# Patient Record
Sex: FEMALE | Race: WHITE | NOT HISPANIC OR LATINO | Employment: FULL TIME | URBAN - METROPOLITAN AREA
[De-identification: names, ages, dates, MRNs, and addresses within clinical notes are randomized per-mention and may not be internally consistent; named-entity substitution may affect disease eponyms.]

---

## 2017-01-30 ENCOUNTER — OFFICE VISIT (OUTPATIENT)
Dept: ENDOCRINOLOGY | Facility: CLINIC | Age: 27
End: 2017-01-30
Payer: COMMERCIAL

## 2017-01-30 VITALS
HEIGHT: 67 IN | DIASTOLIC BLOOD PRESSURE: 70 MMHG | WEIGHT: 134.13 LBS | SYSTOLIC BLOOD PRESSURE: 110 MMHG | BODY MASS INDEX: 21.05 KG/M2

## 2017-01-30 DIAGNOSIS — E55.9 VITAMIN D DEFICIENCY: ICD-10-CM

## 2017-01-30 DIAGNOSIS — L65.9 HAIR LOSS: Primary | ICD-10-CM

## 2017-01-30 PROCEDURE — 1159F MED LIST DOCD IN RCRD: CPT | Mod: S$GLB,,, | Performed by: INTERNAL MEDICINE

## 2017-01-30 PROCEDURE — 99999 PR PBB SHADOW E&M-EST. PATIENT-LVL III: CPT | Mod: PBBFAC,,, | Performed by: INTERNAL MEDICINE

## 2017-01-30 PROCEDURE — 99203 OFFICE O/P NEW LOW 30 MIN: CPT | Mod: S$GLB,,, | Performed by: INTERNAL MEDICINE

## 2017-01-30 RX ORDER — DEXAMETHASONE 1 MG/1
TABLET ORAL
Qty: 1 TABLET | Refills: 0 | Status: SHIPPED | OUTPATIENT
Start: 2017-01-30 | End: 2017-07-06

## 2017-01-30 RX ORDER — ERGOCALCIFEROL 1.25 MG/1
50000 CAPSULE ORAL WEEKLY
Qty: 12 CAPSULE | Refills: 1 | Status: SHIPPED | OUTPATIENT
Start: 2017-01-30 | End: 2017-04-18

## 2017-01-30 RX ORDER — CHOLECALCIFEROL (VITAMIN D3) 25 MCG
185 TABLET ORAL DAILY
COMMUNITY
End: 2017-07-06

## 2017-01-30 NOTE — MR AVS SNAPSHOT
[facility] Endocrinology  [address]  [facility] [address]  Phone: [phone]  Fax: [phone]                  Eve Kuhn   2017 8:30 AM   Office Visit    Description:  Female : 1990   Provider:  Will Anthony MD   Department:  [facility] - Endocrinology           Reason for Visit     Hair Loss           Diagnoses this Visit        Comments    Androgenic alopecia    -  Primary            To Do List           Future Appointments        Provider Department Dept Phone    2017 8:30 AM LAB, [place] [facility] [phone]    2017 3:30 PM Will Anthony MD [facility] [phone]      Goals (5 Years of Data)     None       These Medications        Disp Refills Start End    dexamethasone (DECADRON) 1 MG Tab 1 tablet 0 2017     Take at 11 pm the night before labs are to be drawn    Pharmacy: [facility] [address] [place] [address] [facility] [address] Ph #: [phone]         [facility]     [facility] Care [facility] -  Assistance  Registered nurses in the [facility] provide clinical advisement, health education, appointment booking, and other advisory services.  Call for this free service at [phone].             Medications           Message regarding Medications     Verify the changes and/or additions to your medication regime listed below are the same as discussed with your clinician today.  If any of these changes or additions are incorrect, please notify your healthcare provider.        START taking these NEW medications        Refills    dexamethasone (DECADRON) 1 MG Tab 0    Sig: Take at 11 pm the night before labs are to be drawn    Class: Normal      STOP taking these medications     methylPREDNISolone (MEDROL DOSEPACK) 4 mg tablet Take as directed    fluticasone (FLONASE) 50 mcg/actuation nasal spray 2 sprays by Each Nare route once daily.  "   benzonatate (TESSALON) 100 MG capsule 1 - 2 po every 6 hours prn cough    topiramate (TOPAMAX) 15 MG capsule Take 1 cap q d for 7d, then 2 caps q d for 7d, then switch to 50 mg tab           Verify that the below list of medications is an accurate representation of the medications you are currently taking.  If none reported, the list may be blank. If incorrect, please contact your healthcare provider. Carry this list with you in case of emergency.           Current Medications     ascorbic acid (VITAMIN C) 100 MG tablet Take 100 mg by mouth once daily.    sumatriptan (IMITREX) 100 MG tablet Take 1 at onset of migraine.  May repeat 1 tab in 2 hours.  Limit to 3 tabs/week.    topiramate (TOPAMAX) 50 MG tablet Take 1 tablet (50 mg total) by mouth once daily.    vitamin D 1000 units Tab Take 185 mg by mouth once daily.    dexamethasone (DECADRON) 1 MG Tab Take at 11 pm the night before labs are to be drawn           Clinical Reference Information           Vital Signs - Last Recorded  Most recent update: 1/30/2017  8:36 AM by Denice Bullock LPN    BP Ht Wt LMP BMI    110/70 (BP Method: Manual) 5' 7" (1.702 m) 60.9 kg (134 lb 2.4 oz) 01/09/2017 21.01 kg/m2      Blood Pressure          Most Recent Value    BP  110/70      Allergies as of 1/30/2017     Vancomycin Analogues    Ciprofloxacin    Penicillins      Immunizations Administered on Date of Encounter - 1/30/2017     None      Orders Placed During Today's Visit     Future Labs/Procedures Expected by Expires    ACTH  1/30/2017 3/31/2018    Cortisol, 8AM  1/30/2017 3/31/2018    TSH  1/30/2017 3/31/2018      "

## 2017-01-30 NOTE — PROGRESS NOTES
Subjective:      Patient ID: Eve Kuhn is a 26 y.o. female.    Chief Complaint:  Hair Loss      History of Present Illness    Ms.Nicole Lanny Kuhn is seen as a new patient for hair loss     She was told she needs endocrine eval longtime ago     She had hair loss since middle school   She was told she had nutritional deficiency     Follows with a dermatologist  She had menarche at age  13 or 14   On/off OCPs, she had h./o ovarian cyst rupture     Not on OCPs since 2014   Regular menses     No facial hair growth     + diarrhea but she does have IBS     + some unintentional weight loss  also on topamax  No stretch marks   Ankle fracture and wrist fracture   No long bone fractures       Review of Systems   Constitutional: Positive for unexpected weight change.   Eyes: Negative for visual disturbance.   Respiratory: Negative for cough and shortness of breath.    Cardiovascular: Negative for palpitations.   Gastrointestinal: Positive for diarrhea.   Endocrine: Positive for cold intolerance.   Genitourinary: Negative for menstrual problem.   Neurological: Negative for tremors.   Hematological: Bruises/bleeds easily.   Psychiatric/Behavioral: Positive for sleep disturbance (wakes up a lot ). The patient is not nervous/anxious.        Objective:   Physical Exam   Constitutional: She appears well-developed. No distress.   HENT:   Right Ear: External ear normal.   Left Ear: External ear normal.   Nose: Nose normal.   Neck: No thyromegaly present.   Cardiovascular: Normal rate.    No murmur heard.  Pulmonary/Chest: Effort normal and breath sounds normal. No respiratory distress.   Abdominal: Soft. There is no tenderness. No hernia.   Musculoskeletal: She exhibits no edema.   Neurological: She displays normal reflexes. No cranial nerve deficit.   Skin: No rash noted. She is not diaphoretic.          Psychiatric: She has a normal mood and affect. Judgment normal.   Vitals reviewed.      Lab Review:   Results  for VICTOR MANUEL LOMBARDO (MRN [id number]) as of 1/30/2017 08:55   Ref. Range 6/27/2016 11:51   Vit D, 25-Hydroxy Latest Ref Range: 30 - 96 ng/mL 12 (L)       Assessment:     1. Androgenic alopecia  Cortisol, 8AM    ACTH    TSH   2. Vitamin D deficiency          #Hair loss   - lets rule out hyperthyroidism and cushing's, very low suspicion for cushing's   - TSH today     1-5% topamax can cause alopecia     3 vit D deficiency   - start ergo 50 K weekly for 12 weeks       Plan:     Follow up:  Blood work tomorrow at 8 AM   F/u in 6 months- blood work will decide later

## 2017-01-31 ENCOUNTER — LAB VISIT (OUTPATIENT)
Dept: LAB | Facility: HOSPITAL | Age: 27
End: 2017-01-31
Attending: INTERNAL MEDICINE
Payer: COMMERCIAL

## 2017-01-31 DIAGNOSIS — L65.9 HAIR LOSS: ICD-10-CM

## 2017-01-31 LAB
CORTIS SERPL-MCNC: <1 UG/DL
TSH SERPL DL<=0.005 MIU/L-ACNC: 0.5 UIU/ML

## 2017-01-31 PROCEDURE — 84443 ASSAY THYROID STIM HORMONE: CPT

## 2017-01-31 PROCEDURE — 82024 ASSAY OF ACTH: CPT

## 2017-01-31 PROCEDURE — 82533 TOTAL CORTISOL: CPT

## 2017-01-31 PROCEDURE — 36415 COLL VENOUS BLD VENIPUNCTURE: CPT | Mod: PO

## 2017-02-01 ENCOUNTER — PATIENT MESSAGE (OUTPATIENT)
Dept: ENDOCRINOLOGY | Facility: CLINIC | Age: 27
End: 2017-02-01

## 2017-02-03 LAB — ACTH PLAS-MCNC: 14 PG/ML

## 2017-06-29 ENCOUNTER — PATIENT MESSAGE (OUTPATIENT)
Dept: NEUROLOGY | Facility: CLINIC | Age: 27
End: 2017-06-29

## 2017-06-29 RX ORDER — SUMATRIPTAN SUCCINATE 100 MG/1
TABLET ORAL
Qty: 9 TABLET | Refills: 6 | Status: SHIPPED | OUTPATIENT
Start: 2017-06-29 | End: 2017-11-13

## 2017-07-06 ENCOUNTER — OFFICE VISIT (OUTPATIENT)
Dept: FAMILY MEDICINE | Facility: CLINIC | Age: 27
End: 2017-07-06
Payer: COMMERCIAL

## 2017-07-06 VITALS
OXYGEN SATURATION: 98 % | BODY MASS INDEX: 21.31 KG/M2 | HEIGHT: 67 IN | SYSTOLIC BLOOD PRESSURE: 100 MMHG | HEART RATE: 92 BPM | WEIGHT: 135.81 LBS | TEMPERATURE: 99 F | RESPIRATION RATE: 16 BRPM | DIASTOLIC BLOOD PRESSURE: 80 MMHG

## 2017-07-06 DIAGNOSIS — R05.8 POST-VIRAL COUGH SYNDROME: Primary | ICD-10-CM

## 2017-07-06 DIAGNOSIS — R05.9 COUGH: ICD-10-CM

## 2017-07-06 DIAGNOSIS — R06.2 WHEEZE: ICD-10-CM

## 2017-07-06 DIAGNOSIS — Z91.09 ENVIRONMENTAL ALLERGIES: ICD-10-CM

## 2017-07-06 PROCEDURE — 99999 PR PBB SHADOW E&M-EST. PATIENT-LVL IV: CPT | Mod: PBBFAC,,, | Performed by: NURSE PRACTITIONER

## 2017-07-06 PROCEDURE — 99214 OFFICE O/P EST MOD 30 MIN: CPT | Mod: S$GLB,,, | Performed by: NURSE PRACTITIONER

## 2017-07-06 RX ORDER — MONTELUKAST SODIUM 10 MG/1
TABLET ORAL
Qty: 30 TABLET | Refills: 5 | Status: SHIPPED | OUTPATIENT
Start: 2017-07-06 | End: 2017-11-13

## 2017-07-06 RX ORDER — FLUTICASONE PROPIONATE 220 UG/1
1 AEROSOL, METERED RESPIRATORY (INHALATION) 2 TIMES DAILY
Qty: 12 G | Refills: 0 | Status: SHIPPED | OUTPATIENT
Start: 2017-07-06 | End: 2017-11-13

## 2017-07-06 RX ORDER — MONTELUKAST SODIUM 10 MG/1
TABLET ORAL
Refills: 1 | COMMUNITY
Start: 2017-04-24 | End: 2017-07-06 | Stop reason: SDUPTHER

## 2017-07-06 NOTE — PATIENT INSTRUCTIONS
Inhaler Use  The inhaler that you were prescribed contains a potent medicine. It should only be used as directed. The medicine in your inhaler must be breathed deeply into your lungs for it to work. It will not work at all if it only reaches your mouth and throat. Follow the instructions below for best results. And remember to follow your asthma action plan as given to you by your doctor.  1. Keep your inhaler at room temperature.  2. Hold the inhaler so that the part that goes into your mouth is at the bottom.  3. Shake the inhaler well and remove the cap.  4. Breathe out through your mouth to fully empty your lungs.  5. Place the inhaler in your mouth and close your lips tightly around it. (Or hold the inhaler 1 to 2 inches from your open mouth if told to do so by your healthcare provider.)  6. Squeeze the inhaler as you breathe in slowly through your mouth until your lungs are full of air, drawing the medicine deep into your lungs.  7. Hold your breath for 10 seconds, or as long as you can comfortably hold your breath. Then breathe out slowly.  8. If you have been advised to take 2 puffs, wait 5 minutes, then repeat steps 3-7 above. Waiting 5 minutes between puffs will alow the medicine to open up your lungs so the second puff can get deeper into the lungs. Replace the cap when done.  9. If you were prescribed both a steroid inhaler and a bronchodilator inhaler, use the bronchodilator first to open the air passages. Wait 5 minutes, then use the steroid inhaler.  10. Rinse your mouth with water and spit it out (especially after using a steroid inhaler). This is very important if you are using a steroid inhaler to prevent thrush, a mild yeast infection of the mouth and back of the throat.  11. A special chamber (spacer) may be prescribed that attaches to your inhaler. This increases the amount of medicine that goes to your lungs. It also improves how well each treatment works. Ask your doctor about this if you  did not receive one.    Keep it clean  Remove the metal canister and do not immerse it in water. Then clean the plastic mouthpiece, cap, and spacer if you have one, by rinsing them well in warm running water for 30 to 60 seconds. Shake off excess water and allow the mouthpiece to dry completely (overnight is recommended). This should be done once a week. If you need the inhaler before the mouthpiece is dry, shake off excess water, replace canister, and test spray 2 times (away from the face).  Warning  A steroid inhaler is used to prevent an asthma attack. Do not use this to treat an acute wheezing episode. Use only bronchodilator inhalers (quick relief) to treat an acute asthma attack.  If you find that your medicine is not working and you need to use it more often than prescribed, this could be a sign that your asthma is getting worse. Go to the emergency room or urgent care right away. An asthma attack is easiest to treat in the early stages before it becomes severe.  When to seek medical advice  Get prompt medical attention if any of the following occur:  · Increased wheezing or shortness of breath  · Need to use your inhalers more often than usual without relief  · Fever of 100.4°F (38°C) or higher, or as directed by your healthcare provider  · Coughing up lots of dark-colored or bloody sputum (mucus)  · Chest pain with each breath  · Blue lips or fingernails  · Peak flow reading less than 50% of your normal best  Date Last Reviewed: 12/2/2015  © 5244-5235 [employer]. [address] [place] [address], [place] [address]. All rights reserved. This information is not intended as a substitute for professional medical care. Always follow your healthcare professional's instructions.        Using an Inhaler  Your healthcare provider may prescribe medicine that you breathe in using a metered-dose inhaler (MDI). An inhaler sends a measured amount of medicine in a fine mist.  Step 1:  · Shake the inhaler and remove  "the cap.  · Take a deep breath and let it out.  Step 2:  · Close your lips around the end of the inhaler mouthpiece. Or if you were told to use the "open-mouth" method, hold the inhaler 1 to 2 inches from your mouth.  Step 3:  · Breathe in slowly and deeply as you press down on the inhaler to release the medicine.  · Inhale fully.  Step 4:  · Hold your breath for a count of 10, or as long as you can comfortably.  · Then breathe out slowly through your mouth.  · Repeat these steps for each puff of medicine prescribed.             Important  · If the inhaler is being used for the first time or has not been used for a while, prime it as directed by the product maker. You can find important information about the medicine in the package insert. This is the paper that comes with the medicine.  · If you use more than one inhaler, make sure you know which one to use first.  · Your healthcare provider or pharmacist can show you how to use your inhaler the right way. Even if you think you are using it the right way, it is still a good idea to check.   Date Last Reviewed: 10/1/2016  © 2817-8179 [employer]. [address] [place] [address], [place] [address]. All rights reserved. This information is not intended as a substitute for professional medical care. Always follow your healthcare professional's instructions.        "

## 2017-07-06 NOTE — PROGRESS NOTES
Subjective:       Patient ID: Eve Kuhn is a 27 y.o. female.    Chief Complaint: Bronchitis (bronchitis 2 weeks ago and was treated for it but still has symptoms. Coughing,S.O.B,Low grade fever)    Here today to follow up on bronchitis - treated 3 weeks ago at urgent care  -   Went and told URI - given steroid shot and steroid pack and ABX and inhaler and tessalon    Dry and tight cough now - 99.3 temp - put her on omnicef - completed treatment - completed it 2 days ago   Doing albuterol treatments that she had at home for her son. - doing albuterol as needed - not in few days       Shortness of Breath   This is a new problem. The current episode started 1 to 4 weeks ago. The problem occurs every few minutes. The problem has been unchanged. The average episode lasts 30 seconds. Associated symptoms include chest pain, a fever, PND, sputum production and wheezing. Pertinent negatives include no abdominal pain, claudication, coryza, ear pain, headaches, hemoptysis, leg pain, leg swelling, neck pain, orthopnea, rash, rhinorrhea, sore throat, swollen glands, syncope or vomiting. The symptoms are aggravated by occupational exposure, exercise and any activity. The patient has no known risk factors for DVT/PE. She has tried beta agonist inhalers, leukotriene antagonists and oral steroids for the symptoms. The treatment provided mild relief. Her past medical history is significant for allergies and bronchiolitis. There is no history of aspirin allergies, asthma, CAD, chronic lung disease, COPD, DVT, a heart failure, PE, pneumonia or a recent surgery.     Vitals:    07/06/17 1251   BP: 100/80   Pulse: 92   Resp: 16   Temp: 99.3 °F (37.4 °C)     Review of Systems   Constitutional: Positive for fever. Negative for diaphoresis and fatigue.   HENT: Negative.  Negative for ear pain, rhinorrhea and sore throat.    Eyes: Negative.    Respiratory: Positive for sputum production, shortness of breath and wheezing.  Negative for cough and hemoptysis.    Cardiovascular: Positive for chest pain and PND. Negative for orthopnea, claudication, leg swelling and syncope.   Gastrointestinal: Negative.  Negative for abdominal pain, diarrhea, nausea and vomiting.   Endocrine: Negative.    Genitourinary: Negative.  Negative for dysuria and hematuria.   Musculoskeletal: Negative.  Negative for neck pain.   Skin: Negative.  Negative for color change and rash.   Allergic/Immunologic: Negative.    Neurological: Negative.  Negative for speech difficulty, numbness and headaches.   Hematological: Negative.    Psychiatric/Behavioral: Negative.        Past Medical History:   Diagnosis Date    Abnormal Pap smear     Androgenic alopecia     Chronic rhinitis     Hair loss 1/30/2017    Headache     IBS (irritable bowel syndrome)      Objective:      Physical Exam   Constitutional: She is oriented to person, place, and time. She appears well-developed and well-nourished.   HENT:   Head: Normocephalic and atraumatic.   Right Ear: External ear normal.   Left Ear: External ear normal.   Nose: Nose normal.   Mouth/Throat: Oropharynx is clear and moist. No oropharyngeal exudate.   Eyes: Conjunctivae and EOM are normal. Pupils are equal, round, and reactive to light.   Neck: Normal range of motion. Neck supple.   Cardiovascular: Normal rate, regular rhythm, normal heart sounds and intact distal pulses.  Exam reveals no friction rub.    No murmur heard.  Pulmonary/Chest: Effort normal. No apnea. She has wheezes in the left upper field, the left middle field and the left lower field.   Abdominal: Soft. Bowel sounds are normal.   Musculoskeletal: Normal range of motion.   Neurological: She is alert and oriented to person, place, and time.   Skin: Skin is warm and dry.   Psychiatric: She has a normal mood and affect. Her behavior is normal.   Nursing note and vitals reviewed.      Assessment:       1. Post-viral cough syndrome    2. Cough    3. Wheeze     4. Environmental allergies        Plan:       Post-viral cough syndrome  -     fluticasone (FLOVENT HFA) 220 mcg/actuation inhaler; Inhale 1 puff into the lungs 2 (two) times daily. Controller  Dispense: 12 g; Refill: 0    Cough  -     fluticasone (FLOVENT HFA) 220 mcg/actuation inhaler; Inhale 1 puff into the lungs 2 (two) times daily. Controller  Dispense: 12 g; Refill: 0    Wheeze  -     fluticasone (FLOVENT HFA) 220 mcg/actuation inhaler; Inhale 1 puff into the lungs 2 (two) times daily. Controller  Dispense: 12 g; Refill: 0    Environmental allergies  -     montelukast (SINGULAIR) 10 mg tablet; TK 1 T PO QD  Dispense: 30 tablet; Refill: 5        instructions on inhalers  Albuterol nebs 3 x day at home   Use flovent for 2 weeks  Cough will gradually decrease  Call if not better

## 2017-09-07 ENCOUNTER — OFFICE VISIT (OUTPATIENT)
Dept: FAMILY MEDICINE | Facility: CLINIC | Age: 27
End: 2017-09-07
Payer: COMMERCIAL

## 2017-09-07 VITALS
RESPIRATION RATE: 16 BRPM | SYSTOLIC BLOOD PRESSURE: 90 MMHG | DIASTOLIC BLOOD PRESSURE: 60 MMHG | WEIGHT: 135.13 LBS | HEART RATE: 99 BPM | BODY MASS INDEX: 21.21 KG/M2 | TEMPERATURE: 99 F | HEIGHT: 67 IN

## 2017-09-07 DIAGNOSIS — M94.0 COSTOCHONDRITIS: ICD-10-CM

## 2017-09-07 DIAGNOSIS — Z86.19 FREQUENT INFECTIONS: ICD-10-CM

## 2017-09-07 DIAGNOSIS — B08.5 HERPANGINA: Primary | ICD-10-CM

## 2017-09-07 PROCEDURE — 99999 PR PBB SHADOW E&M-EST. PATIENT-LVL III: CPT | Mod: PBBFAC,,, | Performed by: NURSE PRACTITIONER

## 2017-09-07 PROCEDURE — 99214 OFFICE O/P EST MOD 30 MIN: CPT | Mod: S$GLB,,, | Performed by: NURSE PRACTITIONER

## 2017-09-07 PROCEDURE — 3008F BODY MASS INDEX DOCD: CPT | Mod: S$GLB,,, | Performed by: NURSE PRACTITIONER

## 2017-09-07 NOTE — PATIENT INSTRUCTIONS
Hand, Foot & Mouth Disease (Child)    Hand, foot, and mouth disease (HFMD) is an illness caused by a virus. It is usually seen in infant and children younger than 10 years of age, but can occur in adults. This virus causes small ulcers in the mouth (throat, lips, cheeks, gums, and tongue) and small blisters or red spots may appear on the palms (hands), diaper area, and soles of the feet. There is usually a low-grade fever and poor appetite. HFMD is not a serious illness and usually go away in 1 to 2 weeks. The painful sores in the mouth may prevent your child from taking oral fluids well and result in dehydration.  It takes 3 to 5 days for the illness to appear in an exposed child. Generally, the HFMD is the most contagious during the first week of the illness. Sometimes, people can be contagious for days or weeks after the symptoms have disappeared. Adults who get infected with the HFMD may not have symptoms and may still be contagious.  HFMD can be transmitted from person to person by:  · Touching your nose, mouth, eye after touching the stool of an infected person (has the virus)  · Touching your nose, mouth, eye after touching fluid from the blisters/sores of an infected person  · Respiratory secretions (sneezing, coughing, blowing your nose)  · Touching contaminated objects (toys, doorknobs)  · Oral secretions (kissing)  Home care  Mouth pain  Unless your doctor has prescribed another medicine for mouth pain:  · Acetaminophen or ibuprofen may be used for pain or discomfort. Please consult your child's doctor before giving your child acetaminophen or ibuprofen for dosing instructions and when to give the medicine (schedule).  Do not give ibuprofen to an infant 6 months of age or younger. Talk to your child's doctor before giving him or her over-the counter medicines.  · Liquid antacid can be used 4 times per day to coat the mouth sores for pain relief.  Follow these instructions or do as directed by your  child's doctor.  ¨ Children over age 4 can use 1 teaspoon (5 ml)  as a mouth rinse after meals.  ¨ For children under age 4, a parent can place 1/2 teaspoon (2.5 ml)  in the front of the mouth after meals.  Avoid regular mouth rinses because they may sting.  Feeding  Follow a soft diet with plenty of fluids to prevent dehydration. If your child doesn't want to eat solid foods, it's OK for a few days, as long as he or she drinks lots of fluid. Cool drinks and frozen treats (sherbet) are soothing and easier to take. Avoid citrus juices (orange juice, lemonade, etc.) and salty or spicy foods. These may cause more pain in the mouth sores.  Fever  You may use acetaminophen or ibuprofen for fever, as directed by your child's doctor. Talk to your child's doctor for dosing instructions and schedule. Do not give ibuprofen to an infant 6 months of age or younger. If your child has chronic liver or kidney disease or ever had a stomach ulcer or GI bleeding, talk with your doctor before using these medicines.  Aspirin should never be used in anyone under 18 years of age who is ill with a fever. It may cause severe disease (Reye Syndrome) or death.  Isolation  Children may return to day care or school once the fever is gone and they are eating and drinking well. Contact your healthcare provider and ask when your child (or you) is able to return to school (or work).  Follow up  Follow up with your doctor as directed by our staff.  When to seek medical care  Call your child's healthcare provider right away if any of these occur:  · Your child complains of neck or chest pain  · Your child is having trouble breathing and lethargic  · Your child is having trouble swallowing  · Mouth ulcers are present after 2 weeks  · Your child's condition is worse  · Your child appear to be dehydrated (dry mouth, no tears, haven' t urinated is 8 or more hours)  · Fever of 100.4°F (38°C) or higher, not better with fever medicine  · Your child has  repeated fevers above 104°F (40°C)  · Your child is younger than 2 years old and their fever continues for more than 24 hours  · Your child is 2 years old and older and their fever continues for more than 3 days  When to call 911  When to call 911 or seek medical care immediately :  · Unusual fussiness, drowsiness or confusion  · Dark purple rash  · Trouble breathing  · Seizure  Date Last Reviewed: 8/13/2015  © 7942-7063 [employer]. [address] [place] [address]. All rights reserved. This information is not intended as a substitute for professional medical care. Always follow your healthcare professional's instructions.

## 2017-09-07 NOTE — PROGRESS NOTES
Subjective:       Patient ID: Eve Khun is a 27 y.o. female.    Chief Complaint: Mouth Lesions (Sore in mouth noticed it this AM and had fever on Monday night.Son has them too and was Dx with herpangina)    Noticed sore in mouth this AM  Has been home with sick son for 3 weeks  No chills, body aches  Temp max 102  4 days ago , Went to  - neck glands swollen, red throat, 102 - gave ibuprofen & did strep (-)     Prescribed amox, started on tues, temp 99 during day, spikes to 101 at night    Son has Hand foot and mouth   Had bronchitis several months ago  Feels worse when lying down, chest tightness    Dx with costochondritis - states SOB & chest tightness is most likely r/t this  Had full cardiac workup - states everything came back fine    Would like to have labs done once better for immune function      Sore Throat    This is a new problem. The current episode started in the past 7 days. The problem has been gradually worsening. Neither side of throat is experiencing more pain than the other. The maximum temperature recorded prior to her arrival was 102 - 102.9 F. The fever has been present for 3 to 4 days. The pain is at a severity of 7/10. The pain is moderate. Associated symptoms include abdominal pain, congestion, headaches, a hoarse voice, shortness of breath and swollen glands. Pertinent negatives include no coughing, diarrhea, drooling, ear discharge, ear pain, plugged ear sensation, neck pain, stridor, trouble swallowing or vomiting. She has had no exposure to strep or mono. She has tried NSAIDs, acetaminophen and cool liquids for the symptoms. The treatment provided no relief.     Vitals:    09/07/17 1113   BP: 90/60   Pulse: 99   Resp: 16   Temp: 98.9 °F (37.2 °C)     Review of Systems   Constitutional: Positive for chills, fatigue and fever. Negative for diaphoresis.   HENT: Positive for congestion, hoarse voice, postnasal drip and sore throat. Negative for drooling, ear discharge, ear  pain and trouble swallowing.    Eyes: Negative.    Respiratory: Positive for chest tightness, shortness of breath and wheezing. Negative for cough and stridor.    Cardiovascular: Negative for chest pain and palpitations.        Was tachycardic on Tues - 128 HR   Gastrointestinal: Positive for abdominal pain. Negative for diarrhea, nausea and vomiting.   Endocrine: Negative.    Genitourinary: Negative.  Negative for dysuria and hematuria.   Musculoskeletal: Positive for arthralgias. Negative for neck pain.        C/o shooting pains in rafita hands/fingers   Skin: Negative for color change, pallor and rash.   Allergic/Immunologic: Negative.    Neurological: Positive for dizziness, weakness and headaches. Negative for speech difficulty, light-headedness and numbness.        Dizzy more so when eyes closed   Hematological: Negative.    Psychiatric/Behavioral: Negative.        Past Medical History:   Diagnosis Date    Abnormal Pap smear     Androgenic alopecia     Chronic rhinitis     Hair loss 1/30/2017    Headache(784.0)     IBS (irritable bowel syndrome)      Objective:      Physical Exam   Constitutional: She is oriented to person, place, and time. She appears well-developed and well-nourished.   HENT:   Head: Normocephalic and atraumatic.   Right Ear: External ear normal.   Left Ear: External ear normal.   Nose: Nose normal.   Mouth/Throat: Uvula is midline. Posterior oropharyngeal erythema present.   1 small ulcerated lesion noted to L inner lip mucosa, 1 small ulcerated lesion noted to L buccal mucosa   Eyes: Conjunctivae and EOM are normal. Pupils are equal, round, and reactive to light. Left eye exhibits no discharge.   Wearing glasses   Neck: Neck supple. No JVD present. No tracheal deviation present.   Cardiovascular: Normal rate, regular rhythm, normal heart sounds and intact distal pulses.  Exam reveals no gallop and no friction rub.    No murmur heard.  Pulses:       Radial pulses are 2+ on the right  side, and 2+ on the left side.        Dorsalis pedis pulses are 2+ on the right side, and 2+ on the left side.   Pulmonary/Chest: Effort normal and breath sounds normal. No stridor. No respiratory distress. She has no wheezes. She has no rhonchi. She has no rales. She exhibits no tenderness.   Abdominal: Soft. Bowel sounds are normal. She exhibits no distension. There is no tenderness. There is no rebound and no guarding.   Musculoskeletal: Normal range of motion. She exhibits no edema.   Neurological: She is alert and oriented to person, place, and time.   Skin: Skin is warm and dry. Capillary refill takes less than 2 seconds. Rash noted. No erythema. No pallor.   Psychiatric: She has a normal mood and affect. Her behavior is normal. Judgment and thought content normal.   Nursing note and vitals reviewed.      Assessment:       1. Herpangina    2. Frequent infections    3. Costochondritis        Plan:       Herpangina  -     diphenhydrAMINE-aluminum-magnesium hydroxide-simethicone-lidocaine HCl 2%; Swish and spit 15 mLs every 4 (four) hours as needed.  Dispense: 180 mL; Refill: 0   Printed instructions given on viral course, hygiene, cleaning measures, hydration    Frequent infections  -     Ambulatory referral to Immunology    Costochondritis   Had full cardiac workup upon diagnosis   Stable, continue to monitor, instructed on worrisome cardiac symptoms   FU for change in symptoms        FU if symptoms worsen

## 2017-09-20 ENCOUNTER — OFFICE VISIT (OUTPATIENT)
Dept: ALLERGY | Facility: CLINIC | Age: 27
End: 2017-09-20
Payer: COMMERCIAL

## 2017-09-20 ENCOUNTER — LAB VISIT (OUTPATIENT)
Dept: LAB | Facility: HOSPITAL | Age: 27
End: 2017-09-20
Attending: ALLERGY & IMMUNOLOGY
Payer: COMMERCIAL

## 2017-09-20 VITALS — BODY MASS INDEX: 21.14 KG/M2 | HEART RATE: 94 BPM | WEIGHT: 134.69 LBS | HEIGHT: 67 IN | OXYGEN SATURATION: 95 %

## 2017-09-20 DIAGNOSIS — J31.0 OTHER CHRONIC RHINITIS: ICD-10-CM

## 2017-09-20 DIAGNOSIS — J32.9 RECURRENT SINUS INFECTIONS: ICD-10-CM

## 2017-09-20 DIAGNOSIS — H10.423 SIMPLE CHRONIC CONJUNCTIVITIS OF BOTH EYES: ICD-10-CM

## 2017-09-20 DIAGNOSIS — J32.9 RECURRENT SINUS INFECTIONS: Primary | ICD-10-CM

## 2017-09-20 LAB
BASOPHILS # BLD AUTO: 0.02 K/UL
BASOPHILS NFR BLD: 0.3 %
DIFFERENTIAL METHOD: ABNORMAL
EOSINOPHIL # BLD AUTO: 0.1 K/UL
EOSINOPHIL NFR BLD: 1.1 %
ERYTHROCYTE [DISTWIDTH] IN BLOOD BY AUTOMATED COUNT: 13.2 %
HCT VFR BLD AUTO: 40.8 %
HGB BLD-MCNC: 13.4 G/DL
IGA SERPL-MCNC: 203 MG/DL
IGE SERPL-ACNC: <35 IU/ML
IGG SERPL-MCNC: 1246 MG/DL
IGM SERPL-MCNC: 159 MG/DL
LYMPHOCYTES # BLD AUTO: 2.9 K/UL
LYMPHOCYTES NFR BLD: 39.7 %
MCH RBC QN AUTO: 27.6 PG
MCHC RBC AUTO-ENTMCNC: 32.8 G/DL
MCV RBC AUTO: 84 FL
MONOCYTES # BLD AUTO: 0.5 K/UL
MONOCYTES NFR BLD: 6.6 %
NEUTROPHILS # BLD AUTO: 3.7 K/UL
NEUTROPHILS NFR BLD: 52.2 %
PLATELET # BLD AUTO: 373 K/UL
PMV BLD AUTO: 10.9 FL
RBC # BLD AUTO: 4.86 M/UL
WBC # BLD AUTO: 7.17 K/UL

## 2017-09-20 PROCEDURE — 99999 PR PBB SHADOW E&M-EST. PATIENT-LVL II: CPT | Mod: PBBFAC,,, | Performed by: ALLERGY & IMMUNOLOGY

## 2017-09-20 PROCEDURE — 36415 COLL VENOUS BLD VENIPUNCTURE: CPT | Mod: PO

## 2017-09-20 PROCEDURE — 82784 ASSAY IGA/IGD/IGG/IGM EACH: CPT

## 2017-09-20 PROCEDURE — 86003 ALLG SPEC IGE CRUDE XTRC EA: CPT

## 2017-09-20 PROCEDURE — 82784 ASSAY IGA/IGD/IGG/IGM EACH: CPT | Mod: 59

## 2017-09-20 PROCEDURE — 86355 B CELLS TOTAL COUNT: CPT

## 2017-09-20 PROCEDURE — 86684 HEMOPHILUS INFLUENZA ANTIBDY: CPT

## 2017-09-20 PROCEDURE — 86357 NK CELLS TOTAL COUNT: CPT

## 2017-09-20 PROCEDURE — 85025 COMPLETE CBC W/AUTO DIFF WBC: CPT

## 2017-09-20 PROCEDURE — 82785 ASSAY OF IGE: CPT

## 2017-09-20 PROCEDURE — 82787 IGG 1 2 3 OR 4 EACH: CPT | Mod: 59

## 2017-09-20 PROCEDURE — 86003 ALLG SPEC IGE CRUDE XTRC EA: CPT | Mod: 59

## 2017-09-20 PROCEDURE — 86359 T CELLS TOTAL COUNT: CPT

## 2017-09-20 PROCEDURE — 86360 T CELL ABSOLUTE COUNT/RATIO: CPT

## 2017-09-20 PROCEDURE — 99244 OFF/OP CNSLTJ NEW/EST MOD 40: CPT | Mod: S$GLB,,, | Performed by: ALLERGY & IMMUNOLOGY

## 2017-09-20 NOTE — PROGRESS NOTES
"Subjective:       Patient ID: Eve Kuhn is a 27 y.o. female.    Chief Complaint:  Other (frequent illnesses)      26 yo woman presents for consult from [facility] for recurrent infections. She states she has an almost 1 yo son with Downs syndrome, Timbo. He started  November 2016 and in January 2017 he had RSV and flu and bilat pneumonia and was in hospital. Since then he has recurrent infections and she continues to catch them. She had bad bronchitis, several sinus infections and last herpangina. He has had immune system checked and has little low IgM so she wonders about her immune system. She was not often sick prior to this.  She does also have chronic "allergies". She is always congested. She blows nose every day. No sneeze. When gets bad does have tight chest with it. No season worse. No time of day worse. No triggers. She tied Singulair and no help.   Finally she gets a rash about 2 days prior to menstrual cycle and resolves by end of cycle. Never had it at all in pregnancy. It is small flesh colored bumps on elbow and fingers around joints. Fingers will hurt bur elbows only hurt if bumps them. No itch.   She has no known food, insect or latex allergy. No asthma or eczema. No other medical issues.         Environmental History: see history section for home environment  Review of Systems   Constitutional: Negative for appetite change, chills, fatigue and fever.   HENT: Positive for congestion and rhinorrhea. Negative for ear discharge, ear pain, facial swelling, nosebleeds, postnasal drip, sinus pressure, sneezing, sore throat, trouble swallowing and voice change.    Eyes: Negative for discharge, redness, itching and visual disturbance.   Respiratory: Positive for chest tightness. Negative for cough, choking, shortness of breath and wheezing.    Cardiovascular: Negative for chest pain, palpitations and leg swelling.   Gastrointestinal: Negative for abdominal distention, abdominal " pain, constipation, diarrhea, nausea and vomiting.   Genitourinary: Negative for difficulty urinating.   Musculoskeletal: Negative for arthralgias, gait problem, joint swelling and myalgias.   Skin: Positive for rash. Negative for color change.   Neurological: Negative for dizziness, syncope, weakness, light-headedness and headaches.   Hematological: Negative for adenopathy. Does not bruise/bleed easily.   Psychiatric/Behavioral: Negative for agitation, behavioral problems, confusion and sleep disturbance. The patient is not nervous/anxious.         Objective:    Physical Exam   Constitutional: She is oriented to person, place, and time. She appears well-developed and well-nourished. No distress.   HENT:   Head: Normocephalic and atraumatic.   Right Ear: Hearing, tympanic membrane, external ear and ear canal normal.   Left Ear: Hearing, tympanic membrane, external ear and ear canal normal.   Nose: No mucosal edema, rhinorrhea, sinus tenderness or septal deviation. No epistaxis. Right sinus exhibits no maxillary sinus tenderness and no frontal sinus tenderness. Left sinus exhibits no maxillary sinus tenderness and no frontal sinus tenderness.   Mouth/Throat: Uvula is midline, oropharynx is clear and moist and mucous membranes are normal. No uvula swelling.   Eyes: Conjunctivae are normal. Right eye exhibits no discharge. Left eye exhibits no discharge.   Neck: Normal range of motion. No thyromegaly present.   Cardiovascular: Normal rate, regular rhythm and normal heart sounds.    No murmur heard.  Pulmonary/Chest: Effort normal and breath sounds normal. No respiratory distress. She has no wheezes.   Abdominal: Soft. She exhibits no distension. There is no tenderness.   Musculoskeletal: Normal range of motion. She exhibits no edema or tenderness.   Lymphadenopathy:     She has no cervical adenopathy.   Neurological: She is alert and oriented to person, place, and time.   Skin: Skin is warm and dry. No rash noted. No  erythema.   Psychiatric: She has a normal mood and affect. Her behavior is normal. Judgment and thought content normal.   Nursing note and vitals reviewed.      Laboratory:   none performed   Assessment:       1. Recurrent sinus infections    2. Other chronic rhinitis    3. Simple chronic conjunctivitis of both eyes         Plan:       1. Advised likely recurrent infections are more related to exposure from young child but will send labs to eval immune systema dn immunocaps  2. Discussed rash with pt and advised needs derm eval  3. Phone review  4. NP Sarah notified of completed consult via [employer]

## 2017-09-20 NOTE — LETTER
September 20, 2017      Mague Smith, NP  [address] [facility] [address] [place] [address]           [facility]  [address] [facility] [address] [place] [address]  Phone: [phone]          Patient: Eve Kuhn   MR Number: [id number]   YOB: 1990   Date of Visit: 9/20/2017       Dear Mague Smith:    Thank you for referring Eve Kuhn to me for evaluation. Attached you will find relevant portions of my assessment and plan of care.    If you have questions, please do not hesitate to call me. I look forward to following Eve Kuhn along with you.    Sincerely,    Shazia Rodríguez MD    Enclosure  CC:  No Recipients    If you would like to receive this communication electronically, please contact [email or web address] or [phone] to request more information on [employer] access.    For providers and/or their staff who would like to refer a patient to [facility], please contact us through our one-stop-shop provider referral line, [facility], at [phone].    If you feel you have received this communication in error or would no longer like to receive these types of communications, please e-mail [email or web address]

## 2017-09-21 LAB
CD3+CD4+ CELLS # BLD: 1287 CELLS/UL (ref 300–1400)
CD3+CD4+ CELLS NFR BLD: 41.9 % (ref 28–57)
LYMPHOCYTES NFR CSF MANUAL: 1.11 % (ref 0.9–3.6)
LYMPHOCYTES NFR CSF MANUAL: 11.2 % (ref 7–31)
LYMPHOCYTES NFR CSF MANUAL: 1161 CELLS/UL (ref 200–900)
LYMPHOCYTES NFR CSF MANUAL: 2476 CELLS/UL (ref 700–2100)
LYMPHOCYTES NFR CSF MANUAL: 294 CELLS/UL (ref 100–500)
LYMPHOCYTES NFR CSF MANUAL: 336 CELLS/UL (ref 90–600)
LYMPHOCYTES NFR CSF MANUAL: 37.8 % (ref 10–39)
LYMPHOCYTES NFR CSF MANUAL: 80.7 % (ref 55–83)
LYMPHOCYTES NFR CSF MANUAL: 9.8 % (ref 6–19)

## 2017-09-23 LAB
IGG1 SER-MCNC: 549 MG/DL
IGG2 SER-MCNC: 382 MG/DL
IGG3 SER-MCNC: 164 MG/DL
IGG4 SER-MCNC: 40 MG/DL

## 2017-09-26 LAB
A ALTERNATA IGE QN: <0.35 KU/L
A FUMIGATUS IGE QN: <0.35 KU/L
ALLERGEN MAPLE/SYCAMORE IGE: <0.35 KU/L
ALLERGEN PENICILLIUM IGE: <0.35 KU/L
ALLERGEN WALNUT TREE IGE: <0.35 KU/L
ALLERGEN WHITE PINE TREE IGE: <0.35 KU/L
ALLERGEN WILLOW IGE: <0.35 KU/L
BAHIA GRASS IGE QN: <0.35 KU/L
BALD CYPRESS IGE QN: <0.35 KU/L
BERMUDA GRASS IGE QN: <0.35 KU/L
C GLOBOSUM IGE QN: <0.35 KU/L
C HERBARUM IGE QN: <0.35 KU/L
C LUNATA IGE QN: <0.35 KU/L
CAT DANDER IGE QN: <0.35 KU/L
COMMON RAGWEED IGE QN: <0.35 KU/L
COTTONWOOD IGE QN: <0.35 KU/L
D FARINAE IGE QN: <0.35 KU/L
D PTERONYSS IGE QN: <0.35 KU/L
DEPRECATED A ALTERNATA IGE RAST QL: NORMAL
DEPRECATED A FUMIGATUS IGE RAST QL: NORMAL
DEPRECATED BAHIA GRASS IGE RAST QL: NORMAL
DEPRECATED BALD CYPRESS IGE RAST QL: NORMAL
DEPRECATED BERMUDA GRASS IGE RAST QL: NORMAL
DEPRECATED C GLOBOSUM IGE RAST QL: NORMAL
DEPRECATED C HERBARUM IGE RAST QL: NORMAL
DEPRECATED C LUNATA IGE RAST QL: NORMAL
DEPRECATED CAT DANDER IGE RAST QL: NORMAL
DEPRECATED COMMON RAGWEED IGE RAST QL: NORMAL
DEPRECATED COTTONWOOD IGE RAST QL: NORMAL
DEPRECATED D FARINAE IGE RAST QL: NORMAL
DEPRECATED D PTERONYSS IGE RAST QL: NORMAL
DEPRECATED DOG DANDER IGE RAST QL: NORMAL
DEPRECATED ENGL PLANTAIN IGE RAST QL: NORMAL
DEPRECATED JOHNSON GRASS IGE RAST QL: NORMAL
DEPRECATED MARSH ELDER IGE RAST QL: NORMAL
DEPRECATED MUGWORT IGE RAST QL: NORMAL
DEPRECATED PECAN/HICK TREE IGE RAST QL: NORMAL
DEPRECATED ROACH IGE RAST QL: NORMAL
DEPRECATED S ROSTRATA IGE RAST QL: NORMAL
DEPRECATED SALTWORT IGE RAST QL: NORMAL
DEPRECATED SILVER BIRCH IGE RAST QL: NORMAL
DEPRECATED TIMOTHY IGE RAST QL: NORMAL
DEPRECATED WHITE OAK IGE RAST QL: NORMAL
DOG DANDER IGE QN: <0.35 KU/L
ENGL PLANTAIN IGE QN: <0.35 KU/L
JOHNSON GRASS IGE QN: <0.35 KU/L
MAPLE/SYCAMORE CLASS: NORMAL
MARSH ELDER IGE QN: <0.35 KU/L
MUGWORT IGE QN: <0.35 KU/L
PECAN/HICK TREE IGE QN: <0.35 KU/L
PENICILLIUM CLASS: NORMAL
RAGWEED, WESTERN IGE: <0.35 KU/L
RAGWEED, WESTERN, CLASS: NORMAL
ROACH IGE QN: <0.35 KU/L
S ROSTRATA IGE QN: <0.35 KU/L
SALTWORT IGE QN: <0.35 KU/L
SILVER BIRCH IGE QN: <0.35 KU/L
TIMOTHY IGE QN: <0.35 KU/L
WALNUT TREE CLASS: NORMAL
WHITE OAK IGE QN: <0.35 KU/L
WHITE PINE CLASS: NORMAL
WILLOW CLASS: NORMAL

## 2017-09-27 LAB
C DIPHTHERIAE AB SER IA-ACNC: 0.91 IU/ML
C TETANI AB SER-ACNC: 3.54 IU/ML
DEPRECATED S PNEUM 1 IGG SER-MCNC: 1.1 MCG/ML
DEPRECATED S PNEUM12 IGG SER-MCNC: <0.3 MCG/ML
DEPRECATED S PNEUM14 IGG SER-MCNC: <0.3 MCG/ML
DEPRECATED S PNEUM19 IGG SER-MCNC: 9.2 MCG/ML
DEPRECATED S PNEUM23 IGG SER-MCNC: 0.6 MCG/ML
DEPRECATED S PNEUM3 IGG SER-MCNC: 2.1 MCG/ML
DEPRECATED S PNEUM4 IGG SER-MCNC: 1.2 MCG/ML
DEPRECATED S PNEUM5 IGG SER-MCNC: 0.8 MCG/ML
DEPRECATED S PNEUM8 IGG SER-MCNC: <0.3 MCG/ML
DEPRECATED S PNEUM9 IGG SER-MCNC: <0.3 MCG/ML
HAEM INFLU B IGG SER-MCNC: 0.84 MG/L
S PNEUM DA 18C IGG SER-MCNC: 1.3 MCG/ML
S PNEUM DA 6B IGG SER-MCNC: 0.7 MCG/ML
S PNEUM DA 7F IGG SER-MCNC: 0.8 MCG/ML
S PNEUM DA 9V IGG SER-MCNC: 1 MCG/ML

## 2017-09-28 ENCOUNTER — TELEPHONE (OUTPATIENT)
Dept: ALLERGY | Facility: CLINIC | Age: 27
End: 2017-09-28

## 2017-09-29 ENCOUNTER — PATIENT MESSAGE (OUTPATIENT)
Dept: INTERNAL MEDICINE | Facility: CLINIC | Age: 27
End: 2017-09-29

## 2017-11-13 ENCOUNTER — OFFICE VISIT (OUTPATIENT)
Dept: FAMILY MEDICINE | Facility: CLINIC | Age: 27
End: 2017-11-13
Payer: COMMERCIAL

## 2017-11-13 VITALS
RESPIRATION RATE: 16 BRPM | WEIGHT: 132.06 LBS | HEART RATE: 87 BPM | DIASTOLIC BLOOD PRESSURE: 78 MMHG | BODY MASS INDEX: 20.73 KG/M2 | HEIGHT: 67 IN | SYSTOLIC BLOOD PRESSURE: 110 MMHG | OXYGEN SATURATION: 99 % | TEMPERATURE: 99 F

## 2017-11-13 DIAGNOSIS — G43.909 MIGRAINE WITHOUT STATUS MIGRAINOSUS, NOT INTRACTABLE, UNSPECIFIED MIGRAINE TYPE: ICD-10-CM

## 2017-11-13 DIAGNOSIS — F41.9 ANXIETY: Primary | ICD-10-CM

## 2017-11-13 DIAGNOSIS — R00.0 TACHYCARDIA: ICD-10-CM

## 2017-11-13 DIAGNOSIS — E55.9 VITAMIN D DEFICIENCY: ICD-10-CM

## 2017-11-13 DIAGNOSIS — R42 DIZZINESS: ICD-10-CM

## 2017-11-13 DIAGNOSIS — R07.89 CHEST PRESSURE: ICD-10-CM

## 2017-11-13 PROCEDURE — 99214 OFFICE O/P EST MOD 30 MIN: CPT | Mod: S$GLB,,, | Performed by: NURSE PRACTITIONER

## 2017-11-13 PROCEDURE — 99999 PR PBB SHADOW E&M-EST. PATIENT-LVL III: CPT | Mod: PBBFAC,,, | Performed by: NURSE PRACTITIONER

## 2017-11-13 RX ORDER — ESCITALOPRAM OXALATE 10 MG/1
10 TABLET ORAL DAILY
Qty: 30 TABLET | Refills: 3 | Status: SHIPPED | OUTPATIENT
Start: 2017-11-13 | End: 2017-12-01 | Stop reason: DRUGHIGH

## 2017-11-13 NOTE — PROGRESS NOTES
Subjective:       Patient ID: Eve Kuhn is a 27 y.o. female.    Chief Complaint: Anxiety; Shaking (started Saturday ); Nausea; Tachycardia; and Chest Pain    Experienced anxiety like this about two months ago. Anxiety episodes becoming more frequent. Started Saturday with anxiety/ nervousness and dizziness, chest discomfort began this AM with racing heart. Discomfort is constant and reproducible on palpation. Radiates along collar bone and up left side of neck.  Is having some familial stress the past two year, but no acute changes in her life. Drinks 1 cup of coffee a day. Takes no medications besides shanice prn for her headaches      Anxiety   Presents for initial visit. Onset was in the past 7 days. The problem has been unchanged. Symptoms include chest pain, decreased concentration, dizziness, nervous/anxious behavior, palpitations and restlessness. Patient reports no confusion, depressed mood, excessive worry, hyperventilation, insomnia, nausea, obsessions or shortness of breath. Symptoms occur constantly. The most recent episode lasted 3 days. The severity of symptoms is interfering with daily activities. Nothing aggravates the symptoms. The quality of sleep is good.     There are no known risk factors. Her past medical history is significant for anxiety/panic attacks. Past treatments include nothing.     Vitals:    11/13/17 1044   BP: 110/78   Pulse: 87   Resp: 16   Temp: 98.5 °F (36.9 °C)     Review of Systems   Constitutional: Negative for activity change, diaphoresis, fatigue, fever and unexpected weight change.   HENT: Negative.  Negative for hearing loss, rhinorrhea and trouble swallowing.    Eyes: Negative.  Negative for discharge and visual disturbance.   Respiratory: Positive for chest tightness. Negative for cough, shortness of breath and wheezing.    Cardiovascular: Positive for chest pain and palpitations. Negative for leg swelling.   Gastrointestinal: Negative for abdominal pain,  blood in stool, constipation, diarrhea, nausea and vomiting.   Endocrine: Negative.  Negative for polydipsia and polyuria.   Genitourinary: Negative for difficulty urinating, dysuria, hematuria and menstrual problem.   Musculoskeletal: Positive for neck pain. Negative for arthralgias and joint swelling.   Skin: Negative for color change and rash.   Allergic/Immunologic: Negative.    Neurological: Positive for dizziness and headaches. Negative for speech difficulty, weakness and numbness.   Hematological: Negative.    Psychiatric/Behavioral: Positive for decreased concentration. Negative for confusion, dysphoric mood and sleep disturbance. The patient is nervous/anxious. The patient does not have insomnia.        Past Medical History:   Diagnosis Date    Abnormal Pap smear     Androgenic alopecia     Chronic rhinitis     Hair loss 1/30/2017    Headache(784.0)     IBS (irritable bowel syndrome)      Objective:      Physical Exam   Constitutional: She is oriented to person, place, and time. She appears well-developed and well-nourished.   HENT:   Head: Normocephalic and atraumatic.   Mouth/Throat: Oropharynx is clear and moist.   Eyes: Conjunctivae and EOM are normal. Pupils are equal, round, and reactive to light.   Neck: Neck supple.   Cardiovascular: Normal rate, regular rhythm, normal heart sounds and intact distal pulses.    Pulmonary/Chest: Effort normal and breath sounds normal. She exhibits tenderness.       Tenderness on palpation   Abdominal: Soft. Bowel sounds are normal.   Musculoskeletal: Normal range of motion.   Neurological: She is alert and oriented to person, place, and time.   Skin: Skin is warm and dry.   Psychiatric: Her behavior is normal. Her mood appears anxious.   Nursing note and vitals reviewed.      Assessment:       1. Anxiety    2. Migraine without status migrainosus, not intractable, unspecified migraine type    3. Chest pressure    4. Tachycardia    5. Dizziness    6. Vitamin D  deficiency        Plan:       Anxiety  -     escitalopram oxalate (LEXAPRO) 10 MG tablet; Take 1 tablet (10 mg total) by mouth once daily.  Dispense: 30 tablet; Refill: 3    Migraine without status migrainosus, not intractable, unspecified migraine type  Taking PRN meds     Chest pressure  Tachycardia  Dizziness  All related to anxiety   Talked to her and she is wanting to start meds for this   Discussed start 1/2 tablet and increase to 1 tablet   Discussed needs FU 1 month     Vitamin D deficiency  Stable on replacement       FU in 1 month

## 2017-12-01 ENCOUNTER — PATIENT MESSAGE (OUTPATIENT)
Dept: FAMILY MEDICINE | Facility: CLINIC | Age: 27
End: 2017-12-01

## 2017-12-01 ENCOUNTER — TELEPHONE (OUTPATIENT)
Dept: FAMILY MEDICINE | Facility: CLINIC | Age: 27
End: 2017-12-01

## 2017-12-01 RX ORDER — SERTRALINE HYDROCHLORIDE 25 MG/1
25 TABLET, FILM COATED ORAL DAILY
Qty: 30 TABLET | Refills: 11 | Status: SHIPPED | OUTPATIENT
Start: 2017-12-01 | End: 2018-03-23 | Stop reason: ALTCHOICE

## 2018-01-08 ENCOUNTER — PATIENT MESSAGE (OUTPATIENT)
Dept: FAMILY MEDICINE | Facility: CLINIC | Age: 28
End: 2018-01-08

## 2018-01-10 ENCOUNTER — OFFICE VISIT (OUTPATIENT)
Dept: FAMILY MEDICINE | Facility: CLINIC | Age: 28
End: 2018-01-10
Payer: COMMERCIAL

## 2018-01-10 VITALS
TEMPERATURE: 99 F | HEIGHT: 67 IN | DIASTOLIC BLOOD PRESSURE: 70 MMHG | BODY MASS INDEX: 20.97 KG/M2 | SYSTOLIC BLOOD PRESSURE: 98 MMHG | WEIGHT: 133.63 LBS | HEART RATE: 93 BPM | OXYGEN SATURATION: 98 %

## 2018-01-10 DIAGNOSIS — J02.9 PHARYNGITIS, UNSPECIFIED ETIOLOGY: ICD-10-CM

## 2018-01-10 DIAGNOSIS — K12.1 MOUTH ULCERS: Primary | ICD-10-CM

## 2018-01-10 DIAGNOSIS — R09.81 NASAL CONGESTION: ICD-10-CM

## 2018-01-10 LAB
CTP QC/QA: YES
S PYO RRNA THROAT QL PROBE: NEGATIVE

## 2018-01-10 PROCEDURE — 87880 STREP A ASSAY W/OPTIC: CPT | Mod: QW,S$GLB,, | Performed by: NURSE PRACTITIONER

## 2018-01-10 PROCEDURE — 99999 PR PBB SHADOW E&M-EST. PATIENT-LVL III: CPT | Mod: PBBFAC,,, | Performed by: NURSE PRACTITIONER

## 2018-01-10 PROCEDURE — 99213 OFFICE O/P EST LOW 20 MIN: CPT | Mod: S$GLB,,, | Performed by: NURSE PRACTITIONER

## 2018-01-10 NOTE — PROGRESS NOTES
Subjective:       Patient ID: Eve Kuhn is a 27 y.o. female.  First time seeing pt . Last seen by JOHN Smith on 11/13/2017.    Chief Complaint: Mouth Lesions (bottom inside lip, noticed Sunday)    Pt reports 2 year old son goes to day care and he is sick with URI.    She c/o mouth ulcers since Sunday. She also has been having an URI for about a month and has been taking benadryl at night.  Denies fever.       Mouth Lesions    The current episode started 3 to 5 days ago. The onset was sudden. The problem occurs occasionally. The problem has been gradually worsening. The symptoms are relieved by one or more OTC medications. Associated symptoms include diarrhea, congestion, mouth sores, rhinorrhea, sore throat, cough and URI. Pertinent negatives include no fever, no nausea and no vomiting. She has been experiencing a moderate sore throat. The sore throat is characterized by pain only. She has been eating and drinking normally. There were sick contacts at home.     Vitals:    01/10/18 0830   BP: 98/70   Pulse: 93   Temp: 98.8 °F (37.1 °C)     Review of Systems   Constitutional: Negative for chills, diaphoresis and fever.   HENT: Positive for congestion, mouth sores, rhinorrhea and sore throat.    Eyes: Negative for visual disturbance.   Respiratory: Positive for cough. Negative for chest tightness.    Cardiovascular: Negative for chest pain.   Gastrointestinal: Positive for diarrhea. Negative for nausea and vomiting.   Skin:        Ulcers x2 to lower lip.       Objective:      Physical Exam   Constitutional: She is oriented to person, place, and time. Vital signs are normal. She appears well-developed and well-nourished. She is cooperative.   HENT:   Head: Normocephalic and atraumatic.   Right Ear: Hearing, external ear and ear canal normal. A middle ear effusion is present.   Left Ear: Hearing, external ear and ear canal normal. A middle ear effusion is present.   Nose: Mucosal edema present.    Mouth/Throat: Uvula is midline and mucous membranes are normal. Oral lesions present. Posterior oropharyngeal erythema present.       Eyes: Conjunctivae, EOM and lids are normal.   Neck: Normal range of motion. Neck supple.   Cardiovascular: Normal rate, regular rhythm, S1 normal, S2 normal and normal heart sounds.    Pulmonary/Chest: Effort normal and breath sounds normal. No respiratory distress.   Lymphadenopathy:        Head (right side): No submental, no submandibular, no tonsillar, no preauricular and no posterior auricular adenopathy present.        Head (left side): No submental, no submandibular, no tonsillar, no preauricular and no posterior auricular adenopathy present.     She has no cervical adenopathy.   Neurological: She is alert and oriented to person, place, and time.   Skin: Skin is warm, dry and intact. Capillary refill takes less than 2 seconds.   Psychiatric: She has a normal mood and affect. Her speech is normal and behavior is normal. Judgment and thought content normal.   Nursing note and vitals reviewed.      Assessment & Plan:       Mouth ulcers  -     (Magic mouthwash) 1:1:1 Benadryl 12.5mg/5ml liq, aluminum & magnesium hydroxide-simehticone (Maalox), lidocaine viscous 2%; Swish and spit 15 mLs every 4 (four) hours as needed. for mouth sores  Dispense: 360 mL; Refill: 0    Nasal Congestion   May trail Mucinex OTC as directed for congestion.   Instructed to increase water intake and stay well hydrated.     Pharyngitis, unspecified etiology  -     POCT Rapid Strep A, negative in clinic.    Explained viral etiology of symptoms.  Pt verbalized understanding of plan of care.      Return if symptoms worsen or fail to improve.

## 2018-01-10 NOTE — PATIENT INSTRUCTIONS
Canker Sore    A canker sore (also called an aphthous ulcer) is a painful sore on the lining of the mouth. It is most painful during the first few days, and it lasts about 7 to 14 days before going away.  Causes  Canker sores are not cold sores or fever blisters. They are not contagious, so they are not spread by contact. The exact cause of canker sores is not clear, but there are a number of things that can trigger them in different people.  · Mild injury, such as biting the inside of the mouth, lip, or cheek, or dental procedures  · Stress  · Poor diet, or lack of certain nutrients, including B vitamins and iron  · Foods that can irritate the mouth, including tomatoes, citrus fruits, and some nuts (foods that are acidic or contain bitter substances called tannins)  · Irritating chemicals, such as those in some toothpastes and mouthwashes  · Certain chronic illnesses  Symptoms  Canker sores are found on the lining of the mouth. They can be inside the cheeks or lips, on the roof of the mouth, at the base of the gums, on the tongue, or in the back of the throat. Canker sores typically have these characteristics:  · Small, flat (not raised) sores  · Can be white or yellowish bumps that are red around the edges or have a red halo  · Usually small in size, roundish, and in groups  · Accompanied by pain or burning  Canker sores do not leave a scar. But they usually come back.  Home care  The goals of canker sore treatment are to decrease the pain, speed healing, and prevent recurrence. No single treatment works for everyone. Try a number of techniques to see what works best.  General care  · You may find that soft, easy-to-chew foods cause less pain. Use a straw to direct liquids away from the sore.  · Use a soft-bristle toothbrush, and brush your teeth gently.  · Avoid acidic, salty, or spicy foods.  · Avoid injuring the inside of your mouth, or scraping your existing canker sores, by avoiding crusty and crunchy foods  like french bread and chips.  Medicines  You can try over-the-counter medicines that cover the sores and numb them. This protects the sores while they heal and helps reduce pain.  Homemade rinses and solutions  You can use these solutions as mouth rinses. Spit them out after using them. You can also dab them on the sores. You can repeat these treatments as often as needed.  · Rinse your mouth with saltwater.  · Mix equal amounts of hydrogen peroxide and water. You can use it as a mouthwash or dab it on spots with a cotton swab. You can also add sodium bicarbonate to this to make a paste, and then dab it on spots.  Follow-up care  Follow up with your healthcare provider, or as advised.  · If a culture was done, you will be notified if the treatment needs to be changed. You can call as directed for the results.  Call 911  Contact emergency services if any of these occur:  · Trouble breathing  · Inability to swallow  · Extreme drowsiness or trouble awakening  · Fainting or loss of consciousness  · Rapid heart rate  · Seizure  · Stiff neck  When to seek medical advice  Call your healthcare provider right away if any of these occur:  · You have a fever of 100.4°F (38°C) or higher.  · You are pregnant.  · You just had surgery or another medical procedure, or you were just discharged from the hospital.  · You are unable to eat or swallow due to pain.  Date Last Reviewed: 7/30/2015  © 0801-8597 [employer]. [address] [place] [address], [place] [address]. All rights reserved. This information is not intended as a substitute for professional medical care. Always follow your healthcare professional's instructions.

## 2018-01-12 ENCOUNTER — OFFICE VISIT (OUTPATIENT)
Dept: FAMILY MEDICINE | Facility: CLINIC | Age: 28
End: 2018-01-12
Payer: COMMERCIAL

## 2018-01-12 VITALS
WEIGHT: 133.63 LBS | DIASTOLIC BLOOD PRESSURE: 60 MMHG | SYSTOLIC BLOOD PRESSURE: 116 MMHG | OXYGEN SATURATION: 99 % | HEIGHT: 67 IN | BODY MASS INDEX: 20.97 KG/M2 | HEART RATE: 83 BPM | TEMPERATURE: 98 F

## 2018-01-12 DIAGNOSIS — B34.9 VIRAL ILLNESS: ICD-10-CM

## 2018-01-12 DIAGNOSIS — R09.81 NASAL CONGESTION: ICD-10-CM

## 2018-01-12 DIAGNOSIS — K12.1 MOUTH ULCER: Primary | ICD-10-CM

## 2018-01-12 DIAGNOSIS — R05.9 COUGH: ICD-10-CM

## 2018-01-12 PROCEDURE — 99999 PR PBB SHADOW E&M-EST. PATIENT-LVL III: CPT | Mod: PBBFAC,,, | Performed by: NURSE PRACTITIONER

## 2018-01-12 PROCEDURE — 99213 OFFICE O/P EST LOW 20 MIN: CPT | Mod: S$GLB,,, | Performed by: NURSE PRACTITIONER

## 2018-01-12 RX ORDER — TRIAMCINOLONE ACETONIDE 1 MG/G
PASTE DENTAL 2 TIMES DAILY
Qty: 5 G | Refills: 1 | Status: SHIPPED | OUTPATIENT
Start: 2018-01-12 | End: 2018-05-08 | Stop reason: SDUPTHER

## 2018-01-12 NOTE — PROGRESS NOTES
Subjective:       Patient ID: Eve Kuhn is a 27 y.o. female.    Chief Complaint: Fever (started 2 days ago); Cough (causing tighness/pressure in chest); and Nasal Congestion    Fever    This is a new problem. The current episode started yesterday. The problem occurs intermittently. The problem has been waxing and waning. The maximum temperature noted was 100 to 100.9 F. The temperature was taken using an oral thermometer. Associated symptoms include congestion, coughing and a sore throat. Pertinent negatives include no abdominal pain, chest pain, diarrhea, ear pain, headaches, muscle aches, nausea, rash, sleepiness, urinary pain, vomiting or wheezing. She has tried nothing for the symptoms.   Cough   This is a new problem. The current episode started in the past 7 days. The problem has been gradually worsening. The problem occurs constantly. The cough is productive of purulent sputum. Associated symptoms include a fever and a sore throat. Pertinent negatives include no chest pain, chills, ear congestion, ear pain, headaches, heartburn, hemoptysis, myalgias, nasal congestion, postnasal drip, rash, rhinorrhea, shortness of breath, sweats, weight loss or wheezing. Nothing aggravates the symptoms. The treatment provided no relief.     Vitals:    01/12/18 0918   BP: 116/60   Pulse: 83   Temp: 98.3 °F (36.8 °C)     Review of Systems   Constitutional: Positive for activity change and fever. Negative for chills, diaphoresis, fatigue, unexpected weight change and weight loss.   HENT: Positive for congestion, mouth sores and sore throat. Negative for ear pain, hearing loss, postnasal drip, rhinorrhea and trouble swallowing.    Eyes: Negative.  Negative for discharge and visual disturbance.   Respiratory: Positive for cough. Negative for hemoptysis, chest tightness, shortness of breath and wheezing.    Cardiovascular: Negative.  Negative for chest pain and palpitations.   Gastrointestinal: Negative.  Negative  for abdominal pain, blood in stool, constipation, diarrhea, heartburn, nausea and vomiting.   Endocrine: Negative.  Negative for polydipsia and polyuria.   Genitourinary: Negative.  Negative for difficulty urinating, dysuria, hematuria and menstrual problem.   Musculoskeletal: Positive for neck pain. Negative for arthralgias, joint swelling and myalgias.   Skin: Negative.  Negative for color change and rash.   Allergic/Immunologic: Negative.    Neurological: Negative.  Negative for speech difficulty, weakness, numbness and headaches.   Hematological: Negative.    Psychiatric/Behavioral: Negative.  Negative for confusion and dysphoric mood.       Past Medical History:   Diagnosis Date    Abnormal Pap smear     Androgenic alopecia     Chronic rhinitis     Hair loss 1/30/2017    Headache(784.0)     IBS (irritable bowel syndrome)      Objective:      Physical Exam   Constitutional: She is oriented to person, place, and time. She appears well-developed and well-nourished.   HENT:   Head: Normocephalic and atraumatic.   Right Ear: Hearing, tympanic membrane and ear canal normal.   Left Ear: Hearing, tympanic membrane and ear canal normal.   Nose: No mucosal edema or rhinorrhea. Right sinus exhibits no maxillary sinus tenderness and no frontal sinus tenderness. Left sinus exhibits no maxillary sinus tenderness.   Mouth/Throat: Oropharynx is clear and moist. Oral lesions present.       Eyes: Conjunctivae and EOM are normal. Pupils are equal, round, and reactive to light.   Neck: Neck supple.   Cardiovascular: Normal rate, regular rhythm, normal heart sounds and intact distal pulses.  Exam reveals no friction rub.    No murmur heard.  Pulmonary/Chest: Effort normal and breath sounds normal. No respiratory distress. She has no wheezes. She has no rales.   Abdominal: Soft. Bowel sounds are normal.   Musculoskeletal: Normal range of motion.   Neurological: She is alert and oriented to person, place, and time.   Skin: Skin  is warm and dry.   Psychiatric: She has a normal mood and affect. Her behavior is normal. Judgment and thought content normal.   Nursing note and vitals reviewed.      Assessment:       1. Mouth ulcer    2. Nasal congestion    3. Cough    4. Viral illness        Plan:       Mouth ulcer  -     triamcinolone acetonide 0.1% (KENALOG) 0.1 % paste; Place onto teeth 2 (two) times daily.  Dispense: 5 g; Refill: 1    Nasal congestion  Cough  Viral illness    Discussed that since still with fever and cough - still viral illness   Discussed that take symptom control and call if not better

## 2018-02-19 ENCOUNTER — OFFICE VISIT (OUTPATIENT)
Dept: FAMILY MEDICINE | Facility: CLINIC | Age: 28
End: 2018-02-19
Payer: COMMERCIAL

## 2018-02-19 VITALS
WEIGHT: 137.38 LBS | BODY MASS INDEX: 21.56 KG/M2 | DIASTOLIC BLOOD PRESSURE: 80 MMHG | HEART RATE: 71 BPM | SYSTOLIC BLOOD PRESSURE: 130 MMHG | OXYGEN SATURATION: 97 % | HEIGHT: 67 IN | TEMPERATURE: 99 F

## 2018-02-19 DIAGNOSIS — Z23 NEED FOR PNEUMOCOCCAL VACCINE: ICD-10-CM

## 2018-02-19 DIAGNOSIS — E55.9 VITAMIN D DEFICIENCY: ICD-10-CM

## 2018-02-19 DIAGNOSIS — Z00.00 WELLNESS EXAMINATION: Primary | ICD-10-CM

## 2018-02-19 PROCEDURE — 99395 PREV VISIT EST AGE 18-39: CPT | Mod: 25,S$GLB,, | Performed by: NURSE PRACTITIONER

## 2018-02-19 PROCEDURE — 90471 IMMUNIZATION ADMIN: CPT | Mod: S$GLB,,, | Performed by: INTERNAL MEDICINE

## 2018-02-19 PROCEDURE — 90686 IIV4 VACC NO PRSV 0.5 ML IM: CPT | Mod: S$GLB,,, | Performed by: INTERNAL MEDICINE

## 2018-02-19 PROCEDURE — 90472 IMMUNIZATION ADMIN EACH ADD: CPT | Mod: S$GLB,,, | Performed by: INTERNAL MEDICINE

## 2018-02-19 PROCEDURE — 99999 PR PBB SHADOW E&M-EST. PATIENT-LVL III: CPT | Mod: PBBFAC,,, | Performed by: NURSE PRACTITIONER

## 2018-02-19 PROCEDURE — 90670 PCV13 VACCINE IM: CPT | Mod: S$GLB,,, | Performed by: INTERNAL MEDICINE

## 2018-02-19 NOTE — PROGRESS NOTES
Subjective:       Patient ID: Eve Kuhn is a 27 y.o. female.  Last seen by  JOHN Smith NP on 1/12/2018.    Chief Complaint: Annual Exam (needs booster ( pneumonia)) and Labs Only    HPI  Vitals:    02/19/18 0944   BP: 130/80   Pulse: 71   Temp: 98.6 °F (37 °C)     Review of Systems   Constitutional: Negative for chills, diaphoresis and fever.   HENT: Negative for congestion, ear discharge, ear pain, mouth sores, postnasal drip, rhinorrhea, sinus pain, sinus pressure, sneezing, sore throat and voice change.    Eyes: Negative for visual disturbance.   Respiratory: Negative for cough, chest tightness and shortness of breath.    Cardiovascular: Negative for chest pain.   Gastrointestinal: Negative for abdominal distention, constipation, diarrhea and vomiting.   Genitourinary: Negative for difficulty urinating, dysuria, flank pain, frequency and hematuria.       Objective:      Physical Exam   Constitutional: She is oriented to person, place, and time. Vital signs are normal. She appears well-developed and well-nourished. She is cooperative.   HENT:   Head: Normocephalic and atraumatic.   Right Ear: Hearing, tympanic membrane, external ear and ear canal normal.   Left Ear: Hearing, tympanic membrane, external ear and ear canal normal.   Nose: Nose normal.   Mouth/Throat: Uvula is midline, oropharynx is clear and moist and mucous membranes are normal.   Eyes: Conjunctivae, EOM and lids are normal. Pupils are equal, round, and reactive to light.   Neck: Normal range of motion. Neck supple.   Cardiovascular: Normal rate, regular rhythm, S1 normal, S2 normal, normal heart sounds and normal pulses.    Pulmonary/Chest: Effort normal and breath sounds normal. No respiratory distress.   Abdominal: Soft. Bowel sounds are normal. She exhibits no distension. There is no tenderness.   Musculoskeletal: Normal range of motion.   Lymphadenopathy:        Head (right side): No submental, no submandibular, no tonsillar,  no preauricular and no posterior auricular adenopathy present.        Head (left side): No submental, no submandibular, no tonsillar, no preauricular and no posterior auricular adenopathy present.   Neurological: She is alert and oriented to person, place, and time.   Skin: Skin is warm, dry and intact. Capillary refill takes less than 2 seconds.   Psychiatric: She has a normal mood and affect. Her speech is normal and behavior is normal. Judgment and thought content normal.   Nursing note and vitals reviewed.      Assessment & Plan:       Wellness examination  -     CBC auto differential; Future; Expected date: 02/19/2018  -     Comprehensive metabolic panel; Future; Expected date: 02/19/2018  -     TSH; Future; Expected date: 02/19/2018  -     Lipid panel; Future; Expected date: 02/19/2018    Vitamin D deficiency  -     Vitamin D; Future; Expected date: 02/19/2018    Need for pneumococcal vaccine  -     (In Office Administered) Pneumococcal Conjugate Vaccine (13 Valent) (IM)    Other orders  -     Influenza - Quadrivalent (3 years & older) (PF)    Pt reports her Allergy Md wants her to get the pneumococcal vaccine. Pt reports she has not had one before. No data in LINKS for pt.       Follow-up if symptoms worsen or fail to improve.

## 2018-02-19 NOTE — PATIENT INSTRUCTIONS
Vitamin D  Does this test have other names?  25-hydroxyvitamin D (25-high-DROX-ee-VIE-tuh-min D), 25(OH)D  What is this test?  Vitamin D is mainly found in fortified dairy foods, juice, breakfast cereal, and certain fish. This vitamin plays many roles in the body. But because it helps the body absorb calcium from foods and supplements, it's particularly important for bone health. Vitamin D has many additional roles in the body.  Vitamin D comes in several forms. When ultraviolet light, such as sunlight, hits your skin, it creates vitamin D3. D2 is used to fortify dairy foods. Both of these are further processed by your liver and kidneys into a form your body can use. Most tests for vitamin D check the level of a form circulating in the body called 25-hydroxyvitamin D, also called 25(OH)D.   Why do I need this test?  Vitamin D testing has become much more popular in recent years. Your healthcare provider may check your vitamin D levels to find out if you have any risks to bone health. These might be:  · Low calcium  · Soft bones caused by low vitamin D or problems using it (osteomalacia)  · Osteopenia  · Osteoporosis  · Rickets, in children  You may also need this test if you are at risk for low vitamin D levels. Risks include:  · Being an older adult  · Having difficulty absorbing fat from your diet  · Having chronic kidney disease  · Have dark skin pigmentation  · Being a  baby  Vitamin D has many effects in the body. You may need this test to help your provider diagnose or treat:  · Problems with the parathyroid gland  · Cancer  · Autoimmune diseases such as multiple sclerosis and Crohn's disease  · Psoriasis  · Asthma  · Weakness or falls    What other tests might I have along with this test?  A healthcare provider may also want to check your parathyroid hormone levels and your calcium levels.   What do my test results mean?  A result for a lab test may be affected by many things, including the method  the laboratory uses to do the test. If your test results are different from the normal value, you may not have a problem. To learn what the results mean for you, talk with your healthcare provider.  Children and adults need more than 30 nanograms per milliliter (ng/ml) of vitamin D. The optimal level of 25(OH)D is usually between 30 and 60 ng/mL. Recommended daily amounts range from 400 to 800 international units (IU) per day based on your age.  Levels lower than normal can mean you are:  · Not making enough vitamin D on your own  · Not getting enough vitamin D in your diet  · Not absorbing vitamin D from your food as you should  Lower levels may also mean that your body is not converting the vitamin as it should. This might be because of kidney or liver disease.  Above-normal levels may be a sign that you're taking too much in supplement form.   How is this test done?  The test requires a blood sample, which is drawn through a needle from a vein in your arm.  Does this test pose any risks?  Taking a blood sample with a needle carries risks that include bleeding, infection, bruising, and a sense of lightheadedness. When the needle pricks your arm, you may feel a slight stinging sensation or pain. Afterward, the site may be slightly sore.   What might affect my test results?  The amount of time you spend in the sunlight, your diet, and whether you take vitamin D in supplement form can affect your vitamin D levels. Ask your healthcare provider if any health conditions you have or medicines you take could affect your results.  How do I get ready for this test?  Tell your healthcare provider if you take vitamin D supplements. Also be sure your provider knows about all medicines, herbs, vitamins, and supplements you are taking. This includes medicines that don't need a prescription and any illicit drugs you may use.   © 6361-1365 [employer]. [address] [place] [address], [place] [address]. All rights reserved.  This information is not intended as a substitute for professional medical care. Always follow your healthcare professional's instructions.

## 2018-03-09 ENCOUNTER — LAB VISIT (OUTPATIENT)
Dept: LAB | Facility: HOSPITAL | Age: 28
End: 2018-03-09
Attending: NURSE PRACTITIONER
Payer: COMMERCIAL

## 2018-03-09 ENCOUNTER — TELEPHONE (OUTPATIENT)
Dept: FAMILY MEDICINE | Facility: CLINIC | Age: 28
End: 2018-03-09

## 2018-03-09 DIAGNOSIS — Z00.00 WELLNESS EXAMINATION: ICD-10-CM

## 2018-03-09 DIAGNOSIS — E55.9 VITAMIN D DEFICIENCY: ICD-10-CM

## 2018-03-09 DIAGNOSIS — E55.9 VITAMIN D INSUFFICIENCY: Primary | ICD-10-CM

## 2018-03-09 LAB
25(OH)D3+25(OH)D2 SERPL-MCNC: 13 NG/ML
ALBUMIN SERPL BCP-MCNC: 4.3 G/DL
ALP SERPL-CCNC: 56 U/L
ALT SERPL W/O P-5'-P-CCNC: 11 U/L
ANION GAP SERPL CALC-SCNC: 10 MMOL/L
AST SERPL-CCNC: 14 U/L
BASOPHILS # BLD AUTO: 0.04 K/UL
BASOPHILS NFR BLD: 0.6 %
BILIRUB SERPL-MCNC: 0.6 MG/DL
BUN SERPL-MCNC: 11 MG/DL
CALCIUM SERPL-MCNC: 9.6 MG/DL
CHLORIDE SERPL-SCNC: 106 MMOL/L
CHOLEST SERPL-MCNC: 183 MG/DL
CHOLEST/HDLC SERPL: 3.8 {RATIO}
CO2 SERPL-SCNC: 26 MMOL/L
CREAT SERPL-MCNC: 0.8 MG/DL
DIFFERENTIAL METHOD: ABNORMAL
EOSINOPHIL # BLD AUTO: 0.1 K/UL
EOSINOPHIL NFR BLD: 2 %
ERYTHROCYTE [DISTWIDTH] IN BLOOD BY AUTOMATED COUNT: 13.6 %
EST. GFR  (AFRICAN AMERICAN): >60 ML/MIN/1.73 M^2
EST. GFR  (NON AFRICAN AMERICAN): >60 ML/MIN/1.73 M^2
GLUCOSE SERPL-MCNC: 89 MG/DL
HCT VFR BLD AUTO: 43 %
HDLC SERPL-MCNC: 48 MG/DL
HDLC SERPL: 26.2 %
HGB BLD-MCNC: 13.7 G/DL
IMM GRANULOCYTES # BLD AUTO: 0.02 K/UL
IMM GRANULOCYTES NFR BLD AUTO: 0.3 %
LDLC SERPL CALC-MCNC: 116 MG/DL
LYMPHOCYTES # BLD AUTO: 2.4 K/UL
LYMPHOCYTES NFR BLD: 33.6 %
MCH RBC QN AUTO: 27.7 PG
MCHC RBC AUTO-ENTMCNC: 31.9 G/DL
MCV RBC AUTO: 87 FL
MONOCYTES # BLD AUTO: 0.4 K/UL
MONOCYTES NFR BLD: 5.7 %
NEUTROPHILS # BLD AUTO: 4.1 K/UL
NEUTROPHILS NFR BLD: 57.8 %
NONHDLC SERPL-MCNC: 135 MG/DL
NRBC BLD-RTO: 0 /100 WBC
PLATELET # BLD AUTO: 277 K/UL
PMV BLD AUTO: 11.1 FL
POTASSIUM SERPL-SCNC: 4.2 MMOL/L
PROT SERPL-MCNC: 7.7 G/DL
RBC # BLD AUTO: 4.94 M/UL
SODIUM SERPL-SCNC: 142 MMOL/L
TRIGL SERPL-MCNC: 95 MG/DL
TSH SERPL DL<=0.005 MIU/L-ACNC: 0.64 UIU/ML
WBC # BLD AUTO: 7.14 K/UL

## 2018-03-09 PROCEDURE — 82306 VITAMIN D 25 HYDROXY: CPT

## 2018-03-09 PROCEDURE — 80053 COMPREHEN METABOLIC PANEL: CPT

## 2018-03-09 PROCEDURE — 84443 ASSAY THYROID STIM HORMONE: CPT

## 2018-03-09 PROCEDURE — 36415 COLL VENOUS BLD VENIPUNCTURE: CPT | Mod: PO

## 2018-03-09 PROCEDURE — 85025 COMPLETE CBC W/AUTO DIFF WBC: CPT

## 2018-03-09 PROCEDURE — 80061 LIPID PANEL: CPT

## 2018-03-09 NOTE — TELEPHONE ENCOUNTER
Attempted to call pt on the telephone, left message that results and recommendations were sent to her pt portal and to call us with any questions or concerns.   Vit D level remains low at 13. This is considered Vit D insufficiency. It was 12 a year ago.  We would like to see it 30 or higher. All other labs within acceptable range.   Vit D, 25-Hydroxy 30 - 96 ng/mL 13   12CM      Comments: Vitamin D deficiency.........<10 ng/mL                               Vitamin D insufficiency......10-29 ng/mL       Vitamin D sufficiency........> or equal to 30 ng/mL   Vitamin D toxicity............>100 ng/mL      Recommend Vit D supplement OTC, initial supplementation with 800 to 1000 international units daily may be sufficient.   Repeat lab in 3 months, order placed.

## 2018-03-26 ENCOUNTER — OFFICE VISIT (OUTPATIENT)
Dept: NEUROLOGY | Facility: CLINIC | Age: 28
End: 2018-03-26
Payer: COMMERCIAL

## 2018-03-26 VITALS
SYSTOLIC BLOOD PRESSURE: 110 MMHG | HEIGHT: 67 IN | HEART RATE: 80 BPM | WEIGHT: 138.25 LBS | DIASTOLIC BLOOD PRESSURE: 68 MMHG | BODY MASS INDEX: 21.7 KG/M2

## 2018-03-26 DIAGNOSIS — G43.009 MIGRAINE WITHOUT AURA AND WITHOUT STATUS MIGRAINOSUS, NOT INTRACTABLE: Primary | ICD-10-CM

## 2018-03-26 PROCEDURE — 99999 PR PBB SHADOW E&M-EST. PATIENT-LVL III: CPT | Mod: PBBFAC,,, | Performed by: PSYCHIATRY & NEUROLOGY

## 2018-03-26 PROCEDURE — 99214 OFFICE O/P EST MOD 30 MIN: CPT | Mod: S$GLB,,, | Performed by: PSYCHIATRY & NEUROLOGY

## 2018-03-26 RX ORDER — TOPIRAMATE SPINKLE 15 MG/1
CAPSULE ORAL
Qty: 21 CAPSULE | Refills: 0 | Status: SHIPPED | OUTPATIENT
Start: 2018-03-26 | End: 2018-08-01 | Stop reason: ALTCHOICE

## 2018-03-26 RX ORDER — NARATRIPTAN 2.5 MG/1
TABLET ORAL
Qty: 9 TABLET | Refills: 6 | Status: SHIPPED | OUTPATIENT
Start: 2018-03-26 | End: 2018-04-20

## 2018-03-26 RX ORDER — PREDNISONE 10 MG/1
TABLET ORAL
Qty: 10 TABLET | Refills: 0 | Status: SHIPPED | OUTPATIENT
Start: 2018-03-26 | End: 2018-07-17

## 2018-03-26 RX ORDER — TOPIRAMATE 50 MG/1
50 TABLET, FILM COATED ORAL DAILY
Qty: 30 TABLET | Refills: 11 | Status: SHIPPED | OUTPATIENT
Start: 2018-03-26 | End: 2019-08-23

## 2018-03-26 NOTE — PROGRESS NOTES
This is a 28-year-old right-handed patient who has an established history of migraine headache.  The patient indicates that in the past she had benefit from taking topiramate but had discontinue that medication out of her wish to be treated with more natural ways for migraine headache.  Unfortunately, since stopping the topiramate, her headache syndrome has seem to return.    The patient indicates that she estimates that her headache frequency is increasing and that in the last month she's had 2 headaches both of which lasted at least 3 days although her current headache now has been present for 6 days.  The pain is a right hemicranial pain that starts in the temple but then seems to radiate to the right occipital region.  The headache is described as throbbing with variable intensity.  For example, at the present time, the headache intensity is rated 3-4/10 but that with movement the headache pain can accelerate quickly.  Headache is been associated with both nausea and vomiting as well as photophobia and phonophobia.    In the past, the patient would attempt to control the pain with ibuprofen 400 mg and repeat this in 2 hours.  However this has not been effective on this occasion.  Over the past weekend, she was seen in the emergency room where she received IV fluids for her dehydration but also received Compazine Benadryl Toradol combination of medications which seem to help briefly but the headache did return.      ROS:  GENERAL: No fever, chills, fatigability or weight loss.  SKIN: No rashes, itching or changes in color or texture of skin.  HEAD: No  recent head trauma.  EYES: Visual acuity fine. No photophobia, ocular pain or diplopia.  EARS: Denies ear pain, discharge or vertigo.  NOSE: No loss of smell, no epistaxis or postnasal drip.  MOUTH & THROAT: No hoarseness or change in voice. No excessive gum bleeding.  NODES: Denies swollen glands.  CHEST: Denies CLINE, cyanosis, wheezing, cough and sputum  production.  CARDIOVASCULAR: Denies chest pain, PND, orthopnea or reduced exercise tolerance.  ABDOMEN: Appetite fine. No weight loss. Denies diarrhea, abdominal pain, hematemesis or blood in stool.  URINARY: No flank pain, dysuria or hematuria.  MUSCULOSKELETAL: No joint stiffness or swelling. Denies back pain.  NEUROLOGIC: No history of seizures, paralysis, alteration of gait or coordination.    The patient's past history, family history and social history are reviewed with the patient and her medical records.  No changes are made.    PE:   VITAL SIGNS: Blood pressure 110/68, pulse 80, weight 62.7 kg, height 5 foot 7 inches, BMI 21.65  APPEARANCE: Well nourished, well developed, in no acute distress.    HEAD: Normocephalic, atraumatic.  EYES: PERRL. EOMI.  Non-icteric sclerae.    EARS: TM's intact. Light reflex normal. No retraction or perforation.    NOSE: Mucosa pink. Airway clear.  MOUTH & THROAT: No tonsillar enlargement. No pharyngeal erythema or exudate. No stridor.  NECK: Supple. No bruits.  CHEST: Lungs clear to auscultation.  CARDIOVASCULAR: Regular rhythm without significant murmurs.  MUSCULOSKELETAL:  No bony deformity seen.   NEUROLOGIC:   Mental Status:  The patient is well oriented to person, time, place, and situation.  The patient is attentive to the environment and cooperative for the exam.  Cranial Nerves: II-XII grossly intact. Fundoscopic exam is normal.  No hemorrhage, exudate or papilledema is present. The extraocular muscles are intact in the cardinal directions of gaze.  No ptosis is present. Facial features are symmetrical.  Speech is normal in fluency, diction, and phrasing.  Tongue protrudes in the midline.    Gait and Station:  Romberg is negative.  Good alternate armswing with normal gait.  Motor:  No downdrift of either arm when held at shoulder level.  Manual muscle testing of proximal and distal muscles of both upper and lower extremities is normal. Muscle mass is normal.  Muscle  tone is normal.  Sensory:  Intact both upper and lower extremities to pin prick, touch, and vibration.  Cerebellar:  Finger to nose done well.  Alternating movements intact.  No involuntary movements or tremor seen.  Reflexes:  Stretch reflexes are 2+ both upper and lower extremities.  Plantar stimulation is flexor bilaterally and no pathological reflexes are seen     ASSESSMENT:  1.  Migraine headache without aura, with status migrainosus, not intractable    RECOMMENDATIONS:  1.  To treat current headache, I would recommend redness on 30 mg a day with naratriptan 2.5 mg a day for 3 days.  2.  The patient had a good response to Topamax in the past and did not experience any of the undesirable side effects from that medication.  Therefore, I recommend that she restart Topamax and advance to 50 mg a day over the next 3 weeks.  3.  Keep accurate headache diary and report frequency through the patient portal  4.  Routine follow-up with neurology in 6 months.    This was a 35 minute visit with the patient with over 50% of time discussing the treatment of her current headache as well as prevention of future migraine.     This note is generated with speech recognition software and is subject to transcription error and sound alike phrases that may be missed by proofreading.

## 2018-03-27 ENCOUNTER — PATIENT MESSAGE (OUTPATIENT)
Dept: NEUROLOGY | Facility: CLINIC | Age: 28
End: 2018-03-27

## 2018-03-28 ENCOUNTER — PATIENT MESSAGE (OUTPATIENT)
Dept: NEUROLOGY | Facility: CLINIC | Age: 28
End: 2018-03-28

## 2018-04-20 ENCOUNTER — PATIENT MESSAGE (OUTPATIENT)
Dept: NEUROLOGY | Facility: CLINIC | Age: 28
End: 2018-04-20

## 2018-04-20 DIAGNOSIS — G43.009 MIGRAINE WITHOUT AURA AND WITHOUT STATUS MIGRAINOSUS, NOT INTRACTABLE: Primary | ICD-10-CM

## 2018-04-20 RX ORDER — RIZATRIPTAN BENZOATE 10 MG/1
10 TABLET ORAL
Qty: 9 TABLET | Refills: 3 | Status: SHIPPED | OUTPATIENT
Start: 2018-04-20 | End: 2018-05-20

## 2018-05-07 ENCOUNTER — PATIENT MESSAGE (OUTPATIENT)
Dept: FAMILY MEDICINE | Facility: CLINIC | Age: 28
End: 2018-05-07

## 2018-05-07 DIAGNOSIS — K12.1 MOUTH ULCER: ICD-10-CM

## 2018-05-08 ENCOUNTER — PATIENT OUTREACH (OUTPATIENT)
Dept: ADMINISTRATIVE | Facility: HOSPITAL | Age: 28
End: 2018-05-08

## 2018-05-08 RX ORDER — TRIAMCINOLONE ACETONIDE 1 MG/G
PASTE DENTAL 2 TIMES DAILY
Qty: 5 G | Refills: 1 | Status: SHIPPED | OUTPATIENT
Start: 2018-05-08 | End: 2018-06-07

## 2018-05-08 NOTE — PROGRESS NOTES
Health Maintenance Due   Topic Date Due    TETANUS VACCINE  03/19/2006    Pap Smear with HPV Cotest  03/31/2018

## 2018-05-16 ENCOUNTER — PATIENT OUTREACH (OUTPATIENT)
Dept: ADMINISTRATIVE | Facility: HOSPITAL | Age: 28
End: 2018-05-16

## 2018-05-16 NOTE — PROGRESS NOTES
Health Maintenance Due   Topic Date Due    TETANUS VACCINE  03/19/2006    Pap Smear with HPV Cotest  03/31/2018     Portal outreach un-read by patient.  Outreach mailed today

## 2018-05-16 NOTE — LETTER
May 16, 2018    Eve Kuhn  [address]  [facility] [address]             [facility]  [address]  [facility] [address]  Phone: [phone] Dear Ms. Kuhn:    We have tried to reach you by [facility] email unsuccessfully.      [facility] is committed to your overall health and would like to ensure that you are up to date on your recommended test and/or procedures.   Dr. Rivera has found that your chart shows you may be due for the following:     Tetanus Immunization   Pap smear     If you have had any of the above done at another facility, please let us know so that we may obtain copies from that facility.  If you have a copy of these records, please provide a copy for us to scan into your chart.  You are welcome to request that the report be faxed to us at  ([phone]).       Otherwise, please schedule these appointments at your earliest convenience by calling [phone] or going to [email or web address].        If you have any questions or concerns, please don't hesitate to call.    Sincerely,    Velvet Gonzales  Clinical Care Coordinator  [facility] [address] [place] [address]  Phone: [phone]   Fax: [phone]

## 2018-07-17 ENCOUNTER — OFFICE VISIT (OUTPATIENT)
Dept: PRIMARY CARE CLINIC | Facility: CLINIC | Age: 28
End: 2018-07-17
Payer: COMMERCIAL

## 2018-07-17 ENCOUNTER — LAB VISIT (OUTPATIENT)
Dept: LAB | Facility: HOSPITAL | Age: 28
End: 2018-07-17
Attending: NURSE PRACTITIONER
Payer: COMMERCIAL

## 2018-07-17 ENCOUNTER — NURSE TRIAGE (OUTPATIENT)
Dept: ADMINISTRATIVE | Facility: CLINIC | Age: 28
End: 2018-07-17

## 2018-07-17 VITALS
TEMPERATURE: 99 F | OXYGEN SATURATION: 99 % | HEIGHT: 67 IN | BODY MASS INDEX: 21.64 KG/M2 | DIASTOLIC BLOOD PRESSURE: 70 MMHG | HEART RATE: 90 BPM | SYSTOLIC BLOOD PRESSURE: 100 MMHG | WEIGHT: 137.88 LBS

## 2018-07-17 DIAGNOSIS — R11.0 NAUSEA: ICD-10-CM

## 2018-07-17 DIAGNOSIS — R10.11 RUQ ABDOMINAL PAIN: ICD-10-CM

## 2018-07-17 DIAGNOSIS — R10.811 RIGHT UPPER QUADRANT ABDOMINAL TENDERNESS WITHOUT REBOUND TENDERNESS: ICD-10-CM

## 2018-07-17 DIAGNOSIS — R19.7 DIARRHEA, UNSPECIFIED TYPE: ICD-10-CM

## 2018-07-17 DIAGNOSIS — R10.11 RUQ ABDOMINAL PAIN: Primary | ICD-10-CM

## 2018-07-17 LAB
ALBUMIN SERPL BCP-MCNC: 4.3 G/DL
ALP SERPL-CCNC: 57 U/L
ALT SERPL W/O P-5'-P-CCNC: 10 U/L
ANION GAP SERPL CALC-SCNC: 7 MMOL/L
AST SERPL-CCNC: 14 U/L
BASOPHILS # BLD AUTO: 0.02 K/UL
BASOPHILS NFR BLD: 0.2 %
BILIRUB SERPL-MCNC: 0.3 MG/DL
BUN SERPL-MCNC: 9 MG/DL
CALCIUM SERPL-MCNC: 9.4 MG/DL
CHLORIDE SERPL-SCNC: 108 MMOL/L
CO2 SERPL-SCNC: 29 MMOL/L
CREAT SERPL-MCNC: 0.8 MG/DL
DIFFERENTIAL METHOD: NORMAL
EOSINOPHIL # BLD AUTO: 0.1 K/UL
EOSINOPHIL NFR BLD: 1.3 %
ERYTHROCYTE [DISTWIDTH] IN BLOOD BY AUTOMATED COUNT: 13.5 %
EST. GFR  (AFRICAN AMERICAN): >60 ML/MIN/1.73 M^2
EST. GFR  (NON AFRICAN AMERICAN): >60 ML/MIN/1.73 M^2
GLUCOSE SERPL-MCNC: 93 MG/DL
HCT VFR BLD AUTO: 41.7 %
HGB BLD-MCNC: 13.6 G/DL
LYMPHOCYTES # BLD AUTO: 2.6 K/UL
LYMPHOCYTES NFR BLD: 32.2 %
MCH RBC QN AUTO: 28.3 PG
MCHC RBC AUTO-ENTMCNC: 32.6 G/DL
MCV RBC AUTO: 87 FL
MONOCYTES # BLD AUTO: 0.5 K/UL
MONOCYTES NFR BLD: 5.7 %
NEUTROPHILS # BLD AUTO: 5 K/UL
NEUTROPHILS NFR BLD: 60.6 %
PLATELET # BLD AUTO: 282 K/UL
PMV BLD AUTO: 10.6 FL
POTASSIUM SERPL-SCNC: 4.3 MMOL/L
PROT SERPL-MCNC: 7.5 G/DL
RBC # BLD AUTO: 4.8 M/UL
SODIUM SERPL-SCNC: 144 MMOL/L
WBC # BLD AUTO: 8.21 K/UL

## 2018-07-17 PROCEDURE — 80053 COMPREHEN METABOLIC PANEL: CPT | Mod: PO

## 2018-07-17 PROCEDURE — 99214 OFFICE O/P EST MOD 30 MIN: CPT | Mod: S$GLB,,, | Performed by: NURSE PRACTITIONER

## 2018-07-17 PROCEDURE — 85025 COMPLETE CBC W/AUTO DIFF WBC: CPT | Mod: PO

## 2018-07-17 PROCEDURE — 36415 COLL VENOUS BLD VENIPUNCTURE: CPT | Mod: PO

## 2018-07-17 PROCEDURE — 99999 PR PBB SHADOW E&M-EST. PATIENT-LVL IV: CPT | Mod: PBBFAC,,, | Performed by: NURSE PRACTITIONER

## 2018-07-17 RX ORDER — RIZATRIPTAN BENZOATE 10 MG/1
TABLET ORAL
Refills: 3 | COMMUNITY
Start: 2018-06-30 | End: 2018-08-20 | Stop reason: SDUPTHER

## 2018-07-17 NOTE — TELEPHONE ENCOUNTER
Reason for Disposition   Caller requesting lab results    Protocols used: ST PCP CALL - NO TRIAGE-A-    Pt requesting lab results. Pt advised per protocol and verbalizes understanding.

## 2018-07-17 NOTE — PROGRESS NOTES
Please call the patient and let her know that her blood count and her chemistry lab is all within normal limits. We will call her with results of her US. Thanks.

## 2018-07-17 NOTE — PROGRESS NOTES
Subjective:       Patient ID: Eve Kuhn is a 28 y.o. female.    Chief Complaint: Abdominal Pain (mid upper quad- started yesterday- N/V- able to keep food down- ) and Diarrhea (started yesterday- once yesterday- no fever)    Patient who is new to me presents with a new problem of RUQ pain. Patient noticed the pain yesterday at about 430 am after checking on her son. She did have some nausea and vomiting at that time. Since then its been a constant pain to her RUQ, she has been able to tolerate food, just some nausea. Also she had some diarrhea with the abdominal pain.       Abdominal Pain   This is a new problem. The current episode started yesterday. The onset quality is sudden. The problem occurs constantly. The problem has been unchanged. The pain is located in the RUQ. The abdominal pain does not radiate. Associated symptoms include diarrhea and nausea. Pertinent negatives include no arthralgias, constipation, dysuria, fever, frequency, headaches, myalgias or vomiting. The pain is aggravated by eating and certain positions. The pain is relieved by nothing. She has tried nothing for the symptoms. The treatment provided mild relief.     Review of Systems   Constitutional: Negative for chills, fatigue and fever.   HENT: Negative for congestion, sinus pain, sinus pressure, sneezing and sore throat.    Respiratory: Negative for cough, chest tightness, shortness of breath and wheezing.    Cardiovascular: Negative for chest pain, palpitations and leg swelling.   Gastrointestinal: Positive for abdominal pain, diarrhea and nausea. Negative for abdominal distention, anal bleeding, blood in stool, constipation and vomiting.   Genitourinary: Negative for decreased urine volume, difficulty urinating, dysuria, frequency and urgency.   Musculoskeletal: Negative for arthralgias, gait problem, joint swelling and myalgias.   Skin: Negative for rash and wound.   Neurological: Negative for dizziness,  light-headedness, numbness and headaches.       Objective:      Physical Exam   Constitutional: She is oriented to person, place, and time. She appears well-developed and well-nourished.   HENT:   Head: Normocephalic and atraumatic.   Right Ear: External ear normal.   Left Ear: External ear normal.   Nose: Nose normal.   Mouth/Throat: Oropharynx is clear and moist.   Eyes: Pupils are equal, round, and reactive to light.   Neck: Normal range of motion.   Cardiovascular: Normal rate, regular rhythm, normal heart sounds and intact distal pulses.    Pulmonary/Chest: Effort normal and breath sounds normal.   Abdominal: Soft. Bowel sounds are normal. There is tenderness in the right upper quadrant.   Musculoskeletal: Normal range of motion.   Neurological: She is alert and oriented to person, place, and time.   Skin: Skin is warm and dry.   Nursing note and vitals reviewed.      Assessment:       1. RUQ abdominal pain    2. Right upper quadrant abdominal tenderness without rebound tenderness    3. Nausea    4. Diarrhea, unspecified type        Plan:         RUQ abdominal pain  -     US Abdomen Limited; Future; Expected date: 07/17/2018  -     CBC auto differential; Future; Expected date: 07/17/2018  -     Comprehensive metabolic panel; Future; Expected date: 07/17/2018    Right upper quadrant abdominal tenderness without rebound tenderness  -     US Abdomen Limited; Future; Expected date: 07/17/2018  -     CBC auto differential; Future; Expected date: 07/17/2018  -     Comprehensive metabolic panel; Future; Expected date: 07/17/2018    Nausea  -     US Abdomen Limited; Future; Expected date: 07/17/2018  -     CBC auto differential; Future; Expected date: 07/17/2018  -     Comprehensive metabolic panel; Future; Expected date: 07/17/2018    Diarrhea, unspecified type  -     CBC auto differential; Future; Expected date: 07/17/2018  -     Comprehensive metabolic panel; Future; Expected date: 07/17/2018    Discussed with  patient handout and s/s that warrant ED evaluation. Increase hydration, and avoid fatty foods. Will follow up with studies. Follow up if symptoms worsen or fail to improve.

## 2018-07-17 NOTE — PATIENT INSTRUCTIONS
Abdominal Pain    Abdominal pain is pain in the stomach or belly area. Everyone has this pain from time to time. In many cases it goes away on its own. But abdominal pain can sometimes be due to a serious problem, such as appendicitis. So its important to know when to seek help.  Causes of abdominal pain  There are many possible causes of abdominal pain. Common causes in adults include:  · Constipation, diarrhea, or gas  · Stomach acid flowing back up into the esophagus (acid reflux or heartburn)  · Severe acid reflux, called GERD (gastroesophageal reflux disease)  · A sore in the lining of the stomach or small intestine (peptic ulcer)  · Inflammation of the gallbladder, liver, or pancreas  · Gallstones or kidney stones  · Appendicitis   · Intestinal blockage   · An internal organ pushing through a muscle or other tissue (hernia)  · Urinary tract infections  · In women, menstrual cramps, fibroids, or endometriosis  · Inflammation or infection of the intestines  Diagnosing the cause of abdominal pain  Your healthcare provider will do a physical exam help find the cause of your pain. If needed, tests will be ordered. Belly pain has many possible causes. So it can be hard to find the reason for your pain. Giving details about your pain can help. Tell your provider where and when you feel the pain, and what makes it better or worse. Also let your provider know if you have other symptoms such as:  · Fever  · Tiredness  · Upset stomach (nausea)  · Vomiting  · Changes in bathroom habits  Treating abdominal pain  Some causes of pain need emergency medical treatment right away. These include appendicitis or a bowel blockage. Other problems can be treated with rest, fluids, or medicines. Your healthcare provider can give you specific instructions for treatment or self-care based on what is causing your pain.  If you have vomiting or diarrhea, sip water or other clear fluids. When you are ready to eat solid foods again,  start with small amounts of easy-to-digest, low-fat foods. These include apple sauce, toast, or crackers.   When to seek medical care  Call 911 or go to the hospital right away if you:  · Cant pass stool and are vomiting  · Are vomiting blood or have bloody diarrhea or black, tarry diarrhea  · Have chest, neck, or shoulder pain  · Feel like you might pass out  · Have pain in your shoulder blades with nausea  · Have sudden, severe belly pain  · Have new, severe pain unlike any you have felt before  · Have a belly that is rigid, hard, and tender to touch  Call your healthcare provider if you have:  · Pain for more than 5 days  · Bloating for more than 2 days  · Diarrhea for more than 5 days  · A fever of 100.4°F (38.0°C) or higher, or as directed by your provider  · Pain that gets worse  · Weight loss for no reason  · Continued lack of appetite  · Blood in your stool  How to prevent abdominal pain  Here are some tips to help prevent abdominal pain:  · Eat smaller amounts of food at one time.  · Avoid greasy, fried, or other high-fat foods.  · Avoid foods that give you gas.  · Exercise regularly.  · Drink plenty of fluids.  To help prevent GERD symptoms:  · Quit smoking.  · Reduce alcohol and certain foods that increase stomach acid.  · Avoid aspirin and over-the-counter pain and fever medicines (NSAIDS or nonsteroidal anti-inflammatory drugs), if possible  · Lose extra weight.  · Finish eating at least 2 hours before you go to bed or lie down.  · Raise the head of your bed.  Date Last Reviewed: 7/1/2016  © 8476-0269 [employer]. [address] [place] [address], [place] [address]. All rights reserved. This information is not intended as a substitute for professional medical care. Always follow your healthcare professional's instructions.        Possible Gallstone with Biliary Colic (Presumed)    Your abdominal pain is may be due to spasm of the gallbladder. This is called gallbladder or biliary colic. The  gallbladder is a small sac under the liver, which stores and releases bile. Bile is a fluid that aids in the digestion of fat. Eating fatty food stimulates the gallbladder to contract, and release the bile. A gallstone may form in this sac. Although most people do not have symptoms, when the stone moves and blocks the passage of bile out of the bladder, it can cause pain and even an infection.  To be more certain of the diagnosis, you may need to have an ultrasound, CT-scan or other special test.  A number of things increase the risk for developing gallstones:  · Women are more likely  · Obesity  · Increasing age  · Losing or gaining weight quickly  · High calorie diet  · Pregnancy  · Hormone therapy  · Diabetes  The most common symptoms are:  · Abdominal pain, cramping, aching  · Nausea, vomiting  · Fever  Many illnesses can cause these symptoms. This pain usually starts in the upper right side of your abdomen. Sometimes it can radiate to your right shoulder, back and arm. It usually starts suddenly, becomes more intense quickly, and then gradually decreases and disappears over a couple of hours. Elderly people and diabetics may have difficulty showing where the pain is exactly.  Home care  · Rest in bed and follow a clear liquid diet until feeling better. If pain or nausea medicine was given to help with your symptoms, take these as directed.  · You can take acetaminophen or ibuprofen for pain, unless you were given a different pain medicine to use.Note: If you have chronic liver or kidney disease or ever had a stomach ulcer or GI bleeding or are taking blood thinner medications, talk with your doctor before using these medicines.  · Fat in your diet makes the gallbladder contract and may cause increased pain. Therefore, avoid fat in your diet over the next two days and follow a low-fat diet after that. If you are overweight, a low fat diet will help you lose weight.  Follow-up care  If a test was already  scheduled for you, keep this appointment. Be sure you know how to prepare yourself for the test. Usually, you will be asked not to eat or drink anything for at least 8 hours before the test. Schedule an appointment with your own doctor after your test is complete to discuss the findings.  When to seek medical advice  Call your healthcare provider if any of the following occur:  · Pain gets worse or moves to the right lower abdomen  · Repeated vomiting  · Swelling of the abdomen  · Pain lasts over 6 hours  · Fever of 100.4º F (38º C) or higher, or as directed by your healthcare provider  · Weakness, dizziness  · Dark urine or light colored stools  · Yellow color of the skin or eyes  · Chest, arm, back, neck or jaw pain  Call 911  Get emergency medical care right away if any of these occur:  · Trouble breathing  · Very confused  · Very drowsy or trouble awakening  · Fainting or loss of consciousness  · Rapid heart rate  Date Last Reviewed: 8/23/2015  © 4625-7100 [employer]. [address] [place] [address], [place] [address]. All rights reserved. This information is not intended as a substitute for professional medical care. Always follow your healthcare professional's instructions.

## 2018-07-17 NOTE — TELEPHONE ENCOUNTER
Pt informed of lab results and will call her with the US results after US is done on 7/17. Pt verbalized understanding.

## 2018-07-18 ENCOUNTER — HOSPITAL ENCOUNTER (OUTPATIENT)
Dept: RADIOLOGY | Facility: HOSPITAL | Age: 28
Discharge: HOME | End: 2018-07-18
Attending: NURSE PRACTITIONER
Payer: COMMERCIAL

## 2018-07-18 DIAGNOSIS — R10.11 RUQ ABDOMINAL PAIN: ICD-10-CM

## 2018-07-18 DIAGNOSIS — R10.811 RIGHT UPPER QUADRANT ABDOMINAL TENDERNESS WITHOUT REBOUND TENDERNESS: ICD-10-CM

## 2018-07-18 DIAGNOSIS — R11.0 NAUSEA: ICD-10-CM

## 2018-07-18 PROCEDURE — 76705 ECHO EXAM OF ABDOMEN: CPT | Mod: TC,PO

## 2018-07-18 PROCEDURE — 76705 ECHO EXAM OF ABDOMEN: CPT | Mod: 26,,, | Performed by: RADIOLOGY

## 2018-07-18 NOTE — PROGRESS NOTES
Please call the patient and let her know that her US is normal. The gallbladder, liver, and pancreas look normal. The pain may be musculoskeletal and she can try to use ibuprofen for pain. If the pain does not improve I can refer her to GI. Thanks.

## 2018-08-01 ENCOUNTER — OFFICE VISIT (OUTPATIENT)
Dept: GASTROENTEROLOGY | Facility: CLINIC | Age: 28
End: 2018-08-01
Payer: COMMERCIAL

## 2018-08-01 VITALS
SYSTOLIC BLOOD PRESSURE: 109 MMHG | WEIGHT: 136 LBS | HEART RATE: 89 BPM | RESPIRATION RATE: 18 BRPM | BODY MASS INDEX: 21.35 KG/M2 | DIASTOLIC BLOOD PRESSURE: 71 MMHG | HEIGHT: 67 IN

## 2018-08-01 DIAGNOSIS — Z87.898 HISTORY OF DIARRHEA: ICD-10-CM

## 2018-08-01 DIAGNOSIS — R10.11 RIGHT UPPER QUADRANT ABDOMINAL PAIN: Primary | ICD-10-CM

## 2018-08-01 DIAGNOSIS — R11.0 NAUSEA: ICD-10-CM

## 2018-08-01 DIAGNOSIS — Z87.19 HISTORY OF IBS: ICD-10-CM

## 2018-08-01 PROCEDURE — 99243 OFF/OP CNSLTJ NEW/EST LOW 30: CPT | Mod: S$GLB,,, | Performed by: NURSE PRACTITIONER

## 2018-08-01 PROCEDURE — 99999 PR PBB SHADOW E&M-EST. PATIENT-LVL IV: CPT | Mod: PBBFAC,,, | Performed by: NURSE PRACTITIONER

## 2018-08-01 RX ORDER — DIPHENHYDRAMINE HCL 25 MG
25 CAPSULE ORAL NIGHTLY PRN
Status: ON HOLD | COMMUNITY
End: 2019-06-13 | Stop reason: HOSPADM

## 2018-08-01 RX ORDER — OMEPRAZOLE 40 MG/1
40 CAPSULE, DELAYED RELEASE ORAL
Qty: 30 CAPSULE | Refills: 3 | Status: SHIPPED | OUTPATIENT
Start: 2018-08-01 | End: 2018-12-03

## 2018-08-01 RX ORDER — ECONAZOLE NITRATE 10 MG/G
CREAM TOPICAL
Status: ON HOLD | COMMUNITY
End: 2019-06-13 | Stop reason: HOSPADM

## 2018-08-01 NOTE — PATIENT INSTRUCTIONS
Abdominal Pain    Abdominal pain is pain in the stomach or belly area. Everyone has this pain from time to time. In many cases it goes away on its own. But abdominal pain can sometimes be due to a serious problem, such as appendicitis. So its important to know when to seek help.  Causes of abdominal pain  There are many possible causes of abdominal pain. Common causes in adults include:  · Constipation, diarrhea, or gas  · Stomach acid flowing back up into the esophagus (acid reflux or heartburn)  · Severe acid reflux, called GERD (gastroesophageal reflux disease)  · A sore in the lining of the stomach or small intestine (peptic ulcer)  · Inflammation of the gallbladder, liver, or pancreas  · Gallstones or kidney stones  · Appendicitis   · Intestinal blockage   · An internal organ pushing through a muscle or other tissue (hernia)  · Urinary tract infections  · In women, menstrual cramps, fibroids, or endometriosis  · Inflammation or infection of the intestines  Diagnosing the cause of abdominal pain  Your healthcare provider will do a physical exam help find the cause of your pain. If needed, tests will be ordered. Belly pain has many possible causes. So it can be hard to find the reason for your pain. Giving details about your pain can help. Tell your provider where and when you feel the pain, and what makes it better or worse. Also let your provider know if you have other symptoms such as:  · Fever  · Tiredness  · Upset stomach (nausea)  · Vomiting  · Changes in bathroom habits  Treating abdominal pain  Some causes of pain need emergency medical treatment right away. These include appendicitis or a bowel blockage. Other problems can be treated with rest, fluids, or medicines. Your healthcare provider can give you specific instructions for treatment or self-care based on what is causing your pain.  If you have vomiting or diarrhea, sip water or other clear fluids. When you are ready to eat solid foods again,  start with small amounts of easy-to-digest, low-fat foods. These include apple sauce, toast, or crackers.   When to seek medical care  Call 911 or go to the hospital right away if you:  · Cant pass stool and are vomiting  · Are vomiting blood or have bloody diarrhea or black, tarry diarrhea  · Have chest, neck, or shoulder pain  · Feel like you might pass out  · Have pain in your shoulder blades with nausea  · Have sudden, severe belly pain  · Have new, severe pain unlike any you have felt before  · Have a belly that is rigid, hard, and tender to touch  Call your healthcare provider if you have:  · Pain for more than 5 days  · Bloating for more than 2 days  · Diarrhea for more than 5 days  · A fever of 100.4°F (38.0°C) or higher, or as directed by your provider  · Pain that gets worse  · Weight loss for no reason  · Continued lack of appetite  · Blood in your stool  How to prevent abdominal pain  Here are some tips to help prevent abdominal pain:  · Eat smaller amounts of food at one time.  · Avoid greasy, fried, or other high-fat foods.  · Avoid foods that give you gas.  · Exercise regularly.  · Drink plenty of fluids.  To help prevent GERD symptoms:  · Quit smoking.  · Reduce alcohol and certain foods that increase stomach acid.  · Avoid aspirin and over-the-counter pain and fever medicines (NSAIDS or nonsteroidal anti-inflammatory drugs), if possible  · Lose extra weight.  · Finish eating at least 2 hours before you go to bed or lie down.  · Raise the head of your bed.  Date Last Reviewed: 7/1/2016  © 6708-4930 [employer]. [address] [place] [address], [place] [address]. All rights reserved. This information is not intended as a substitute for professional medical care. Always follow your healthcare professional's instructions.

## 2018-08-01 NOTE — PROGRESS NOTES
"Subjective:       Patient ID: Eve Kuhn is a 28 y.o. female Body mass index is 21.3 kg/m².    Chief Complaint: Abdominal Pain (RUQ)    This patient is new to me.  Referring Provider:  Dr. Rivera    Abdominal Pain   This is a recurrent problem. Episode onset: started 7/15/18; woke up to check on her son at 4:30am, cramping pain, diarrhea and vomiting. The problem occurs 2 to 4 times per day (typically 30 minutes after eating something). The problem has been gradually improving. The pain is located in the RUQ. The quality of the pain is cramping and sharp ("stinging" "twisting"). Associated symptoms include belching (increased), diarrhea (improving getting back to her normal (IBS-D); bowel movements 2-3 times a day of formed stool), flatus (increased), nausea and vomiting (only once of yellow liquid; denies bright red blood or coffee ground emesis). Pertinent negatives include no anorexia, constipation, dysuria, fever, hematochezia, melena or weight loss. The pain is aggravated by eating. Prior diagnostic workup includes ultrasound. Her past medical history is significant for irritable bowel syndrome. There is no history of Crohn's disease, gallstones, GERD, pancreatitis or ulcerative colitis.     Review of Systems   Constitutional: Negative for appetite change, chills, fatigue, fever, unexpected weight change and weight loss.   HENT: Negative for sore throat and trouble swallowing.    Respiratory: Negative for cough, choking and shortness of breath.    Cardiovascular: Negative for chest pain.   Gastrointestinal: Positive for abdominal pain, diarrhea (improving getting back to her normal (IBS-D); bowel movements 2-3 times a day of formed stool), flatus (increased), nausea and vomiting (only once of yellow liquid; denies bright red blood or coffee ground emesis). Negative for anal bleeding, anorexia, blood in stool, constipation, hematochezia, melena and rectal pain.   Genitourinary: Negative for " difficulty urinating, dysuria and flank pain.   Neurological: Negative for weakness.       Past Medical History:   Diagnosis Date    Abnormal Pap smear     Androgenic alopecia     Chronic rhinitis     Hair loss 2017    Headache(784.0)     IBS (irritable bowel syndrome)      Past Surgical History:   Procedure Laterality Date     SECTION      COLONOSCOPY      Dr. Tyson: normal findings per patient report    EXTERNAL EAR SURGERY      for cauliflower ear    Reconstructive ankly surgery      TONSILLECTOMY, ADENOIDECTOMY      UPPER GASTROINTESTINAL ENDOSCOPY      Dr. Tyson: normal findings per patient report     Family History   Problem Relation Age of Onset    Migraines Mother     Allergies Mother     Allergic rhinitis Mother     Heart disease Father     Hyperlipidemia Father     Migraines Father     Cancer Father     Allergies Father     Allergic rhinitis Father     Migraines Sister     Heart disease Maternal Grandmother     Allergies Maternal Grandmother     Allergic rhinitis Maternal Grandmother     Heart disease Maternal Grandfather     Allergies Maternal Grandfather     Allergic rhinitis Maternal Grandfather     Allergies Paternal Grandmother     Allergic rhinitis Paternal Grandmother     Allergies Paternal Grandfather     Allergic rhinitis Paternal Grandfather     Colon polyps Other     Angioedema Neg Hx     Asthma Neg Hx     Atopy Neg Hx     Eczema Neg Hx     Immunodeficiency Neg Hx     Rhinitis Neg Hx     Urticaria Neg Hx     Colon cancer Neg Hx     Crohn's disease Neg Hx     Stomach cancer Neg Hx     Ulcerative colitis Neg Hx     Esophageal cancer Neg Hx      Wt Readings from Last 10 Encounters:   18 61.7 kg (136 lb)   18 62.5 kg (137 lb 14.4 oz)   18 62.7 kg (138 lb 3.7 oz)   18 60.8 kg (134 lb 0.6 oz)   18 62.3 kg (137 lb 5.6 oz)   18 60.6 kg (133 lb 9.6 oz)   01/10/18 60.6 kg (133 lb 9.6 oz)   17 59.9  kg (132 lb 0.9 oz)   09/20/17 61.1 kg (134 lb 11.2 oz)   09/07/17 61.3 kg (135 lb 2.3 oz)     Lab Results   Component Value Date    WBC 8.21 07/17/2018    HGB 13.6 07/17/2018    HCT 41.7 07/17/2018    MCV 87 07/17/2018     07/17/2018     CMP  Sodium   Date Value Ref Range Status   07/17/2018 144 136 - 145 mmol/L Final     Potassium   Date Value Ref Range Status   07/17/2018 4.3 3.5 - 5.1 mmol/L Final     Chloride   Date Value Ref Range Status   07/17/2018 108 95 - 110 mmol/L Final     CO2   Date Value Ref Range Status   07/17/2018 29 23 - 29 mmol/L Final     Glucose   Date Value Ref Range Status   07/17/2018 93 70 - 110 mg/dL Final     BUN, Bld   Date Value Ref Range Status   07/17/2018 9 6 - 20 mg/dL Final     Creatinine   Date Value Ref Range Status   07/17/2018 0.8 0.5 - 1.4 mg/dL Final     Calcium   Date Value Ref Range Status   07/17/2018 9.4 8.7 - 10.5 mg/dL Final     Total Protein   Date Value Ref Range Status   07/17/2018 7.5 6.0 - 8.4 g/dL Final     Albumin   Date Value Ref Range Status   07/17/2018 4.3 3.5 - 5.2 g/dL Final     Total Bilirubin   Date Value Ref Range Status   07/17/2018 0.3 0.1 - 1.0 mg/dL Final     Comment:     For infants and newborns, interpretation of results should be based  on gestational age, weight and in agreement with clinical  observations.  Premature Infant recommended reference ranges:  Up to 24 hours.............<8.0 mg/dL  Up to 48 hours............<12.0 mg/dL  3-5 days..................<15.0 mg/dL  6-29 days.................<15.0 mg/dL       Alkaline Phosphatase   Date Value Ref Range Status   07/17/2018 57 55 - 135 U/L Final     AST   Date Value Ref Range Status   07/17/2018 14 10 - 40 U/L Final     ALT   Date Value Ref Range Status   07/17/2018 10 10 - 44 U/L Final     Anion Gap   Date Value Ref Range Status   07/17/2018 7 (L) 8 - 16 mmol/L Final     eGFR if    Date Value Ref Range Status   07/17/2018 >60 >60 mL/min/1.73 m^2 Final     eGFR if non     Date Value Ref Range Status   07/17/2018 >60 >60 mL/min/1.73 m^2 Final     Comment:     Calculation used to obtain the estimated glomerular filtration  rate (eGFR) is the CKD-EPI equation.        Lab Results   Component Value Date    TSH 0.642 03/09/2018     Reviewed prior medical records including radiology report of 7/18/18 limited abdominal ultrasound.    Objective:      Physical Exam   Constitutional: She is oriented to person, place, and time. She appears well-developed and well-nourished. No distress.   HENT:   Mouth/Throat: Oropharynx is clear and moist and mucous membranes are normal. No oral lesions. No oropharyngeal exudate.   Eyes: Conjunctivae are normal. Pupils are equal, round, and reactive to light. No scleral icterus.   Cardiovascular: Normal rate.    Pulmonary/Chest: Effort normal and breath sounds normal. No respiratory distress. She has no wheezes.   Abdominal: Soft. Normal appearance and bowel sounds are normal. She exhibits no distension, no abdominal bruit and no mass. There is no hepatosplenomegaly. There is tenderness (mild) in the right upper quadrant. There is no rigidity, no rebound, no guarding, no tenderness at McBurney's point and negative Andrea's sign. No hernia.   Neurological: She is alert and oriented to person, place, and time.   Skin: Skin is warm and dry. No rash noted. She is not diaphoretic. No erythema. No pallor.   Non-jaundiced   Psychiatric: She has a normal mood and affect. Her behavior is normal. Judgment and thought content normal.   Nursing note and vitals reviewed.      Assessment:       1. Right upper quadrant abdominal pain    2. Nausea    3. History of IBS    4. History of diarrhea        Plan:     Patient agreed to sign medical records release consent for us to obtain records from Dr. Tyson's office (to include prior endoscopies)    Right upper quadrant abdominal pain  -     NM Hepatobiliary (HIDA) W Pharm and [facility]jec; Future; Expected date:  08/01/2018  -     Pregnancy, urine rapid; Future; Expected date: 08/01/2018  -  START   omeprazole (PRILOSEC) 40 MG capsule; Take 1 capsule (40 mg total) by mouth before breakfast.  Dispense: 30 capsule; Refill: 3, - take in the morning 30-60 minutes before breakfast, discussed about possible long term use of medication (prefer to use lowest effective dose or discontinuing if possible), pt verbalized understanding  -Educated patient on lifestyle modifications to help control/reduce reflux/abdominal pain including: avoid large meals, avoid eating within 2-3 hours of bedtime (avoid late night eating & lying down soon after eating), elevate head of bed if nocturnal symptoms are present, smoking cessation (if current smoker), & weight loss (if overweight).   -Educated to avoid known foods which trigger reflux symptoms & to minimize/avoid high-fat foods, chocolate, caffeine, citrus, alcohol, & tomato products.  -Advised to avoid/limit use of NSAID's, since they can cause GI upset, bleeding, and/or ulcers. If needed, take with food.   - schedule EGD, discussed procedure with patient, patient verbalized understanding    Nausea  -  START   omeprazole (PRILOSEC) 40 MG capsule; Take 1 capsule (40 mg total) by mouth before breakfast.  Dispense: 30 capsule; Refill: 3  - schedule EGD, discussed procedure with patient, patient verbalized understanding    History of IBS & diarrhea  - recommended OTC probiotic, such as Align or Culturelle, as directed  - avoid lactose  - drink adequate water intake  -smaller, more frequent meals  -avoid trigger foods  - recommended increase fiber in diet, especially soluble fiber since this can help bulk up the stool consistency and may help to slow down how fast the stool goes through the colon and can prevent diarrhea    Follow-up in about 1 month (around 9/1/2018), or if symptoms worsen or fail to improve.      If no improvement in symptoms or symptoms worsen, call/follow-up at clinic or go to  [facility]

## 2018-08-01 NOTE — LETTER
August 1, 2018      Cleve Rivera MD  [address] [facility] [address] [place] [address]           [facility]  [address] [facility] [address] [place] [address]  Phone: [phone]          Patient: Eve Kuhn   MR Number: [id number]   YOB: 1990   Date of Visit: 8/1/2018       Dear Dr. Cleve Rivera:    Thank you for referring Eve Kuhn to me for evaluation. Attached you will find relevant portions of my assessment and plan of care.    If you have questions, please do not hesitate to call me. I look forward to following Eve Kuhn along with you.    Sincerely,    Lizett Ruano, [facility]    Enclosure  CC:  No Recipients    If you would like to receive this communication electronically, please contact [email or web address] or [phone] to request more information on [employer] access.    For providers and/or their staff who would like to refer a patient to [facility], please contact us through our one-stop-shop provider referral line, [facility], at [phone].    If you feel you have received this communication in error or would no longer like to receive these types of communications, please e-mail [email or web address]

## 2018-08-14 ENCOUNTER — TELEPHONE (OUTPATIENT)
Dept: GASTROENTEROLOGY | Facility: CLINIC | Age: 28
End: 2018-08-14

## 2018-08-14 NOTE — TELEPHONE ENCOUNTER
----- Message from Nina Finley sent at 8/14/2018  1:08 PM CDT -----  Contact: Patient   Type: Needs Medical Advice    Who Called:  Patient  Symptoms (please be specific):  na  How long has patient had these symptoms:  geovany  Pharmacy name and phone #:  [email or web address]  Best Call Back Number:   Additional Information: Calling to reschedule her upcoming procedure on 8/16/18. Please advise.

## 2018-08-19 ENCOUNTER — PATIENT MESSAGE (OUTPATIENT)
Dept: NEUROLOGY | Facility: CLINIC | Age: 28
End: 2018-08-19

## 2018-08-20 RX ORDER — RIZATRIPTAN BENZOATE 10 MG/1
TABLET ORAL
Qty: 9 TABLET | Refills: 3 | Status: SHIPPED | OUTPATIENT
Start: 2018-08-20 | End: 2019-08-23

## 2018-09-24 ENCOUNTER — OFFICE VISIT (OUTPATIENT)
Dept: FAMILY MEDICINE | Facility: CLINIC | Age: 28
End: 2018-09-24
Payer: COMMERCIAL

## 2018-09-24 VITALS
HEART RATE: 88 BPM | WEIGHT: 142.44 LBS | SYSTOLIC BLOOD PRESSURE: 116 MMHG | TEMPERATURE: 98 F | DIASTOLIC BLOOD PRESSURE: 66 MMHG | RESPIRATION RATE: 18 BRPM | HEIGHT: 67 IN | OXYGEN SATURATION: 97 % | BODY MASS INDEX: 22.36 KG/M2

## 2018-09-24 DIAGNOSIS — R05.9 COUGH: Primary | ICD-10-CM

## 2018-09-24 DIAGNOSIS — J01.11 ACUTE RECURRENT FRONTAL SINUSITIS: ICD-10-CM

## 2018-09-24 PROCEDURE — 99999 PR PBB SHADOW E&M-EST. PATIENT-LVL IV: CPT | Mod: PBBFAC,,, | Performed by: NURSE PRACTITIONER

## 2018-09-24 PROCEDURE — 99214 OFFICE O/P EST MOD 30 MIN: CPT | Mod: S$GLB,,, | Performed by: NURSE PRACTITIONER

## 2018-09-24 RX ORDER — FLUTICASONE PROPIONATE 50 MCG
1 SPRAY, SUSPENSION (ML) NASAL DAILY
Qty: 1 BOTTLE | Refills: 0 | Status: ON HOLD | OUTPATIENT
Start: 2018-09-24 | End: 2019-06-13 | Stop reason: HOSPADM

## 2018-09-24 RX ORDER — CEFDINIR 300 MG/1
300 CAPSULE ORAL 2 TIMES DAILY
Qty: 20 CAPSULE | Refills: 0 | Status: SHIPPED | OUTPATIENT
Start: 2018-09-24 | End: 2018-10-04

## 2018-09-24 NOTE — PROGRESS NOTES
Subjective:       Patient ID: Eve Kuhn is a 28 y.o. female.    Chief Complaint: Sinus Problem (started 3 wks ago, cough feels like she is choking, scant production, had low temp  one day, sharp pains across chest at times,) and Otalgia (possible fluid)    Cough   This is a new problem. The current episode started 1 to 4 weeks ago. The problem has been gradually worsening. The problem occurs every few hours. The cough is productive of sputum. Associated symptoms include chest pain, ear congestion, ear pain, a fever, nasal congestion, postnasal drip, rhinorrhea and wheezing. Pertinent negatives include no chills, headaches, heartburn, hemoptysis, myalgias, rash, sore throat, shortness of breath, sweats or weight loss. Associated symptoms comments: Left ear . Treatments tried: advil, benadryl, sudafed, afrin      Vitals:    09/24/18 0920   BP: 116/66   Pulse: 88   Resp: 18   Temp: 98.3 °F (36.8 °C)     Review of Systems   Constitutional: Positive for activity change and fever. Negative for chills, diaphoresis, fatigue, unexpected weight change and weight loss.   HENT: Positive for ear pain, postnasal drip and rhinorrhea. Negative for hearing loss, sore throat and trouble swallowing.    Eyes: Negative.  Negative for discharge and visual disturbance.   Respiratory: Positive for cough, chest tightness and wheezing. Negative for hemoptysis and shortness of breath.    Cardiovascular: Positive for chest pain. Negative for palpitations.   Gastrointestinal: Negative.  Negative for abdominal pain, blood in stool, constipation, diarrhea, heartburn, nausea and vomiting.   Endocrine: Negative.  Negative for polydipsia and polyuria.   Genitourinary: Negative.  Negative for difficulty urinating, dysuria, hematuria and menstrual problem.   Musculoskeletal: Positive for neck pain. Negative for arthralgias, joint swelling and myalgias.   Skin: Negative.  Negative for color change and rash.   Allergic/Immunologic:  Negative.    Neurological: Positive for weakness. Negative for speech difficulty, numbness and headaches.   Hematological: Negative.    Psychiatric/Behavioral: Negative.  Negative for confusion and dysphoric mood.       Past Medical History:   Diagnosis Date    Abnormal Pap smear     Androgenic alopecia     Chronic rhinitis     Hair loss 1/30/2017    Headache(784.0)     IBS (irritable bowel syndrome)      Objective:      Physical Exam   Constitutional: She is oriented to person, place, and time. She appears well-developed and well-nourished.   HENT:   Head: Normocephalic and atraumatic.   Right Ear: Hearing, tympanic membrane and ear canal normal. No drainage. No decreased hearing is noted.   Left Ear: Hearing normal. No drainage. A middle ear effusion is present. No decreased hearing is noted.   Nose: Right sinus exhibits frontal sinus tenderness. Right sinus exhibits no maxillary sinus tenderness. Left sinus exhibits frontal sinus tenderness. Left sinus exhibits no maxillary sinus tenderness.   Mouth/Throat: Uvula is midline, oropharynx is clear and moist and mucous membranes are normal.   Eyes: Conjunctivae and EOM are normal. Pupils are equal, round, and reactive to light.   Neck: Neck supple.   Cardiovascular: Normal rate, regular rhythm, normal heart sounds and intact distal pulses. Exam reveals no friction rub.   No murmur heard.  Pulmonary/Chest: Effort normal and breath sounds normal. No stridor. No respiratory distress. She has no wheezes.   Abdominal: Soft. Bowel sounds are normal.   Musculoskeletal: Normal range of motion.   Lymphadenopathy:        Head (right side): No submandibular, no tonsillar, no preauricular and no posterior auricular adenopathy present.        Head (left side): No submandibular, no tonsillar, no preauricular and no posterior auricular adenopathy present.   Neurological: She is alert and oriented to person, place, and time.   Skin: Skin is warm and dry.   Psychiatric: She has  a normal mood and affect. Her behavior is normal. Judgment and thought content normal.   Nursing note and vitals reviewed.      Assessment:       1. Cough    2. Acute recurrent frontal sinusitis        Plan:       Cough  -     cefdinir (OMNICEF) 300 MG capsule; Take 1 capsule (300 mg total) by mouth 2 (two) times daily. for 10 days  Dispense: 20 capsule; Refill: 0    Acute recurrent frontal sinusitis  -     cefdinir (OMNICEF) 300 MG capsule; Take 1 capsule (300 mg total) by mouth 2 (two) times daily. for 10 days  Dispense: 20 capsule; Refill: 0    -     fluticasone (FLONASE) 50 mcg/actuation nasal spray; 1 spray (50 mcg total) by Each Nare route once daily.  Dispense: 1 Bottle; Refill: 0          Stay on OTc allergy meds also and add flonase   Call if not better

## 2018-09-26 ENCOUNTER — PATIENT MESSAGE (OUTPATIENT)
Dept: ALLERGY | Facility: CLINIC | Age: 28
End: 2018-09-26

## 2018-09-26 DIAGNOSIS — J32.9 RECURRENT SINUS INFECTIONS: Primary | ICD-10-CM

## 2018-09-27 ENCOUNTER — LAB VISIT (OUTPATIENT)
Dept: LAB | Facility: HOSPITAL | Age: 28
End: 2018-09-27
Attending: ALLERGY & IMMUNOLOGY
Payer: COMMERCIAL

## 2018-09-27 DIAGNOSIS — J32.9 RECURRENT SINUS INFECTIONS: ICD-10-CM

## 2018-09-27 LAB
IGA SERPL-MCNC: 203 MG/DL
IGG SERPL-MCNC: 1141 MG/DL
IGM SERPL-MCNC: 142 MG/DL

## 2018-09-27 PROCEDURE — 82787 IGG 1 2 3 OR 4 EACH: CPT | Mod: 59

## 2018-09-27 PROCEDURE — 36415 COLL VENOUS BLD VENIPUNCTURE: CPT | Mod: PO

## 2018-09-27 PROCEDURE — 82784 ASSAY IGA/IGD/IGG/IGM EACH: CPT | Mod: 59

## 2018-09-27 PROCEDURE — 86774 TETANUS ANTIBODY: CPT

## 2018-09-27 PROCEDURE — 82784 ASSAY IGA/IGD/IGG/IGM EACH: CPT

## 2018-10-01 LAB
IGG1 SER-MCNC: 540 MG/DL
IGG2 SER-MCNC: 377 MG/DL
IGG3 SER-MCNC: 193 MG/DL
IGG4 SER-MCNC: 37 MG/DL

## 2018-10-03 ENCOUNTER — PATIENT MESSAGE (OUTPATIENT)
Dept: ALLERGY | Facility: CLINIC | Age: 28
End: 2018-10-03

## 2018-10-03 LAB
C DIPHTHERIAE AB SER IA-ACNC: >2 IU/ML
C TETANI AB SER-ACNC: 5.9 IU/ML
DEPRECATED S PNEUM 1 IGG SER-MCNC: 5.8 MCG/ML
DEPRECATED S PNEUM12 IGG SER-MCNC: <0.3 MCG/ML
DEPRECATED S PNEUM14 IGG SER-MCNC: 0.9 MCG/ML
DEPRECATED S PNEUM19 IGG SER-MCNC: 20 MCG/ML
DEPRECATED S PNEUM23 IGG SER-MCNC: 10.6 MCG/ML
DEPRECATED S PNEUM3 IGG SER-MCNC: 7.4 MCG/ML
DEPRECATED S PNEUM4 IGG SER-MCNC: 3.7 MCG/ML
DEPRECATED S PNEUM5 IGG SER-MCNC: 6.7 MCG/ML
DEPRECATED S PNEUM8 IGG SER-MCNC: <0.3 MCG/ML
DEPRECATED S PNEUM9 IGG SER-MCNC: <0.3 MCG/ML
HAEM INFLU B IGG SER-MCNC: 0.94 MG/L
S PNEUM DA 18C IGG SER-MCNC: 2.1 MCG/ML
S PNEUM DA 6B IGG SER-MCNC: 16.7 MCG/ML
S PNEUM DA 7F IGG SER-MCNC: 3.8 MCG/ML
S PNEUM DA 9V IGG SER-MCNC: 1.2 MCG/ML

## 2018-12-03 ENCOUNTER — OFFICE VISIT (OUTPATIENT)
Dept: FAMILY MEDICINE | Facility: CLINIC | Age: 28
End: 2018-12-03
Payer: COMMERCIAL

## 2018-12-03 VITALS
WEIGHT: 137.38 LBS | HEIGHT: 67 IN | DIASTOLIC BLOOD PRESSURE: 70 MMHG | SYSTOLIC BLOOD PRESSURE: 108 MMHG | BODY MASS INDEX: 21.56 KG/M2 | TEMPERATURE: 99 F | HEART RATE: 83 BPM | OXYGEN SATURATION: 99 %

## 2018-12-03 DIAGNOSIS — Z3A.11 11 WEEKS GESTATION OF PREGNANCY: ICD-10-CM

## 2018-12-03 DIAGNOSIS — R11.2 NAUSEA AND VOMITING, INTRACTABILITY OF VOMITING NOT SPECIFIED, UNSPECIFIED VOMITING TYPE: ICD-10-CM

## 2018-12-03 DIAGNOSIS — E86.0 DEHYDRATION: Primary | ICD-10-CM

## 2018-12-03 DIAGNOSIS — R11.10 HYPEREMESIS: ICD-10-CM

## 2018-12-03 PROCEDURE — 99214 OFFICE O/P EST MOD 30 MIN: CPT | Mod: 25,S$GLB,, | Performed by: NURSE PRACTITIONER

## 2018-12-03 PROCEDURE — 99999 PR PBB SHADOW E&M-EST. PATIENT-LVL III: CPT | Mod: PBBFAC,,, | Performed by: NURSE PRACTITIONER

## 2018-12-03 PROCEDURE — 96372 THER/PROPH/DIAG INJ SC/IM: CPT | Mod: S$GLB,,, | Performed by: NURSE PRACTITIONER

## 2018-12-03 PROCEDURE — 96360 HYDRATION IV INFUSION INIT: CPT | Mod: S$GLB,,, | Performed by: NURSE PRACTITIONER

## 2018-12-03 RX ORDER — SODIUM CHLORIDE 9 MG/ML
INJECTION, SOLUTION INTRAVENOUS ONCE
Status: COMPLETED | OUTPATIENT
Start: 2018-12-03 | End: 2018-12-03

## 2018-12-03 RX ORDER — ONDANSETRON 2 MG/ML
4 INJECTION INTRAMUSCULAR; INTRAVENOUS
Status: COMPLETED | OUTPATIENT
Start: 2018-12-03 | End: 2018-12-03

## 2018-12-03 RX ORDER — ONDANSETRON 4 MG/1
4 TABLET, ORALLY DISINTEGRATING ORAL EVERY 8 HOURS PRN
Qty: 20 TABLET | Refills: 0 | Status: SHIPPED | OUTPATIENT
Start: 2018-12-03 | End: 2019-03-11

## 2018-12-03 RX ORDER — ONDANSETRON 4 MG/1
8 TABLET, FILM COATED ORAL 2 TIMES DAILY
COMMUNITY
End: 2019-03-11

## 2018-12-03 RX ORDER — SODIUM CHLORIDE 9 MG/ML
INJECTION, SOLUTION INTRAVENOUS
Status: COMPLETED | OUTPATIENT
Start: 2018-12-03 | End: 2018-12-03

## 2018-12-03 RX ORDER — PROMETHAZINE HYDROCHLORIDE 12.5 MG/1
12.5 TABLET ORAL 4 TIMES DAILY
Qty: 20 TABLET | Refills: 0 | Status: SHIPPED | OUTPATIENT
Start: 2018-12-03 | End: 2018-12-31

## 2018-12-03 RX ADMIN — ONDANSETRON 4 MG: 2 INJECTION INTRAMUSCULAR; INTRAVENOUS at 01:12

## 2018-12-03 RX ADMIN — SODIUM CHLORIDE: 9 INJECTION, SOLUTION INTRAVENOUS at 01:12

## 2018-12-03 RX ADMIN — SODIUM CHLORIDE: 9 INJECTION, SOLUTION INTRAVENOUS at 12:12

## 2018-12-03 RX ADMIN — ONDANSETRON 4 MG: 2 INJECTION INTRAMUSCULAR; INTRAVENOUS at 12:12

## 2018-12-03 NOTE — PROGRESS NOTES
Subjective:       Patient ID: Eve Kuhn is a 28 y.o. female.    Chief Complaint: Fever (Patient is 11 weeks pregnant); Generalized Body Aches; Chills; Nausea; and Emesis    Here today with vomiting  X 5 days   11 weeks pregnant - dr andrade - last appt   Taking zofran - but not helping     Lost 7 lbs this past 2 wks - not eating well   Last void 12 mid        Emesis    This is a new problem. The current episode started in the past 7 days (5 days ). The problem occurs 5 to 10 times per day. The problem has been gradually worsening. The emesis has an appearance of stomach contents. Maximum temperature: low grade  Associated symptoms include chills, a fever, headaches and myalgias. Pertinent negatives include no abdominal pain, arthralgias, chest pain, coughing, diarrhea, dizziness, sweats, URI or weight loss. Associated symptoms comments: Nasal congestion . She has tried acetaminophen and increased fluids for the symptoms.     Vitals:    12/03/18 1145   BP: 108/70   Pulse: 83   Temp: 98.6 °F (37 °C)     Review of Systems   Constitutional: Positive for chills and fever. Negative for activity change, diaphoresis, fatigue, unexpected weight change and weight loss.   HENT: Negative.  Negative for hearing loss, rhinorrhea and trouble swallowing.    Eyes: Negative.  Negative for discharge and visual disturbance.   Respiratory: Negative.  Negative for cough, chest tightness, shortness of breath and wheezing.    Cardiovascular: Positive for palpitations. Negative for chest pain.   Gastrointestinal: Positive for vomiting. Negative for abdominal pain, blood in stool, constipation, diarrhea and nausea.   Endocrine: Negative.  Negative for polydipsia and polyuria.   Genitourinary: Negative.  Negative for difficulty urinating, dysuria and hematuria.   Musculoskeletal: Positive for myalgias and neck pain. Negative for arthralgias and joint swelling.   Skin: Negative for color change and rash.   Allergic/Immunologic:  Negative.    Neurological: Positive for weakness and headaches. Negative for dizziness, speech difficulty and numbness.   Hematological: Negative.    Psychiatric/Behavioral: Negative.  Negative for confusion and dysphoric mood.       Past Medical History:   Diagnosis Date    Abnormal Pap smear     Androgenic alopecia     Chronic rhinitis     Hair loss 1/30/2017    Headache(784.0)     IBS (irritable bowel syndrome)      Objective:      Physical Exam   Constitutional: She is oriented to person, place, and time. She appears well-developed and well-nourished. She appears ill.   HENT:   Head: Normocephalic and atraumatic.   Mouth/Throat: Oropharynx is clear and moist.   Eyes: Conjunctivae and EOM are normal. Pupils are equal, round, and reactive to light.   Neck: Normal range of motion. Neck supple.   Cardiovascular: Normal rate, regular rhythm, normal heart sounds and intact distal pulses. Exam reveals no friction rub.   No murmur heard.  Pulmonary/Chest: Effort normal and breath sounds normal.   Abdominal: Soft. Bowel sounds are normal. She exhibits no distension. There is no tenderness. There is no guarding.   Musculoskeletal: Normal range of motion.   Neurological: She is alert and oriented to person, place, and time.   Skin: Skin is warm and dry. There is pallor.   Psychiatric: She has a normal mood and affect. Her behavior is normal. Judgment normal.   Nursing note and vitals reviewed.      Assessment:       1. Dehydration    2. Nausea and vomiting, intractability of vomiting not specified, unspecified vomiting type    3. 11 weeks gestation of pregnancy    4. Hyperemesis        Plan:       Dehydration  -     0.9%  NaCl infusion  -     ondansetron injection 4 mg  -     0.9%  NaCl infusion  -     ondansetron injection 4 mg    Nausea and vomiting, intractability of vomiting not specified, unspecified vomiting type  -     0.9%  NaCl infusion  -     ondansetron injection 4 mg  -     0.9%  NaCl infusion  -      ondansetron injection 4 mg  -     ondansetron (ZOFRAN-ODT) 4 MG TbDL; Take 1 tablet (4 mg total) by mouth every 8 (eight) hours as needed.  Dispense: 20 tablet; Refill: 0  -     promethazine (PHENERGAN) 12.5 MG Tab; Take 1 tablet (12.5 mg total) by mouth 4 (four) times daily.  Dispense: 20 tablet; Refill: 0    11 weeks gestation of pregnancy    Hyperemesis            She spoke to her OB GYN and she would like for us to alternate phenergan and zofran   She will see her GYN this week     Fu if not better

## 2019-03-11 ENCOUNTER — OFFICE VISIT (OUTPATIENT)
Dept: FAMILY MEDICINE | Facility: CLINIC | Age: 29
End: 2019-03-11
Payer: COMMERCIAL

## 2019-03-11 VITALS
TEMPERATURE: 98 F | HEIGHT: 67 IN | HEART RATE: 98 BPM | OXYGEN SATURATION: 98 % | BODY MASS INDEX: 23.15 KG/M2 | WEIGHT: 147.5 LBS | SYSTOLIC BLOOD PRESSURE: 106 MMHG | DIASTOLIC BLOOD PRESSURE: 64 MMHG

## 2019-03-11 DIAGNOSIS — J01.00 ACUTE NON-RECURRENT MAXILLARY SINUSITIS: Primary | ICD-10-CM

## 2019-03-11 DIAGNOSIS — R09.81 NASAL CONGESTION: ICD-10-CM

## 2019-03-11 DIAGNOSIS — J34.89 SINUS PRESSURE: ICD-10-CM

## 2019-03-11 DIAGNOSIS — Z3A.24 24 WEEKS GESTATION OF PREGNANCY: ICD-10-CM

## 2019-03-11 PROCEDURE — 99214 OFFICE O/P EST MOD 30 MIN: CPT | Mod: S$GLB,,, | Performed by: NURSE PRACTITIONER

## 2019-03-11 PROCEDURE — 99999 PR PBB SHADOW E&M-EST. PATIENT-LVL IV: CPT | Mod: PBBFAC,,, | Performed by: NURSE PRACTITIONER

## 2019-03-11 PROCEDURE — 99214 PR OFFICE/OUTPT VISIT, EST, LEVL IV, 30-39 MIN: ICD-10-PCS | Mod: S$GLB,,, | Performed by: NURSE PRACTITIONER

## 2019-03-11 PROCEDURE — 99999 PR PBB SHADOW E&M-EST. PATIENT-LVL IV: ICD-10-PCS | Mod: PBBFAC,,, | Performed by: NURSE PRACTITIONER

## 2019-03-11 RX ORDER — AMOXICILLIN 875 MG/1
875 TABLET, FILM COATED ORAL EVERY 12 HOURS
Qty: 14 TABLET | Refills: 0 | Status: SHIPPED | OUTPATIENT
Start: 2019-03-11 | End: 2019-05-23 | Stop reason: ALTCHOICE

## 2019-03-11 NOTE — PROGRESS NOTES
Subjective:       Patient ID: Eve Kuhn is a 28 y.o. female.    Chief Complaint: Nasal Congestion; Headache; Sinus drip; Ear Drainage; and Diarrhea    + 24 weeks pregnant    + viral illnesses with son and herself for months       Sinus Problem   This is a new problem. The current episode started 1 to 4 weeks ago. The problem has been gradually worsening since onset. There has been no fever. Associated symptoms include congestion, coughing, ear pain, sinus pressure and a sore throat. Pertinent negatives include no chills, diaphoresis, headaches, hoarse voice, neck pain, shortness of breath, sneezing or swollen glands. Treatments tried: benadryl, flonase      Vitals:    03/11/19 0833   BP: 106/64   Pulse: 98   Temp: 98.2 °F (36.8 °C)     Review of Systems   Constitutional: Negative for activity change, chills, diaphoresis, fatigue, fever and unexpected weight change.   HENT: Positive for congestion, ear pain, hearing loss, sinus pressure, sore throat and trouble swallowing. Negative for hoarse voice, rhinorrhea and sneezing.    Eyes: Negative.  Negative for discharge and visual disturbance.   Respiratory: Positive for cough and wheezing. Negative for chest tightness and shortness of breath.    Cardiovascular: Negative for chest pain and palpitations.   Gastrointestinal: Positive for diarrhea. Negative for abdominal pain, blood in stool, constipation, nausea and vomiting.   Endocrine: Negative.  Negative for polydipsia and polyuria.   Genitourinary: Negative for difficulty urinating, dysuria, hematuria and menstrual problem.   Musculoskeletal: Negative for arthralgias, joint swelling and neck pain.   Skin: Negative for color change and rash.   Allergic/Immunologic: Negative.    Neurological: Negative for speech difficulty, weakness, numbness and headaches.   Hematological: Negative.    Psychiatric/Behavioral: Negative.  Negative for confusion and dysphoric mood.       Past Medical History:   Diagnosis  Date    Abnormal Pap smear     Androgenic alopecia     Chronic rhinitis     Hair loss 1/30/2017    Headache(784.0)     IBS (irritable bowel syndrome)      Objective:      Physical Exam   Constitutional: She is oriented to person, place, and time. She appears well-developed and well-nourished.   HENT:   Head: Normocephalic and atraumatic.   Right Ear: Hearing, external ear and ear canal normal. A middle ear effusion is present.   Left Ear: Hearing, external ear and ear canal normal. A middle ear effusion is present.   Nose: No mucosal edema, rhinorrhea, nose lacerations or sinus tenderness. Right sinus exhibits maxillary sinus tenderness. Right sinus exhibits no frontal sinus tenderness. Left sinus exhibits maxillary sinus tenderness. Left sinus exhibits no frontal sinus tenderness.   Mouth/Throat: Uvula is midline and mucous membranes are normal. Posterior oropharyngeal erythema present. No oropharyngeal exudate or posterior oropharyngeal edema.   Eyes: Conjunctivae and EOM are normal. Pupils are equal, round, and reactive to light.   Neck: Normal range of motion. Neck supple.   Cardiovascular: Normal rate, regular rhythm, normal heart sounds and intact distal pulses. Exam reveals no friction rub.   No murmur heard.  Pulmonary/Chest: Effort normal and breath sounds normal. No stridor. No respiratory distress. She has no wheezes. She has no rales.   Abdominal: Soft. Bowel sounds are normal.   Musculoskeletal: Normal range of motion. She exhibits no edema or tenderness.   Neurological: She is alert and oriented to person, place, and time. No cranial nerve deficit. Coordination normal.   Skin: Skin is warm and dry.   Psychiatric: She has a normal mood and affect. Her behavior is normal. Judgment and thought content normal.   Nursing note and vitals reviewed.      Assessment:       1. Acute non-recurrent maxillary sinusitis    2. Nasal congestion    3. Sinus pressure    4. 24 weeks gestation of pregnancy         Plan:       Acute non-recurrent maxillary sinusitis  -     amoxicillin (AMOXIL) 875 MG tablet; Take 1 tablet (875 mg total) by mouth every 12 (twelve) hours.  Dispense: 14 tablet; Refill: 0    Nasal congestion  Sinus pressure  24 weeks gestation of pregnancy  Reviewed safe medication list from her GYN - would recommend flonase and mucinex with her complaints       FU with PCP and OB as directed  Call with any issues

## 2019-04-10 PROBLEM — O36.8190 DECREASED FETAL MOVEMENT: Status: ACTIVE | Noted: 2019-04-10

## 2019-04-26 PROBLEM — O26.899 ABDOMINAL CRAMPING AFFECTING PREGNANCY: Status: ACTIVE | Noted: 2019-04-26

## 2019-04-26 PROBLEM — R10.9 ABDOMINAL CRAMPING AFFECTING PREGNANCY: Status: ACTIVE | Noted: 2019-04-26

## 2019-05-07 PROBLEM — O47.9 IRREGULAR UTERINE CONTRACTIONS: Status: ACTIVE | Noted: 2019-05-07

## 2019-05-24 ENCOUNTER — OFFICE VISIT (OUTPATIENT)
Dept: FAMILY MEDICINE | Facility: CLINIC | Age: 29
End: 2019-05-24
Payer: COMMERCIAL

## 2019-05-24 VITALS
RESPIRATION RATE: 18 BRPM | TEMPERATURE: 98 F | BODY MASS INDEX: 25.06 KG/M2 | SYSTOLIC BLOOD PRESSURE: 98 MMHG | OXYGEN SATURATION: 98 % | HEIGHT: 67 IN | HEART RATE: 89 BPM | WEIGHT: 159.63 LBS | DIASTOLIC BLOOD PRESSURE: 62 MMHG

## 2019-05-24 DIAGNOSIS — J32.9 SINUSITIS, UNSPECIFIED CHRONICITY, UNSPECIFIED LOCATION: Primary | ICD-10-CM

## 2019-05-24 DIAGNOSIS — J34.89 RHINORRHEA: ICD-10-CM

## 2019-05-24 DIAGNOSIS — J30.1 ALLERGIC RHINITIS DUE TO POLLEN, UNSPECIFIED SEASONALITY: ICD-10-CM

## 2019-05-24 PROCEDURE — 99214 OFFICE O/P EST MOD 30 MIN: CPT | Mod: S$GLB,,, | Performed by: NURSE PRACTITIONER

## 2019-05-24 PROCEDURE — 99214 PR OFFICE/OUTPT VISIT, EST, LEVL IV, 30-39 MIN: ICD-10-PCS | Mod: S$GLB,,, | Performed by: NURSE PRACTITIONER

## 2019-05-24 PROCEDURE — 99999 PR PBB SHADOW E&M-EST. PATIENT-LVL IV: CPT | Mod: PBBFAC,,, | Performed by: NURSE PRACTITIONER

## 2019-05-24 PROCEDURE — 99999 PR PBB SHADOW E&M-EST. PATIENT-LVL IV: ICD-10-PCS | Mod: PBBFAC,,, | Performed by: NURSE PRACTITIONER

## 2019-05-24 RX ORDER — AMOXICILLIN 875 MG/1
875 TABLET, FILM COATED ORAL EVERY 12 HOURS
Qty: 14 TABLET | Refills: 0 | Status: SHIPPED | OUTPATIENT
Start: 2019-05-24 | End: 2019-06-06 | Stop reason: ALTCHOICE

## 2019-05-24 NOTE — PROGRESS NOTES
Subjective:       Patient ID: Eve Kuhn is a 29 y.o. female.    Chief Complaint: Sinusitis    Sinus Problem   This is a new problem. The current episode started 1 to 4 weeks ago. The problem has been gradually worsening since onset. There has been no fever. Associated symptoms include congestion, ear pain, sinus pressure and a sore throat. Pertinent negatives include no chills, coughing, diaphoresis, headaches, hoarse voice, neck pain, shortness of breath, sneezing or swollen glands. Treatments tried: mucinex, zyrtec.     Vitals:    05/24/19 0941   BP: 98/62   Pulse: 89   Resp: 18   Temp: 98.2 °F (36.8 °C)     Review of Systems   Constitutional: Negative.  Negative for chills, diaphoresis, fatigue and fever.   HENT: Positive for congestion, ear pain, sinus pressure and sore throat. Negative for hoarse voice and sneezing.    Eyes: Negative.    Respiratory: Negative.  Negative for cough, shortness of breath and wheezing.    Cardiovascular: Negative.  Negative for chest pain.   Gastrointestinal: Negative.  Negative for abdominal pain, diarrhea and nausea.   Endocrine: Negative.    Genitourinary: Negative.  Negative for dysuria and hematuria.   Musculoskeletal: Negative.  Negative for neck pain.   Skin: Negative for color change and rash.   Allergic/Immunologic: Negative.    Neurological: Negative.  Negative for speech difficulty, numbness and headaches.   Hematological: Negative.    Psychiatric/Behavioral: Negative.        Past Medical History:   Diagnosis Date    Abnormal Pap smear     Androgenic alopecia     Chronic rhinitis     Hair loss 1/30/2017    Headache(784.0)     IBS (irritable bowel syndrome)        Objective:      Physical Exam   Constitutional: She is oriented to person, place, and time. She appears well-developed and well-nourished.   HENT:   Head: Normocephalic and atraumatic.   Right Ear: Hearing, external ear and ear canal normal. A middle ear effusion is present.   Left Ear:  Hearing, tympanic membrane, external ear and ear canal normal.   Nose: Rhinorrhea present. No mucosal edema or sinus tenderness. Right sinus exhibits no maxillary sinus tenderness and no frontal sinus tenderness. Left sinus exhibits no maxillary sinus tenderness and no frontal sinus tenderness.   Mouth/Throat: Uvula is midline and mucous membranes are normal. Posterior oropharyngeal erythema present. No oropharyngeal exudate or posterior oropharyngeal edema.   Eyes: Pupils are equal, round, and reactive to light. Conjunctivae and EOM are normal.   Neck: Normal range of motion. Neck supple.   Cardiovascular: Normal rate, regular rhythm, normal heart sounds and intact distal pulses. Exam reveals no friction rub.   No murmur heard.  Pulmonary/Chest: Effort normal and breath sounds normal. No stridor. No respiratory distress. She has no wheezes. She has no rales.   Abdominal: Soft. Bowel sounds are normal.   Musculoskeletal: Normal range of motion. She exhibits no edema or tenderness.   Neurological: She is alert and oriented to person, place, and time. No cranial nerve deficit. Coordination normal.   Skin: Skin is warm and dry.   Psychiatric: She has a normal mood and affect. Her behavior is normal. Judgment and thought content normal.   Nursing note and vitals reviewed.      Assessment:       1. Sinusitis, unspecified chronicity, unspecified location    2. Allergic rhinitis due to pollen, unspecified seasonality    3. Rhinorrhea        Plan:       Sinusitis, unspecified chronicity, unspecified location  -     amoxicillin (AMOXIL) 875 MG tablet; Take 1 tablet (875 mg total) by mouth every 12 (twelve) hours.  Dispense: 14 tablet; Refill: 0    Allergic rhinitis due to pollen, unspecified seasonality    Rhinorrhea    add budesonide nose spray - safe for pregnancy   And zyrtec      Use amoxil only if needed for acute infection

## 2019-06-04 PROBLEM — R50.9 FEVER OF UNKNOWN ORIGIN: Status: ACTIVE | Noted: 2019-06-04

## 2019-06-06 ENCOUNTER — OFFICE VISIT (OUTPATIENT)
Dept: FAMILY MEDICINE | Facility: CLINIC | Age: 29
End: 2019-06-06
Payer: COMMERCIAL

## 2019-06-06 ENCOUNTER — PATIENT MESSAGE (OUTPATIENT)
Dept: FAMILY MEDICINE | Facility: CLINIC | Age: 29
End: 2019-06-06

## 2019-06-06 ENCOUNTER — LAB VISIT (OUTPATIENT)
Dept: LAB | Facility: HOSPITAL | Age: 29
End: 2019-06-06
Attending: NURSE PRACTITIONER
Payer: COMMERCIAL

## 2019-06-06 VITALS
WEIGHT: 161.81 LBS | SYSTOLIC BLOOD PRESSURE: 98 MMHG | BODY MASS INDEX: 25.4 KG/M2 | RESPIRATION RATE: 18 BRPM | HEART RATE: 96 BPM | OXYGEN SATURATION: 99 % | TEMPERATURE: 98 F | HEIGHT: 67 IN | DIASTOLIC BLOOD PRESSURE: 62 MMHG

## 2019-06-06 DIAGNOSIS — R50.9 FEVER, UNSPECIFIED FEVER CAUSE: Primary | ICD-10-CM

## 2019-06-06 DIAGNOSIS — R21 RASH: ICD-10-CM

## 2019-06-06 DIAGNOSIS — M25.50 ARTHRALGIA, UNSPECIFIED JOINT: ICD-10-CM

## 2019-06-06 DIAGNOSIS — Z3A.36 36 WEEKS GESTATION OF PREGNANCY: ICD-10-CM

## 2019-06-06 DIAGNOSIS — R50.9 FEVER, UNSPECIFIED FEVER CAUSE: ICD-10-CM

## 2019-06-06 LAB
ALBUMIN SERPL BCP-MCNC: 2.8 G/DL (ref 3.5–5.2)
ALP SERPL-CCNC: 175 U/L (ref 55–135)
ALT SERPL W/O P-5'-P-CCNC: 11 U/L (ref 10–44)
ANION GAP SERPL CALC-SCNC: 7 MMOL/L (ref 8–16)
AST SERPL-CCNC: 25 U/L (ref 10–40)
BASOPHILS # BLD AUTO: 0.01 K/UL (ref 0–0.2)
BASOPHILS NFR BLD: 0.1 % (ref 0–1.9)
BILIRUB SERPL-MCNC: 0.3 MG/DL (ref 0.1–1)
BUN SERPL-MCNC: 6 MG/DL (ref 6–20)
CALCIUM SERPL-MCNC: 9 MG/DL (ref 8.7–10.5)
CHLORIDE SERPL-SCNC: 104 MMOL/L (ref 95–110)
CO2 SERPL-SCNC: 23 MMOL/L (ref 23–29)
CREAT SERPL-MCNC: 0.7 MG/DL (ref 0.5–1.4)
CRP SERPL-MCNC: 64 MG/L (ref 0–8.2)
DIFFERENTIAL METHOD: ABNORMAL
EOSINOPHIL # BLD AUTO: 0 K/UL (ref 0–0.5)
EOSINOPHIL NFR BLD: 0.1 % (ref 0–8)
ERYTHROCYTE [DISTWIDTH] IN BLOOD BY AUTOMATED COUNT: 14.6 % (ref 11.5–14.5)
ERYTHROCYTE [SEDIMENTATION RATE] IN BLOOD BY WESTERGREN METHOD: 37 MM/HR (ref 0–20)
EST. GFR  (AFRICAN AMERICAN): >60 ML/MIN/1.73 M^2
EST. GFR  (NON AFRICAN AMERICAN): >60 ML/MIN/1.73 M^2
GLUCOSE SERPL-MCNC: 81 MG/DL (ref 70–110)
HCT VFR BLD AUTO: 35 % (ref 37–48.5)
HGB BLD-MCNC: 10.8 G/DL (ref 12–16)
IMM GRANULOCYTES # BLD AUTO: 0.04 K/UL (ref 0–0.04)
IMM GRANULOCYTES NFR BLD AUTO: 0.5 % (ref 0–0.5)
LYMPHOCYTES # BLD AUTO: 1 K/UL (ref 1–4.8)
LYMPHOCYTES NFR BLD: 12.7 % (ref 18–48)
MCH RBC QN AUTO: 25.3 PG (ref 27–31)
MCHC RBC AUTO-ENTMCNC: 30.9 G/DL (ref 32–36)
MCV RBC AUTO: 82 FL (ref 82–98)
MONOCYTES # BLD AUTO: 0.3 K/UL (ref 0.3–1)
MONOCYTES NFR BLD: 4.4 % (ref 4–15)
NEUTROPHILS # BLD AUTO: 6.1 K/UL (ref 1.8–7.7)
NEUTROPHILS NFR BLD: 82.2 % (ref 38–73)
NRBC BLD-RTO: 0 /100 WBC
PLATELET # BLD AUTO: 192 K/UL (ref 150–350)
PMV BLD AUTO: 11.4 FL (ref 9.2–12.9)
POTASSIUM SERPL-SCNC: 3.6 MMOL/L (ref 3.5–5.1)
PROT SERPL-MCNC: 6.3 G/DL (ref 6–8.4)
RBC # BLD AUTO: 4.27 M/UL (ref 4–5.4)
SODIUM SERPL-SCNC: 134 MMOL/L (ref 136–145)
WBC # BLD AUTO: 7.47 K/UL (ref 3.9–12.7)

## 2019-06-06 PROCEDURE — 86140 C-REACTIVE PROTEIN: CPT

## 2019-06-06 PROCEDURE — 85025 COMPLETE CBC W/AUTO DIFF WBC: CPT

## 2019-06-06 PROCEDURE — 99999 PR PBB SHADOW E&M-EST. PATIENT-LVL IV: ICD-10-PCS | Mod: PBBFAC,,, | Performed by: NURSE PRACTITIONER

## 2019-06-06 PROCEDURE — 86747 PARVOVIRUS ANTIBODY: CPT

## 2019-06-06 PROCEDURE — 36415 COLL VENOUS BLD VENIPUNCTURE: CPT | Mod: PO

## 2019-06-06 PROCEDURE — 99214 OFFICE O/P EST MOD 30 MIN: CPT | Mod: S$GLB,,, | Performed by: NURSE PRACTITIONER

## 2019-06-06 PROCEDURE — 80053 COMPREHEN METABOLIC PANEL: CPT

## 2019-06-06 PROCEDURE — 99999 PR PBB SHADOW E&M-EST. PATIENT-LVL IV: CPT | Mod: PBBFAC,,, | Performed by: NURSE PRACTITIONER

## 2019-06-06 PROCEDURE — 85651 RBC SED RATE NONAUTOMATED: CPT | Mod: PO

## 2019-06-06 PROCEDURE — 99214 PR OFFICE/OUTPT VISIT, EST, LEVL IV, 30-39 MIN: ICD-10-PCS | Mod: S$GLB,,, | Performed by: NURSE PRACTITIONER

## 2019-06-06 NOTE — PROGRESS NOTES
Subjective:       Patient ID: Eve Kuhn is a 29 y.o. female.    Chief Complaint: Fever; Rash; and pain in joint    Here today with a rash and joint pain. She did get the ER on 06/04/2019 with fever.  tmax 102 - gave her IVF - thought viral illness   Told her to take motrin and tylenol   102.7  48 hrs ago - been 97-99 for the last 48 hrs   Take tylenol every 4 with aches   No sickness in her house - went to her moms for memorial day and taking her child to school  Also with joint pain  Right knee and wrist pain started yesterday - pain to ambulate on her right knee       Rash   Associated symptoms include a fever. Pertinent negatives include no cough, diarrhea, fatigue or shortness of breath.     Vitals:    06/06/19 0845   BP: 98/62   Pulse: 96   Resp: 18   Temp: 98.2 °F (36.8 °C)     Review of Systems   Constitutional: Positive for fever. Negative for diaphoresis and fatigue.   HENT: Negative.    Eyes: Negative.    Respiratory: Negative.  Negative for cough, shortness of breath and wheezing.    Cardiovascular: Negative.  Negative for chest pain.   Gastrointestinal: Negative.  Negative for abdominal pain, diarrhea and nausea.   Endocrine: Negative.    Genitourinary: Negative.  Negative for dysuria and hematuria.   Musculoskeletal: Positive for arthralgias and myalgias.   Skin: Positive for rash. Negative for color change.        abd arms legs  Flat rash   Allergic/Immunologic: Negative.    Neurological: Negative.  Negative for speech difficulty and numbness.   Hematological: Negative.    Psychiatric/Behavioral: Negative.        Past Medical History:   Diagnosis Date    Abnormal Pap smear     Androgenic alopecia     Chronic rhinitis     Hair loss 1/30/2017    Headache(784.0)     IBS (irritable bowel syndrome)        Objective:      Physical Exam   Constitutional: She is oriented to person, place, and time. She appears well-developed and well-nourished.   HENT:   Head: Normocephalic and atraumatic.    Right Ear: Hearing, tympanic membrane, external ear and ear canal normal.   Left Ear: Hearing, tympanic membrane, external ear and ear canal normal.   Nose: Nose normal. No mucosal edema, rhinorrhea or sinus tenderness. Right sinus exhibits no maxillary sinus tenderness and no frontal sinus tenderness. Left sinus exhibits no maxillary sinus tenderness and no frontal sinus tenderness.   Mouth/Throat: Uvula is midline, oropharynx is clear and moist and mucous membranes are normal. No oropharyngeal exudate or posterior oropharyngeal edema.   Eyes: Pupils are equal, round, and reactive to light. Conjunctivae and EOM are normal.   Neck: Normal range of motion. Neck supple.   Cardiovascular: Normal rate, regular rhythm, normal heart sounds and intact distal pulses. Exam reveals no friction rub.   No murmur heard.  Pulmonary/Chest: Effort normal and breath sounds normal. No stridor. No respiratory distress. She has no wheezes. She has no rales.   Abdominal: Soft. Bowel sounds are normal.   Musculoskeletal: She exhibits no edema or tenderness.        Right wrist: She exhibits bony tenderness.        Left wrist: She exhibits bony tenderness.        Right knee: She exhibits decreased range of motion and swelling.   Neurological: She is alert and oriented to person, place, and time. No cranial nerve deficit. Coordination normal.   Skin: Skin is warm and dry. Rash noted.   Flat lacy rash to upper extremities and lower extremities and trunk    Psychiatric: She has a normal mood and affect. Her behavior is normal. Judgment and thought content normal.   Nursing note and vitals reviewed.          Assessment:       1. Fever, unspecified fever cause    2. Arthralgia, unspecified joint    3. Rash    4. 36 weeks gestation of pregnancy        Plan:       Fever, unspecified fever cause  -     Comprehensive metabolic panel; Future; Expected date: 06/06/2019  -     CBC auto differential; Future; Expected date: 06/06/2019  -      Sedimentation rate; Future; Expected date: 06/06/2019  -     C-reactive protein; Future; Expected date: 06/06/2019  -     Cancel: URINALYSIS  -     PARVOVIRUS B19 ANTIBODY, IGG AND IGM; Future; Expected date: 06/06/2019  -     URINALYSIS; Future; Expected date: 06/06/2019  -     Urine culture; Future; Expected date: 06/06/2019    Arthralgia, unspecified joint  -     Comprehensive metabolic panel; Future; Expected date: 06/06/2019  -     CBC auto differential; Future; Expected date: 06/06/2019  -     Sedimentation rate; Future; Expected date: 06/06/2019  -     C-reactive protein; Future; Expected date: 06/06/2019  -     Cancel: URINALYSIS  -     PARVOVIRUS B19 ANTIBODY, IGG AND IGM; Future; Expected date: 06/06/2019  -     URINALYSIS; Future; Expected date: 06/06/2019    Rash  -     Comprehensive metabolic panel; Future; Expected date: 06/06/2019  -     CBC auto differential; Future; Expected date: 06/06/2019  -     Sedimentation rate; Future; Expected date: 06/06/2019  -     C-reactive protein; Future; Expected date: 06/06/2019  -     Cancel: URINALYSIS  -     PARVOVIRUS B19 ANTIBODY, IGG AND IGM; Future; Expected date: 06/06/2019    36 weeks gestation of pregnancy  Will get UA and assess for fever and make sure this is not any other issue       FU with OB GYN regarding pregnancy - will call with culture urine and labs

## 2019-06-10 LAB
PARVOVIRUS B19 ABS IGG & IGM: ABNORMAL
PARVOVIRUS B19 IGG ANTIBODY: POSITIVE
PARVOVIRUS B19 IGM ANTIBODY: NEGATIVE

## 2019-06-13 PROBLEM — O14.90 PREECLAMPSIA: Status: ACTIVE | Noted: 2019-06-13

## 2019-08-23 ENCOUNTER — OFFICE VISIT (OUTPATIENT)
Dept: ORTHOPEDICS | Facility: CLINIC | Age: 29
End: 2019-08-23
Payer: COMMERCIAL

## 2019-08-23 ENCOUNTER — HOSPITAL ENCOUNTER (OUTPATIENT)
Dept: RADIOLOGY | Facility: HOSPITAL | Age: 29
Discharge: HOME | End: 2019-08-23
Attending: NURSE PRACTITIONER
Payer: COMMERCIAL

## 2019-08-23 VITALS
HEART RATE: 85 BPM | HEIGHT: 67 IN | WEIGHT: 160.25 LBS | BODY MASS INDEX: 25.15 KG/M2 | SYSTOLIC BLOOD PRESSURE: 105 MMHG | DIASTOLIC BLOOD PRESSURE: 75 MMHG

## 2019-08-23 DIAGNOSIS — M25.569 KNEE PAIN, UNSPECIFIED CHRONICITY, UNSPECIFIED LATERALITY: ICD-10-CM

## 2019-08-23 DIAGNOSIS — M25.562 CHRONIC PAIN OF BOTH KNEES: Primary | ICD-10-CM

## 2019-08-23 DIAGNOSIS — G89.29 CHRONIC PAIN OF BOTH KNEES: Primary | ICD-10-CM

## 2019-08-23 DIAGNOSIS — R93.89 ABNORMAL FINDINGS ON DIAGNOSTIC IMAGING OF OTHER SPECIFIED BODY STRUCTURES: ICD-10-CM

## 2019-08-23 DIAGNOSIS — M25.569 KNEE PAIN, UNSPECIFIED CHRONICITY, UNSPECIFIED LATERALITY: Primary | ICD-10-CM

## 2019-08-23 DIAGNOSIS — M25.561 CHRONIC PAIN OF BOTH KNEES: Primary | ICD-10-CM

## 2019-08-23 PROCEDURE — 99999 PR PBB SHADOW E&M-EST. PATIENT-LVL III: ICD-10-PCS | Mod: PBBFAC,,, | Performed by: NURSE PRACTITIONER

## 2019-08-23 PROCEDURE — 99999 PR PBB SHADOW E&M-EST. PATIENT-LVL III: CPT | Mod: PBBFAC,,, | Performed by: NURSE PRACTITIONER

## 2019-08-23 PROCEDURE — 97760 ORTHOTIC MGMT&TRAING 1ST ENC: CPT | Mod: S$GLB,,, | Performed by: NURSE PRACTITIONER

## 2019-08-23 PROCEDURE — 99243 OFF/OP CNSLTJ NEW/EST LOW 30: CPT | Mod: S$GLB,,, | Performed by: NURSE PRACTITIONER

## 2019-08-23 PROCEDURE — 73564 X-RAY EXAM KNEE 4 OR MORE: CPT | Mod: TC,50,PO

## 2019-08-23 PROCEDURE — 73564 XR KNEE ORTHO BILAT WITH FLEXION: ICD-10-PCS | Mod: 26,50,, | Performed by: RADIOLOGY

## 2019-08-23 PROCEDURE — 97760 PR ORTHOTIC MGMT&TRAINJ INITIAL ENC EA 15 MINS: ICD-10-PCS | Mod: S$GLB,,, | Performed by: NURSE PRACTITIONER

## 2019-08-23 PROCEDURE — 73564 X-RAY EXAM KNEE 4 OR MORE: CPT | Mod: 26,50,, | Performed by: RADIOLOGY

## 2019-08-23 PROCEDURE — 99243 PR OFFICE CONSULTATION,LEVEL III: ICD-10-PCS | Mod: S$GLB,,, | Performed by: NURSE PRACTITIONER

## 2019-08-23 RX ORDER — NORETHINDRONE 0.35 MG
KIT ORAL
COMMUNITY
Start: 2019-08-17 | End: 2019-08-27

## 2019-08-23 RX ORDER — HYDROCHLOROTHIAZIDE 25 MG/1
TABLET ORAL
Refills: 0 | COMMUNITY
Start: 2019-06-25 | End: 2019-08-27

## 2019-08-23 NOTE — LETTER
August 26, 2019      Cleve Rivera MD  [address] [facility] [address] [place] [address]           [facility]  [address] [facility] [address] [place] [address]  Phone: [phone]          Patient: Eve Kuhn   MR Number: [id number]   YOB: 1990   Date of Visit: 8/23/2019       Dear Dr. Cleve Rivera:    Thank you for referring Eve Kuhn to me for evaluation. Attached you will find relevant portions of my assessment and plan of care.    If you have questions, please do not hesitate to call me. I look forward to following Eve Kuhn along with you.    Sincerely,    Nolvia Vasquez, APRN    Enclosure  CC:  No Recipients    If you would like to receive this communication electronically, please contact [email or web address] or [phone] to request more information on [employer] Link access.    For providers and/or their staff who would like to refer a patient to [facility], please contact us through our one-stop-shop provider referral line, [facility], at [phone].    If you feel you have received this communication in error or would no longer like to receive these types of communications, please e-mail [email or web address]

## 2019-08-23 NOTE — PROGRESS NOTES
"DATE: 2019  PATIENT: Eve Kuhn  REFERRING MD:   CHIEF COMPLAINT:   Chief Complaint   Patient presents with    Right Knee - Pain    Left Knee - Pain       HISTORY:  Eve Kuhn is a 29 y.o. female  who presents for initial evaluation of her Bilateral knee pain. The pain began in  after she had a 101-102 fever, she also had a full body rash.  She was 35 weeks pregnant at the time, she was negative for acute parvo.  Her pain rating today is 4/10 and she feels like her knees are weak and give out on her at times.  Bending activities, sitting, standing and going up and down stairs increase the pain.  She reports she was on a [employer] team for 4 years and also a cheer leader in high school.  She has had chronic knee pain that comes and goes.  She was seen by an orthopedist in [place] about 6-7 years ago that did not perform xray, diagnosed her with "runner's knee" and instructed her to return for MRI if the pain worsened.  She denies swelling, although she reports she feels like she has fluid inside her knee joint, swelling is not visible.  She has not used braces.  She takes ibuprofen prn and tylenol provides no relief.  She reports having multiple episodes of strep growing up and scarlet fever in [facility].  She has a history of Right ankle and Right wrist surgery.  She has a 2 month old baby boy in attendance today, she has 2 weeks left of maternity leave.  She has a 4 year old son with down syndrome at home.      PAST MEDICAL/SURGICAL HISTORY:  Past Medical History:   Diagnosis Date    Abnormal Pap smear     Androgenic alopecia     Chronic rhinitis     Hair loss 2017    Headache(784.0)     IBS (irritable bowel syndrome)      Past Surgical History:   Procedure Laterality Date     SECTION       SECTION N/A 2019    Performed by [facility]ee Jo[facility]quin [facility] at [facility]D    COLONOSCOPY      Dr. Tyson: normal findings per patient report "    EXTERNAL EAR SURGERY      for cauliflower ear    Reconstructive ankly surgery      TONSILLECTOMY, ADENOIDECTOMY      UPPER GASTROINTESTINAL ENDOSCOPY  2011    Dr. Tyson: normal findings per patient report       Current Medications:   Current Outpatient Medications:     furosemide (LASIX) 20 MG tablet, Take 1 tablet (20 mg total) by mouth once daily. for 5 days, Disp: 5 tablet, Rfl: 0    ibuprofen (ADVIL,MOTRIN) 800 MG tablet, Take 1 tablet (800 mg total) by mouth 3 (three) times daily., Disp: 30 tablet, Rfl: 0    oxyCODONE-acetaminophen (PERCOCET) 5-325 mg per tablet, Take 1-2 tablets by mouth every 4 (four) hours as needed., Disp: 30 tablet, Rfl: 0    prenatal 25/iron fum/folic/dha (PRENATAL-1 ORAL), Take 1 tablet by mouth once daily., Disp: , Rfl:     rizatriptan (MAXALT) 10 MG tablet, TK 1 T PO  D PRN MIGRAINE MAX 3 TS WEEKLY, Disp: 9 tablet, Rfl: 3    topiramate (TOPAMAX) 50 MG tablet, Take 1 tablet (50 mg total) by mouth once daily., Disp: 30 tablet, Rfl: 11    Family History: family history was reviewed and is noncontributory  Social History:   Social History     Socioeconomic History    Marital status:      Spouse name: Not on file    Number of children: Not on file    Years of education: Not on file    Highest education level: Not on file   Occupational History    Not on file   Social Needs    Financial resource strain: Not on file    Food insecurity:     Worry: Not on file     Inability: Not on file    Transportation needs:     Medical: Not on file     Non-medical: Not on file   Tobacco Use    Smoking status: Never Smoker    Smokeless tobacco: Never Used   Substance and Sexual Activity    Alcohol use: No     Comment: occasion    Drug use: No    Sexual activity: Yes     Partners: Male   Lifestyle    Physical activity:     Days per week: Not on file     Minutes per session: Not on file    Stress: Not on file   Relationships    Social connections:     Talks on phone: Not on  "file     Gets together: Not on file     Attends Rastafarian service: Not on file     Active member of club or organization: Not on file     Attends meetings of clubs or organizations: Not on file     Relationship status: Not on file   Other Topics Concern    Not on file   Social History Narrative    Employed    engaged- wedding in 11/2018; [employer] med tech; 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 Negative for unexplained weight loss.  Eyes: no redness, no discharge  Ears: no ear pain or tinnitus  Cardiovascular: Negative for chest pain, irregular heartbeat, leg swelling and palpitations.   Respiratory: Negative for cough and shortness of breath.   Gastrointestinal: Negative for abdominal pain, nausea and vomiting.   Genitourinary: Negative for bladder incontinence and dysuria.   Neurological: Negative for numbness.   Psychiatric/Behavioral: Negative for behavior changes.   Endocrine: Negative for palpitations.   Hematologic/Lymphatic: Negative for bleeding disorders.  Skin: Negative for pruritis or rash.     PHYSICAL EXAM:  Constitutional:  Eve Kuhn is a well developed, well nourished female in no acute distress.   /75   Pulse 85   Ht 5' 7" (1.702 m)   Wt 72.7 kg (160 lb 4.4 oz)   LMP 09/25/2018 (Approximate)   BMI 25.10 kg/m²   Psychiatric: pleasant,normal mood and affect, behavior is normal    Musculoskeletal:  bilateral knee evaluated the following:    Gait and Alignment  Inspection for ecchymosis, swelling, atrophy, or deformity  Inspection for intra-articular and/or bursal effusions  Tenderness to palpation over the bony and soft tissue structures around the knee  Range of Motion and presence of extensor lag/contractures  Sensation and motor strength  Varus/valgus or anterior/posterior/rotatory instability  Flexion pinch and Keyla's Tests  Patellar alignment/tracking/pain to palpation  Vascular exam to " include skin temperature/color/capillary refill    Remarkable findings included:  No edema, effusion, ecchymosis or obvious deformity  tender to palpation about the patella  ROM full  Strength 5/5  No gross instability  Neurological:  Sensation intact to light touch, normal reflexes. Coordination normal.   Skin: warm, dry, intact  Cardiovascular: capillary refill less than 3 seconds, pulses 2+      IMAGING:   X-ray obtained and personally reviewed with patient.  Radiologist report as follows:  X-ray Knee Ortho Bilateral with Flexion  Narrative: EXAMINATION:  XR KNEE ORTHO BILAT WITH FLEXION    CLINICAL HISTORY:  Pain in unspecified knee    TECHNIQUE:  AP standing of both knees, PA flexion standing views of both knees, and Merchant views of both knees were performed.  Lateral views of both knees were also performed.    COMPARISON:  None    FINDINGS:  Multiple calcific or ossific structures are present along the posteromedial joint of the left knee, measuring up to 14 mm.  No fracture, dislocation, or joint effusion is identified bilaterally.  Impression: No acute osseous abnormality.  Possible loose bodies of the left knee.    Electronically signed by: Ruddy Ricks MD  Date:    08/23/2019  Time:    08:41        ASSESSMENT:   Bilateral chronic knee pain    PLAN:  The nature of the diagnosis, using models and diagrams when appropriate, was explained to the patient in detail. Treatment option discussed included non-operative measures of custom orthotic, rest,  modification of activities, application of ice, elevation of extremity, compression, over the counter pain/antiinflammatory relief and physica/occupational therapy.  More aggressive treatment options include MRI and referal to orthopedic surgeon.  All questions answered and the patient wishes to proceed today with bilateral bioskin braces.  Chely Taylor and I performed a custom sling orthotic /brace adjustment, fitting and training with the patient. The  patient demonstrated understanding and proper care. This was performed for 15 minutes.  I have scheduled her bilateral knee MRI and consultation with Dr Palmer for results and treatment recommendations.      Answers for HPI/ROS submitted by the patient on 8/22/2019   Leg pain  unexpected weight change: No  appetite change : No  sleep disturbance: No  IMMUNOCOMPROMISED: Yes  nervous/ anxious: No  dysphoric mood: No  rash: No  visual disturbance: No  eye redness: No  eye pain: No  ear pain: No  tinnitus: No  hearing loss: No  sinus pressure : No  nosebleeds: No  enviro allergies: Yes  food allergies: No  cough: No  shortness of breath: No  sweating: No  dysuria: No  frequency: No  difficulty urinating: No  hematuria: No  painful intercourse: No  chest pain: No  palpitations: No  nausea: No  vomiting: No  diarrhea: No  blood in stool: No  constipation: No  headaches: No  dizziness: No  numbness: No  seizures: No  joint swelling: No  myalgia: No  weakness: No  back pain: No  Pain Chronicity: new  History of trauma: Yes  Onset: more than 1 month ago  Frequency: constantly  Progression since onset: unchanged  injury location: at home  pain- numeric: 5/10  pain location: left knee, right knee  pain quality: sharp, throbbing  Radiating Pain: No  Aggravating factors: activity, bending, bearing weight, flexion, sitting  fever: Yes  inability to bear weight: No  itching: No  joint locking: No  limited range of motion: No  stiffness: No  tingling: No  Treatments tried: NSAIDs, rest  physical therapy: not tried  Improvement on treatment: no relief

## 2019-09-16 ENCOUNTER — HOSPITAL ENCOUNTER (OUTPATIENT)
Dept: RADIOLOGY | Facility: HOSPITAL | Age: 29
Discharge: HOME | End: 2019-09-16
Attending: NURSE PRACTITIONER
Payer: COMMERCIAL

## 2019-09-16 DIAGNOSIS — R93.89 ABNORMAL FINDINGS ON DIAGNOSTIC IMAGING OF OTHER SPECIFIED BODY STRUCTURES: ICD-10-CM

## 2019-09-16 PROCEDURE — 73721 MRI KNEE WITHOUT CONTRAST RIGHT: ICD-10-PCS | Mod: 26,RT,, | Performed by: RADIOLOGY

## 2019-09-16 PROCEDURE — 73721 MRI KNEE WITHOUT CONTRAST LEFT: ICD-10-PCS | Mod: 26,LT,, | Performed by: RADIOLOGY

## 2019-09-16 PROCEDURE — 73721 MRI JNT OF LWR EXTRE W/O DYE: CPT | Mod: TC,PO,LT

## 2019-09-16 PROCEDURE — 73721 MRI JNT OF LWR EXTRE W/O DYE: CPT | Mod: 26,LT,, | Performed by: RADIOLOGY

## 2019-09-16 PROCEDURE — 73721 MRI JNT OF LWR EXTRE W/O DYE: CPT | Mod: TC,PO,RT

## 2019-09-16 PROCEDURE — 73721 MRI JNT OF LWR EXTRE W/O DYE: CPT | Mod: 26,RT,, | Performed by: RADIOLOGY

## 2019-09-16 RX ORDER — GADOBUTROL 604.72 MG/ML
6 INJECTION INTRAVENOUS
Status: DISCONTINUED | OUTPATIENT
Start: 2019-09-16 | End: 2019-09-17 | Stop reason: HOSPADM

## 2019-09-20 ENCOUNTER — OFFICE VISIT (OUTPATIENT)
Dept: ORTHOPEDICS | Facility: CLINIC | Age: 29
End: 2019-09-20
Payer: COMMERCIAL

## 2019-09-20 ENCOUNTER — TELEPHONE (OUTPATIENT)
Dept: RHEUMATOLOGY | Facility: CLINIC | Age: 29
End: 2019-09-20

## 2019-09-20 VITALS — HEIGHT: 67 IN | WEIGHT: 140 LBS | BODY MASS INDEX: 21.97 KG/M2

## 2019-09-20 DIAGNOSIS — M25.562 ACUTE PAIN OF BOTH KNEES: ICD-10-CM

## 2019-09-20 DIAGNOSIS — M25.561 CHRONIC PAIN OF RIGHT KNEE: Primary | ICD-10-CM

## 2019-09-20 DIAGNOSIS — G89.29 CHRONIC PAIN OF RIGHT KNEE: Primary | ICD-10-CM

## 2019-09-20 DIAGNOSIS — M25.561 ACUTE PAIN OF BOTH KNEES: ICD-10-CM

## 2019-09-20 DIAGNOSIS — M25.562 ACUTE PAIN OF LEFT KNEE: ICD-10-CM

## 2019-09-20 PROCEDURE — 99999 PR PBB SHADOW E&M-EST. PATIENT-LVL III: ICD-10-PCS | Mod: PBBFAC,,, | Performed by: ORTHOPAEDIC SURGERY

## 2019-09-20 PROCEDURE — 99213 OFFICE O/P EST LOW 20 MIN: CPT | Mod: S$GLB,,, | Performed by: ORTHOPAEDIC SURGERY

## 2019-09-20 PROCEDURE — 99213 PR OFFICE/OUTPT VISIT, EST, LEVL III, 20-29 MIN: ICD-10-PCS | Mod: S$GLB,,, | Performed by: ORTHOPAEDIC SURGERY

## 2019-09-20 PROCEDURE — 99999 PR PBB SHADOW E&M-EST. PATIENT-LVL III: CPT | Mod: PBBFAC,,, | Performed by: ORTHOPAEDIC SURGERY

## 2019-09-20 NOTE — PROGRESS NOTES
HISTORY OF PRESENT ILLNESS:  29 years old, is having right greater than left   knee pain.  She rates the pain as 2/10 on good days, 8/10 on bad days.  Right   knee pain, she has had for two years' time, left knee pain started after a viral   illness in June.  She says the pain is worse in the later part of the day.  She   says it is worse with bending of her knees.  Not had any formal treatment.    Pain with going up and down stairs.    PHYSICAL EXAMINATION:  Exam today shows positive patellar crunch.  No   instability.  She has swelling about her knee.  Skin is intact.  Compartments   are soft.  She had an MRI of both of her knees.  MRI of her left knee shows   possible intraarticular loose bodies with ** appreciated.  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    ASSESSMENT:  Anterior knee pain.    PLAN:  Physical therapy, quad strengthening and hamstring stretching.  She has a   brace that she can use.  Follow up as needed.        KRISSY/ALVARO  dd: 09/20/2019 10:26:44 (CDT)  td: 09/21/2019 06:28:48 (CDT)  Doc ID   #[id number]  Job ID #[id number]    CC:     Further History  Aching pain  Worse with activity  Relieved with rest  No other associated symptoms  No other radiation    Further Exam  Alert and oriented  Pleasant  Contralateral limb has appropriate range of motion for age and condition  Contralateral limb has appropriate strength for age and condition  Contralateral limb has appropriate stability  for age and condition  No adenopathy  Pulses are appropriate for current condition  Skin is intact        Chief Complaint    Chief Complaint   Patient presents with    Left Knee - Pain    Right Knee - Pain       HPI  Eve Kuhn is a 29 y.o.  female who presents with       Past Medical History  Past Medical History:   Diagnosis Date    Abnormal Pap smear     Androgenic alopecia     Chronic rhinitis     Hair loss 1/30/2017    Headache(784.0)     IBS (irritable bowel syndrome)         Past Surgical History  Past Surgical History:   Procedure Laterality Date     SECTION       SECTION N/A 2019    Performed [facility] L[facility]D    COLONOSCOPY      Dr. Tyson: normal findings per patient report    EXTERNAL EAR SURGERY      for cauliflower ear    Reconstructive ankly surgery      TONSILLECTOMY, ADENOIDECTOMY      UPPER GASTROINTESTINAL ENDOSCOPY      Dr. Tyson: normal findings per patient report       Medications  No current outpatient medications on file.     No current facility-administered medications for this visit.        Allergies  Review of patient's allergies indicates:   Allergen Reactions    Vancomycin analogues Swelling and Rash    Ciprofloxacin Swelling    Monistat 1 (tioconazole) [tioconazole]        Family History  Family History   Problem Relation Age of Onset    Migraines Mother     Allergies Mother     Allergic rhinitis Mother     Heart disease Father     Hyperlipidemia Father     Migraines Father     Cancer Father     Allergies Father     Allergic rhinitis Father     Migraines Sister     Heart disease Maternal Grandmother     Allergies Maternal Grandmother     Allergic rhinitis Maternal Grandmother     Heart disease Maternal Grandfather     Allergies Maternal Grandfather     Allergic rhinitis Maternal Grandfather     Allergies Paternal Grandmother     Allergic rhinitis Paternal Grandmother     Allergies Paternal Grandfather     Allergic rhinitis Paternal Grandfather     Colon polyps Other     Angioedema Neg Hx     Asthma Neg Hx     Atopy Neg Hx     Eczema Neg Hx     Immunodeficiency Neg Hx     Rhinitis Neg Hx     Urticaria Neg Hx     Colon cancer Neg Hx     Crohn's disease Neg Hx     Stomach cancer Neg Hx     Ulcerative colitis Neg Hx     Esophageal cancer Neg Hx        Social History  Social History     Socioeconomic History    Marital status:      Spouse name: Not on file    Number  of children: Not on file    Years of education: Not on file    Highest education level: Not on file   Occupational History    Not on file   Social Needs    Financial resource strain: Not on file    Food insecurity:     Worry: Not on file     Inability: Not on file    Transportation needs:     Medical: Not on file     Non-medical: Not on file   Tobacco Use    Smoking status: Never Smoker    Smokeless tobacco: Never Used   Substance and Sexual Activity    Alcohol use: No     Comment: occasion    Drug use: No    Sexual activity: Yes     Partners: Male   Lifestyle    Physical activity:     Days per week: Not on file     Minutes per session: Not on file    Stress: Not on file   Relationships    Social connections:     Talks on phone: Not on file     Gets together: Not on file     Attends Episcopal service: Not on file     Active member of club or organization: Not on file     Attends meetings of clubs or organizations: Not on file     Relationship status: Not on file   Other Topics Concern    Not on file   Social History Narrative    Employed    engaged- wedding in 11/2018; [employer]/nuclear med tech; 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  Body mass index is 21.93 kg/m².  Physical Examination:     General appearance -  well appearing, and in no distress  Mental status - awake  Neck - supple  Chest -  symmetric air entry  Heart - normal rate   Abdomen - soft      Assessment     1. Chronic pain of right knee    2. Acute pain of left knee    3. Acute pain of both knees          Plan

## 2019-09-20 NOTE — Clinical Note
September 20, 2019      Nolvia Vasquez, APRN  [address] [facility] [address] [place] [address]           [facility]  [address] [facility] [address] [place] [address]  Phone: [phone]          Patient: Eve Kuhn   MR Number: [id number]   YOB: 1990   Date of Visit: 9/20/2019       Dear Nolvia Vasquez:    Thank you for referring Eve Kuhn to me for evaluation. Attached you will find relevant portions of my assessment and plan of care.    If you have questions, please do not hesitate to call me. I look forward to following Eve Kuhn along with you.    Sincerely,    Kishore Palmer MD    Enclosure  CC:  No Recipients    If you would like to receive this communication electronically, please contact [email or web address] or [phone] to request more information on [employer] access.    For providers and/or their staff who would like to refer a patient to [facility], please contact us through our one-stop-shop provider referral line, [facility], at [phone].    If you feel you have received this communication in error or would no longer like to receive these types of communications, please e-mail [email or web address]

## 2019-09-20 NOTE — TELEPHONE ENCOUNTER
----- Message from Forest Renner sent at 9/20/2019 12:28 PM CDT -----  Contact: Pt   Pt would like to be called back regarding scheduling as a new patient for abnormal blood work. Pt was referred by Dr. Kishore Smith conformation was verbal I did not see a referral in system.      Pt can be reached at [phone].    Thank You.

## 2019-09-24 ENCOUNTER — CLINICAL SUPPORT (OUTPATIENT)
Dept: REHABILITATION | Facility: HOSPITAL | Age: 29
End: 2019-09-24
Attending: ORTHOPAEDIC SURGERY
Payer: COMMERCIAL

## 2019-09-24 DIAGNOSIS — R68.89 WORSENING FUNCTIONAL ENDURANCE: ICD-10-CM

## 2019-09-24 PROCEDURE — 97110 THERAPEUTIC EXERCISES: CPT | Mod: PO | Performed by: PHYSICAL THERAPIST

## 2019-09-24 PROCEDURE — 97161 PT EVAL LOW COMPLEX 20 MIN: CPT | Mod: PO | Performed by: PHYSICAL THERAPIST

## 2019-09-24 NOTE — PLAN OF CARE
[facility]  Physical Therapy Initial Evaluation    Name: Eve Kuhn  Clinic Number: [id number]    Therapy Diagnosis:   Encounter Diagnosis   Name Primary?    Worsening functional endurance      Physician: Kishore Palmer MD    Physician Orders: PT Eval and Treat   Medical Diagnosis from Referral: M25.561,M25.562 (ICD-10-CM) - Acute pain of both knees  Evaluation Date: 2019  Authorization Period Expiration: 2019  Plan of Care Expiration: 2019  Visit # / Visits authorized:     Time In: 1100  Time Out: 1150  Total Billable Time: 42 minutes    Precautions: Standard    Subjective   Date of onset: Chronic  History of current condition - Eve reports: R knee pain began about 7 years ago. This pain was med knee. She experienced the pain upon waking. She was given a home program and a hinge brace. The brace aggravated the pain. Less frequent sx, but she has not kept up her ex.    L knee pain started about in  prior to having her 3 mo old son. She had a virus that may have stimulated an inflammatory process. She was then given B knee sleeves which help while wearing them, but increase post wear pain.    She is experiencing greater pain with the sitting down motion. She reports that she takes longer to sit and standing then walk. She does not avoid actions, but deals with increased pain.     Medical History:   Past Medical History:   Diagnosis Date    Abnormal Pap smear     Androgenic alopecia     Chronic rhinitis     Hair loss 2017    Headache(784.0)     IBS (irritable bowel syndrome)        Surgical History:   Eve Kuhn  has a past surgical history that includes Reconstructive ankly surgery; TONSILLECTOMY, ADENOIDECTOMY; External ear surgery;  section; Colonoscopy (); Upper gastrointestinal endoscopy (); and  section (N/A, 2019).    Medications:   Eve currently has no medications in their  medication list.    Allergies:   Review of patient's allergies indicates:   Allergen Reactions    Vancomycin analogues Swelling and Rash    Ciprofloxacin Swelling    Monistat 1 (tioconazole) [tioconazole]         Imaging, xray B knee 8/23/19: No acute osseous abnormality.  Possible loose bodies of the left knee.    MRI L knee without contrast 9/16/2019: 1. Intra-articular osseous bodies along the posteromedial corner of the left knee.  No definite donor site appreciated.  Specifically, no high-grade partial or full-thickness cartilage loss or fissuring noted in the knee joint.    MRI R knee without contrast 9/19/2019: 1. Imaging findings which suggest inflammation and/or scarring in the suprapatellar fat pad/suprapatellar fat pad impingement deep to the distal quadriceps tendon.  Please correlate for symptoms of anterior knee pain.  2. High-grade partial-thickness (at least 90%) chondral fissure within the medial patellar facet at the level of the patellar midpole.    Prior Therapy: none, home program given from   Social History:  lives with their family and lives with their spouse  Occupation: Claims generalist  Prior Level of Function: (I) L knee, Same, worked through R knee pain  Current Level of Function: completes everything with increased pain    Pain:  Current 3/10, worst 7/10, best 3/10   Location: bilateral knee   Description: Aching, Sharp and stabbing  Aggravating Factors: Sitting, Standing and Bending  Easing Factors: ice and rest    Pts goals: to get it better, even if only less the pain, make knees more stable.    Objective     Observation: good arches, most of wt is medial. Recommended arch supports to her flat-based shoes.    Knee AROM:  R: 0-150 deg  L: 0-150 deg    Lower Extremity Strength  Right LE  Left LE    Knee extension: 4+/5 Knee extension: 4+/5   Knee flexion: 4/5 Knee flexion: 4/5   Hip flexion: 4/5 Hip flexion: 4/5   Hip extension:  4-/5 Hip extension: 4/5   Hip abduction: 4/5 Hip  abduction: 4/5   Hip adduction: 4-/5 Hip adduction 4-/5     Special Tests:  Keyla: neg B  Valgus: neg B  Varus: neg B  Patellar compression: pos R, crepitus noted    Flexibility:    Montrell's test: R = pos ; L = neg   Osmar test: R = very mild tight ; L = very mild tight    Palpation: pos R med joint line, pos med to R patellar t., pos L patellar t, pos R piriformis    CMS Impairment/Limitation/Restriction for FOTO KNEE Survey    Therapist reviewed FOTO scores for Eve Kuhn on 2019.   FOTO documents entered into [facility] [employer] - see Media section.    Limitation Score: 47%         TREATMENT   Treatment Time In: 1142  Treatment Time Out: 1150  Total Treatment time separate from Evaluation: 08 minutes    Eve received therapeutic exercises to develop strength and core stabilization for 08 minutes including:  See pt instructions for review of HEP.    Home Exercises and Patient Education Provided    Education provided:   - HEP    Written Home Exercises Provided: yes.  Exercises were reviewed and Eve was able to demonstrate them prior to the end of the session.  Eve demonstrated good  understanding of the education provided.     See EMR under Patient Instructions for exercises provided 2019.    Assessment   Eve is a 29 y.o. female referred to outpatient Physical Therapy with a medical diagnosis of M25.561,M25.562 (ICD-10-CM) - Acute pain of both knees. Pt presents with about 3 mo s/p  with impaired core, hip, and knee strength lending to knee pain.    Pt prognosis is Good.   Pt will benefit from skilled outpatient Physical Therapy to address the deficits stated above and in the chart below, provide pt/family education, and to maximize pt's level of independence.     Plan of care discussed with patient: Yes  Pt's spiritual, cultural and educational needs considered and patient is agreeable to the plan of care and goals as stated below:     Anticipated Barriers for therapy:  none    Medical Necessity is demonstrated by the following  History  Co-morbidities and personal factors that may impact the plan of care Co-morbidities:     Past Medical History:   Diagnosis Date    Abnormal Pap smear     Androgenic alopecia     Chronic rhinitis     Hair loss 1/30/2017    Headache(784.0)     IBS (irritable bowel syndrome)        Personal Factors:   no deficits     low   Examination  Body Structures and Functions, activity limitations and participation restrictions that may impact the plan of care Body Regions:   lower extremities    Body Systems:    strength  motor control  motor learning    Participation Restrictions:   IMPAIRED ENDURANCE DUE TO PAIN IN B KNEES    Activity limitations:   Learning and applying knowledge  no deficits    General Tasks and Commands  no deficits    Communication  no deficits    Mobility  lifting and carrying objects  walking    Self care  toileting    Domestic Life  doing house work (cleaning house, washing dishes, laundry)  assisting others    Interactions/Relationships  no deficits    Life Areas  no deficits    Community and Social Life  no deficits         moderate   Clinical Presentation evolving clinical presentation with changing clinical characteristics moderate   Decision Making/ Complexity Score: moderate     Goals:  Short Term Goals: 3 weeks   -Pt will demo improving LE strength to grossly 4/5 or better for decreased pain.  -Pt will wear proper shoe wear or use insert for med arch support for optimal LE alignment and to dec pain.    Long Term Goals: 6 weeks   -Neg R Montrell's test for tight ITB to assist with decreasing knee pain.  -Improve FOTO impairment score to 33% for improved QOL.    Plan   Plan of care Certification: 9/24/2019 to 11/08/2019.    Outpatient Physical Therapy 2 times weekly for 6 weeks to include the following interventions: Electrical Stimulation IFC, Manual Therapy, Moist Heat/ Ice, Neuromuscular Re-ed, Patient Education, Self Care,  Therapeutic Activites, Therapeutic Exercise and Ultrasound.     Barb Cuevas, PT

## 2019-09-25 PROBLEM — R68.89 WORSENING FUNCTIONAL ENDURANCE: Status: ACTIVE | Noted: 2019-09-25

## 2019-09-27 ENCOUNTER — TELEPHONE (OUTPATIENT)
Dept: RHEUMATOLOGY | Facility: CLINIC | Age: 29
End: 2019-09-27

## 2019-09-27 NOTE — TELEPHONE ENCOUNTER
----- Message from Leni Kiran sent at 9/27/2019  1:28 PM CDT -----  Contact: patient   Type:  Patient Returning Call    Who Called: patient   Who Left Message for Patient:  Manda  Does the patient know what this is regarding?:  n/a  Best Call Back Number:  [phone] (home)

## 2019-10-01 NOTE — TELEPHONE ENCOUNTER
Returned patient call regarding new patient appointment. Informed will schedule for first available with Dr Masterson. Informed first appointment is April 9th at 11 am. Patient aware of date and time of appointment.

## 2019-10-03 ENCOUNTER — CLINICAL SUPPORT (OUTPATIENT)
Dept: REHABILITATION | Facility: HOSPITAL | Age: 29
End: 2019-10-03
Attending: ORTHOPAEDIC SURGERY
Payer: COMMERCIAL

## 2019-10-03 DIAGNOSIS — R68.89 WORSENING FUNCTIONAL ENDURANCE: ICD-10-CM

## 2019-10-03 PROCEDURE — 97110 THERAPEUTIC EXERCISES: CPT | Mod: PO | Performed by: PHYSICAL THERAPIST

## 2019-10-11 ENCOUNTER — TELEPHONE (OUTPATIENT)
Dept: REHABILITATION | Facility: HOSPITAL | Age: 29
End: 2019-10-11

## 2019-10-15 ENCOUNTER — DOCUMENTATION ONLY (OUTPATIENT)
Dept: REHABILITATION | Facility: HOSPITAL | Age: 29
End: 2019-10-15

## 2019-10-18 ENCOUNTER — DOCUMENTATION ONLY (OUTPATIENT)
Dept: REHABILITATION | Facility: HOSPITAL | Age: 29
End: 2019-10-18

## 2019-10-18 NOTE — PROGRESS NOTES
"LM for pt. She has cancelled remaining appts due to "time no longer works." Requested returned call. Will discharge Monday if pt has not updated PT if she plans to return and attempt another appt time.  "

## 2019-10-22 ENCOUNTER — DOCUMENTATION ONLY (OUTPATIENT)
Dept: REHABILITATION | Facility: HOSPITAL | Age: 29
End: 2019-10-22

## 2019-10-22 PROBLEM — R68.89 WORSENING FUNCTIONAL ENDURANCE: Status: RESOLVED | Noted: 2019-09-25 | Resolved: 2019-10-22

## 2019-10-22 NOTE — PROGRESS NOTES
Outpatient Therapy Discharge Summary     [id number]    Therapy Diagnosis:        Encounter Diagnosis   Name Primary?    Worsening functional endurance        Physician: Kishore Palmer MD    Physician Orders: PT Eval and Treat   Medical Diagnosis from Referral: M25.561,M25.562 (ICD-10-CM) - Acute pain of both knees  Evaluation Date: 9/24/2019    Date of Last visit: 10/03/2019  Total Visits Received: 2  Cancelled Visits: all remaining appts, appt time no longer worked.  No Show Visits: 0    Assessment    Goals:   Short Term Goals: 3 weeks   -Pt will demo improving LE strength to grossly 4/5 or better for decreased pain.  -Pt will wear proper shoe wear or use insert for med arch support for optimal LE alignment and to dec pain.     Long Term Goals: 6 weeks   -Neg R Montrell's test for tight ITB to assist with decreasing knee pain.  -Improve FOTO impairment score to 33% for improved QOL.    Unable to assess goals, attended 2 treatments. She appeared to be doing well at last communication.    Discharge reason: Patient has not attended therapy since 10/03/2019. PT unable to reach pt with 2 attempts and LM.    Plan   This patient is discharged from Physical Therapy.

## 2019-11-09 ENCOUNTER — TELEPHONE (OUTPATIENT)
Dept: FAMILY MEDICINE | Facility: CLINIC | Age: 29
End: 2019-11-09

## 2019-12-09 ENCOUNTER — LAB VISIT (OUTPATIENT)
Dept: LAB | Facility: HOSPITAL | Age: 29
End: 2019-12-09
Attending: NURSE PRACTITIONER
Payer: COMMERCIAL

## 2019-12-09 ENCOUNTER — OFFICE VISIT (OUTPATIENT)
Dept: FAMILY MEDICINE | Facility: CLINIC | Age: 29
End: 2019-12-09
Payer: COMMERCIAL

## 2019-12-09 VITALS
TEMPERATURE: 98 F | OXYGEN SATURATION: 98 % | WEIGHT: 144.81 LBS | HEART RATE: 85 BPM | SYSTOLIC BLOOD PRESSURE: 104 MMHG | HEIGHT: 67 IN | DIASTOLIC BLOOD PRESSURE: 72 MMHG | BODY MASS INDEX: 22.73 KG/M2

## 2019-12-09 DIAGNOSIS — M25.50 ARTHRALGIA, UNSPECIFIED JOINT: Primary | ICD-10-CM

## 2019-12-09 DIAGNOSIS — R19.7 DIARRHEA, UNSPECIFIED TYPE: ICD-10-CM

## 2019-12-09 DIAGNOSIS — R10.9 ABDOMINAL CRAMPING: ICD-10-CM

## 2019-12-09 DIAGNOSIS — M25.50 ARTHRALGIA, UNSPECIFIED JOINT: ICD-10-CM

## 2019-12-09 DIAGNOSIS — R21 RASH: ICD-10-CM

## 2019-12-09 DIAGNOSIS — R79.82 ELEVATED C-REACTIVE PROTEIN (CRP): ICD-10-CM

## 2019-12-09 DIAGNOSIS — E55.9 VITAMIN D DEFICIENCY: ICD-10-CM

## 2019-12-09 DIAGNOSIS — R11.2 NAUSEA AND VOMITING, INTRACTABILITY OF VOMITING NOT SPECIFIED, UNSPECIFIED VOMITING TYPE: ICD-10-CM

## 2019-12-09 LAB
25(OH)D3+25(OH)D2 SERPL-MCNC: 35 NG/ML (ref 30–96)
ALBUMIN SERPL BCP-MCNC: 4.3 G/DL (ref 3.5–5.2)
ALP SERPL-CCNC: 65 U/L (ref 55–135)
ALT SERPL W/O P-5'-P-CCNC: 8 U/L (ref 10–44)
ANION GAP SERPL CALC-SCNC: 10 MMOL/L (ref 8–16)
AST SERPL-CCNC: 17 U/L (ref 10–40)
BASOPHILS # BLD AUTO: 0.04 K/UL (ref 0–0.2)
BASOPHILS NFR BLD: 0.5 % (ref 0–1.9)
BILIRUB SERPL-MCNC: 0.3 MG/DL (ref 0.1–1)
BUN SERPL-MCNC: 7 MG/DL (ref 6–20)
CALCIUM SERPL-MCNC: 9.7 MG/DL (ref 8.7–10.5)
CHLORIDE SERPL-SCNC: 105 MMOL/L (ref 95–110)
CO2 SERPL-SCNC: 25 MMOL/L (ref 23–29)
CREAT SERPL-MCNC: 0.8 MG/DL (ref 0.5–1.4)
CRP SERPL-MCNC: 8.6 MG/L (ref 0–8.2)
DIFFERENTIAL METHOD: NORMAL
EOSINOPHIL # BLD AUTO: 0.1 K/UL (ref 0–0.5)
EOSINOPHIL NFR BLD: 1 % (ref 0–8)
ERYTHROCYTE [DISTWIDTH] IN BLOOD BY AUTOMATED COUNT: 13.9 % (ref 11.5–14.5)
ERYTHROCYTE [SEDIMENTATION RATE] IN BLOOD BY WESTERGREN METHOD: 19 MM/HR (ref 0–20)
EST. GFR  (AFRICAN AMERICAN): >60 ML/MIN/1.73 M^2
EST. GFR  (NON AFRICAN AMERICAN): >60 ML/MIN/1.73 M^2
GLUCOSE SERPL-MCNC: 81 MG/DL (ref 70–110)
HCT VFR BLD AUTO: 42.7 % (ref 37–48.5)
HGB BLD-MCNC: 14.2 G/DL (ref 12–16)
LYMPHOCYTES # BLD AUTO: 2 K/UL (ref 1–4.8)
LYMPHOCYTES NFR BLD: 26.1 % (ref 18–48)
MCH RBC QN AUTO: 28.9 PG (ref 27–31)
MCHC RBC AUTO-ENTMCNC: 33.3 G/DL (ref 32–36)
MCV RBC AUTO: 87 FL (ref 82–98)
MONOCYTES # BLD AUTO: 0.4 K/UL (ref 0.3–1)
MONOCYTES NFR BLD: 5.2 % (ref 4–15)
NEUTROPHILS # BLD AUTO: 5.1 K/UL (ref 1.8–7.7)
NEUTROPHILS NFR BLD: 67.2 % (ref 38–73)
PLATELET # BLD AUTO: 301 K/UL (ref 150–350)
PMV BLD AUTO: 10.9 FL (ref 9.2–12.9)
POTASSIUM SERPL-SCNC: 4.2 MMOL/L (ref 3.5–5.1)
PROT SERPL-MCNC: 8.2 G/DL (ref 6–8.4)
RBC # BLD AUTO: 4.92 M/UL (ref 4–5.4)
RHEUMATOID FACT SERPL-ACNC: <10 IU/ML (ref 0–15)
SODIUM SERPL-SCNC: 140 MMOL/L (ref 136–145)
WBC # BLD AUTO: 7.63 K/UL (ref 3.9–12.7)

## 2019-12-09 PROCEDURE — 82306 VITAMIN D 25 HYDROXY: CPT

## 2019-12-09 PROCEDURE — 99999 PR PBB SHADOW E&M-EST. PATIENT-LVL III: CPT | Mod: PBBFAC,,, | Performed by: NURSE PRACTITIONER

## 2019-12-09 PROCEDURE — 86038 ANTINUCLEAR ANTIBODIES: CPT

## 2019-12-09 PROCEDURE — 80053 COMPREHEN METABOLIC PANEL: CPT | Mod: PO

## 2019-12-09 PROCEDURE — 86431 RHEUMATOID FACTOR QUANT: CPT

## 2019-12-09 PROCEDURE — 99214 PR OFFICE/OUTPT VISIT, EST, LEVL IV, 30-39 MIN: ICD-10-PCS | Mod: S$GLB,,, | Performed by: NURSE PRACTITIONER

## 2019-12-09 PROCEDURE — 87427 SHIGA-LIKE TOXIN AG IA: CPT | Mod: 59

## 2019-12-09 PROCEDURE — 87209 SMEAR COMPLEX STAIN: CPT

## 2019-12-09 PROCEDURE — 87045 FECES CULTURE AEROBIC BACT: CPT

## 2019-12-09 PROCEDURE — 99214 OFFICE O/P EST MOD 30 MIN: CPT | Mod: S$GLB,,, | Performed by: NURSE PRACTITIONER

## 2019-12-09 PROCEDURE — 99999 PR PBB SHADOW E&M-EST. PATIENT-LVL III: ICD-10-PCS | Mod: PBBFAC,,, | Performed by: NURSE PRACTITIONER

## 2019-12-09 PROCEDURE — 85651 RBC SED RATE NONAUTOMATED: CPT | Mod: PO

## 2019-12-09 PROCEDURE — 87046 STOOL CULTR AEROBIC BACT EA: CPT

## 2019-12-09 PROCEDURE — 86140 C-REACTIVE PROTEIN: CPT

## 2019-12-09 PROCEDURE — 36415 COLL VENOUS BLD VENIPUNCTURE: CPT | Mod: PO

## 2019-12-09 PROCEDURE — 85025 COMPLETE CBC W/AUTO DIFF WBC: CPT | Mod: PO

## 2019-12-09 NOTE — PROGRESS NOTES
"Subjective:       Patient ID: Eve Kuhn is a 29 y.o. female.    Chief Complaint: Abdominal Pain (Symptoms started last Tuesday); Diarrhea; Headache; Nausea (vomiting); and Generalized Body Aches    5 days ago - vomiting and nausea for 24 hrs - which stopped. - she did eat windy tacos and wonders if this caused it     3 days ago with headache and migraine and neck pain   2 days ago - severe abd pain and nausea and vomiting an diarrhea for 48 hrs.  Still headache - dull occipital headache, left sided    She also reports that since June - she has had increased myalgia and arthralgia  To various joints.     She went to the ortho and they want her to see RHEUM and they have her appt for April   She also mentions that she has a "sunburn rash" to her nose and cheeks occasionally - it comes and goes   Will repeat labs today     Abdominal Pain   This is a new problem. The current episode started in the past 7 days. The problem occurs daily. The problem has been unchanged. The pain is located in the generalized abdominal region. The quality of the pain is aching and cramping. Associated symptoms include anorexia, diarrhea, headaches, nausea and vomiting. Pertinent negatives include no arthralgias, belching, constipation, dysuria, fever, flatus, frequency, hematochezia, hematuria, melena, myalgias or weight loss. The pain is aggravated by eating and movement. The pain is relieved by being still.   Diarrhea    This is a new problem. The current episode started in the past 7 days. The problem occurs 5 to 10 times per day. The problem has been gradually worsening. The stool consistency is described as watery. Associated symptoms include abdominal pain, bloating, headaches and vomiting. Pertinent negatives include no arthralgias, chills, coughing, fever, increased  flatus, myalgias, sweats, URI or weight loss.   Nausea   This is a new problem. The current episode started in the past 7 days. The problem occurs every " several days. The problem has been rapidly worsening. Associated symptoms include abdominal pain, anorexia, a change in bowel habit, fatigue, headaches, nausea and vomiting. Pertinent negatives include no arthralgias, chest pain, chills, congestion, coughing, diaphoresis, fever, joint swelling, myalgias, neck pain, numbness, rash, sore throat, swollen glands, urinary symptoms, vertigo, visual change or weakness.     Vitals:    12/09/19 0737   BP: 104/72   Pulse: 85   Temp: 98.3 °F (36.8 °C)     Review of Systems   Constitutional: Positive for activity change, appetite change and fatigue. Negative for chills, diaphoresis, fever and weight loss.   HENT: Negative.  Negative for congestion, drooling, ear discharge, facial swelling, nosebleeds, postnasal drip, rhinorrhea, sinus pressure, sinus pain and sore throat.    Eyes: Negative.  Negative for discharge, redness and itching.   Respiratory: Negative.  Negative for apnea, cough, choking, chest tightness, shortness of breath and wheezing.    Cardiovascular: Negative.  Negative for chest pain and leg swelling.   Gastrointestinal: Positive for abdominal pain, anorexia, bloating, change in bowel habit, diarrhea, nausea and vomiting. Negative for abdominal distention, anal bleeding, constipation, flatus, hematochezia and melena.   Endocrine: Negative.  Negative for cold intolerance, heat intolerance and polydipsia.   Genitourinary: Negative.  Negative for decreased urine volume, difficulty urinating, dysuria, flank pain, frequency, hematuria, pelvic pain and vaginal bleeding.   Musculoskeletal: Negative.  Negative for arthralgias, gait problem, joint swelling, myalgias and neck pain.   Skin: Negative.  Negative for color change and rash.   Allergic/Immunologic: Negative.  Negative for environmental allergies and food allergies.   Neurological: Positive for headaches. Negative for dizziness, vertigo, speech difficulty, weakness and numbness.   Hematological: Negative.   Negative for adenopathy.   Psychiatric/Behavioral: Negative.  Negative for behavioral problems, confusion, hallucinations and self-injury. The patient is not hyperactive.        Past Medical History:   Diagnosis Date    Abnormal Pap smear     Androgenic alopecia     Chronic rhinitis     Hair loss 1/30/2017    Headache(784.0)     IBS (irritable bowel syndrome)        Objective:      Physical Exam   Constitutional: She is oriented to person, place, and time. She appears well-developed and well-nourished.   HENT:   Head: Normocephalic and atraumatic.       Right Ear: Hearing, tympanic membrane, external ear and ear canal normal.   Left Ear: Hearing, tympanic membrane, external ear and ear canal normal.   Nose: Nose normal. No mucosal edema, rhinorrhea or sinus tenderness. Right sinus exhibits no maxillary sinus tenderness and no frontal sinus tenderness. Left sinus exhibits no maxillary sinus tenderness and no frontal sinus tenderness.   Mouth/Throat: Uvula is midline and mucous membranes are normal. Posterior oropharyngeal erythema present. No oropharyngeal exudate or posterior oropharyngeal edema.   Eyes: Pupils are equal, round, and reactive to light. Conjunctivae and EOM are normal.   Neck: Normal range of motion. Neck supple.   Cardiovascular: Normal rate, regular rhythm, normal heart sounds and intact distal pulses. Exam reveals no friction rub.   No murmur heard.  Pulmonary/Chest: Effort normal and breath sounds normal. No stridor. No respiratory distress. She has no wheezes. She has no rales.   Abdominal: Soft. Bowel sounds are decreased. There is generalized tenderness.   Musculoskeletal: She exhibits no edema.        Right knee: Tenderness found.        Left knee: Tenderness found.        Cervical back: She exhibits pain and spasm. She exhibits normal range of motion.   Neurological: She is alert and oriented to person, place, and time. No cranial nerve deficit. Coordination normal.   Skin: Skin is warm  and dry.   Psychiatric: She has a normal mood and affect. Her behavior is normal. Judgment and thought content normal.   Nursing note and vitals reviewed.      Assessment:       1. Arthralgia, unspecified joint    2. Rash    3. Nausea and vomiting, intractability of vomiting not specified, unspecified vomiting type    4. Abdominal cramping    5. Diarrhea, unspecified type    6. Elevated C-reactive protein (CRP)    7. Vitamin D deficiency        Plan:       Arthralgia, unspecified joint  -     ELIER Screen w/Reflex; Future; Expected date: 12/09/2019  -     Sedimentation rate; Future; Expected date: 12/09/2019  -     C-reactive protein; Future; Expected date: 12/09/2019  -     Rheumatoid factor; Future; Expected date: 12/09/2019    Rash  -     ELIER Screen w/Reflex; Future; Expected date: 12/09/2019  -     Sedimentation rate; Future; Expected date: 12/09/2019  -     C-reactive protein; Future; Expected date: 12/09/2019  -     Rheumatoid factor; Future; Expected date: 12/09/2019    Nausea and vomiting, intractability of vomiting not specified, unspecified vomiting type  She has zofran at home     Abdominal cramping  -     Comprehensive metabolic panel; Future; Expected date: 12/09/2019  -     CBC auto differential; Future; Expected date: 12/09/2019  -     Stool Exam-Ova,Cysts,Parasites; Future; Expected date: 12/09/2019  -     Stool culture; Future; Expected date: 12/09/2019  -     Clostridium difficile EIA; Future; Expected date: 12/09/2019    Diarrhea, unspecified type  -     Comprehensive metabolic panel; Future; Expected date: 12/09/2019  -     CBC auto differential; Future; Expected date: 12/09/2019  -     Stool Exam-Ova,Cysts,Parasites; Future; Expected date: 12/09/2019  -     Stool culture; Future; Expected date: 12/09/2019  -     Clostridium difficile EIA; Future; Expected date: 12/09/2019    Elevated C-reactive protein (CRP)  -     ELIER Screen w/Reflex; Future; Expected date: 12/09/2019  -     Sedimentation rate;  Future; Expected date: 12/09/2019  -     C-reactive protein; Future; Expected date: 12/09/2019  -     Rheumatoid factor; Future; Expected date: 12/09/2019    Vitamin D deficiency  -     Vitamin D; Future; Expected date: 12/09/2019          Will call with labs and needs Stool for check - may be related to the food she ate this week.

## 2019-12-10 LAB — ANA SER QL IF: NORMAL

## 2019-12-11 ENCOUNTER — TELEPHONE (OUTPATIENT)
Dept: FAMILY MEDICINE | Facility: CLINIC | Age: 29
End: 2019-12-11

## 2019-12-11 ENCOUNTER — PATIENT MESSAGE (OUTPATIENT)
Dept: FAMILY MEDICINE | Facility: CLINIC | Age: 29
End: 2019-12-11

## 2019-12-11 LAB
E COLI SXT1 STL QL IA: NEGATIVE
E COLI SXT2 STL QL IA: NEGATIVE
O+P STL TRI STN: NORMAL

## 2019-12-11 NOTE — TELEPHONE ENCOUNTER
----- Message from Leann Marie sent at 12/11/2019  9:55 AM CST -----  Regarding: Lab Client Services  Contact: [phone]  Good Morning,  My name is Leann Marie I work in the lab. We received a specimen for Clostridium Difficile test. The test was unable to be performed due to the stool sample was formed stool. If have any questions regarding this, please contact Lab Client Services at [phone].   Thank you    
That is fine  
c diff was not able to be tested due to sample was formed stool. Please advise.   
Yes

## 2019-12-13 LAB — BACTERIA STL CULT: NORMAL

## 2020-01-10 ENCOUNTER — TELEPHONE (OUTPATIENT)
Dept: RHEUMATOLOGY | Facility: CLINIC | Age: 30
End: 2020-01-10

## 2020-01-10 NOTE — TELEPHONE ENCOUNTER
----- Message from Mary Carmen Levy sent at 1/10/2020  3:35 PM CST -----  Contact: pt  Pt is returning nurse Brunson phone call...[phone] (home)

## 2020-01-10 NOTE — TELEPHONE ENCOUNTER
Returned patient call regarding April appointment. Rescheduled appointment patient aware of new date and time of appoinment.

## 2020-01-13 ENCOUNTER — TELEPHONE (OUTPATIENT)
Dept: NEUROLOGY | Facility: CLINIC | Age: 30
End: 2020-01-13

## 2020-01-13 ENCOUNTER — PATIENT MESSAGE (OUTPATIENT)
Dept: NEUROLOGY | Facility: CLINIC | Age: 30
End: 2020-01-13

## 2020-01-13 NOTE — TELEPHONE ENCOUNTER
----- Message from Kimberly Byrne sent at 1/13/2020  7:29 AM CST -----  Contact: patient  Type: Needs Medical Advice    Who Called: Patient  Symptoms (please be specific):  Pinched nerve  How long has patient had these symptoms:    Pharmacy name and phone #:    Best Call Back Number: [phone]  Additional Information: requesting a call back to schedule appointment

## 2020-01-13 NOTE — TELEPHONE ENCOUNTER
----- Message from Kimberly Byrne sent at 1/13/2020  7:29 AM CST -----  Contact: patient  Type: Needs Medical Advice    Who Called: Patient  Symptoms (please be specific):  Pinched nerve  How long has patient had these symptoms:    Pharmacy name and phone #:    Best Call Back Number: [phone]  Additional Information: requesting a call back to schedule appointment

## 2020-01-15 ENCOUNTER — HOSPITAL ENCOUNTER (OUTPATIENT)
Dept: RADIOLOGY | Facility: HOSPITAL | Age: 30
Discharge: HOME | End: 2020-01-15
Attending: NURSE PRACTITIONER
Payer: COMMERCIAL

## 2020-01-15 ENCOUNTER — OFFICE VISIT (OUTPATIENT)
Dept: FAMILY MEDICINE | Facility: CLINIC | Age: 30
End: 2020-01-15
Payer: COMMERCIAL

## 2020-01-15 VITALS
OXYGEN SATURATION: 98 % | TEMPERATURE: 98 F | WEIGHT: 147.94 LBS | DIASTOLIC BLOOD PRESSURE: 78 MMHG | HEART RATE: 87 BPM | HEIGHT: 67 IN | BODY MASS INDEX: 23.22 KG/M2 | SYSTOLIC BLOOD PRESSURE: 110 MMHG

## 2020-01-15 DIAGNOSIS — M54.2 NECK PAIN: ICD-10-CM

## 2020-01-15 DIAGNOSIS — R09.82 PND (POST-NASAL DRIP): ICD-10-CM

## 2020-01-15 DIAGNOSIS — R20.0 LEFT ARM NUMBNESS: ICD-10-CM

## 2020-01-15 DIAGNOSIS — M54.81 OCCIPITAL NEURALGIA OF LEFT SIDE: ICD-10-CM

## 2020-01-15 DIAGNOSIS — R51.9 OCCIPITAL PAIN: ICD-10-CM

## 2020-01-15 DIAGNOSIS — J04.0 LARYNGITIS: Primary | ICD-10-CM

## 2020-01-15 PROBLEM — O14.90 PREECLAMPSIA: Status: RESOLVED | Noted: 2019-06-13 | Resolved: 2020-01-15

## 2020-01-15 PROBLEM — O36.8190 DECREASED FETAL MOVEMENT: Status: RESOLVED | Noted: 2019-04-10 | Resolved: 2020-01-15

## 2020-01-15 PROBLEM — O47.9 IRREGULAR UTERINE CONTRACTIONS: Status: RESOLVED | Noted: 2019-05-07 | Resolved: 2020-01-15

## 2020-01-15 PROCEDURE — 72050 XR CERVICAL SPINE COMPLETE 5 VIEW: ICD-10-PCS | Mod: 26,,, | Performed by: RADIOLOGY

## 2020-01-15 PROCEDURE — 99999 PR PBB SHADOW E&M-EST. PATIENT-LVL III: CPT | Mod: PBBFAC,,, | Performed by: NURSE PRACTITIONER

## 2020-01-15 PROCEDURE — 72050 X-RAY EXAM NECK SPINE 4/5VWS: CPT | Mod: TC,FY,PO

## 2020-01-15 PROCEDURE — 99214 OFFICE O/P EST MOD 30 MIN: CPT | Mod: S$GLB,,, | Performed by: NURSE PRACTITIONER

## 2020-01-15 PROCEDURE — 99214 PR OFFICE/OUTPT VISIT, EST, LEVL IV, 30-39 MIN: ICD-10-PCS | Mod: S$GLB,,, | Performed by: NURSE PRACTITIONER

## 2020-01-15 PROCEDURE — 99999 PR PBB SHADOW E&M-EST. PATIENT-LVL III: ICD-10-PCS | Mod: PBBFAC,,, | Performed by: NURSE PRACTITIONER

## 2020-01-15 PROCEDURE — 72050 X-RAY EXAM NECK SPINE 4/5VWS: CPT | Mod: 26,,, | Performed by: RADIOLOGY

## 2020-01-15 RX ORDER — LORATADINE 10 MG/1
10 TABLET ORAL DAILY
COMMUNITY

## 2020-01-15 NOTE — PROGRESS NOTES
Subjective:       Patient ID: Eve Blackwood is a 29 y.o. female.    Chief Complaint: Sore Throat (loss of voice) and ear drums problem (balateral- sting)    Otalgia    There is pain in both ears. This is a recurrent problem. The current episode started yesterday. The problem has been gradually worsening. There has been no fever. Associated symptoms include headaches, neck pain and a sore throat. Pertinent negatives include no abdominal pain, coughing, diarrhea, ear discharge, hearing loss, rash, rhinorrhea or vomiting. Treatments tried: claritin.   Sore Throat    This is a new problem. The current episode started yesterday. The problem has been gradually worsening. Associated symptoms include ear pain, headaches, a hoarse voice, a plugged ear sensation and neck pain. Pertinent negatives include no abdominal pain, congestion, coughing, diarrhea, drooling, ear discharge, shortness of breath, stridor, swollen glands, trouble swallowing or vomiting.     Vitals:    01/15/20 1103   BP: 110/78   Pulse: 87   Temp: 98.1 °F (36.7 °C)     Review of Systems   Constitutional: Negative.  Negative for activity change, appetite change, chills, diaphoresis, fatigue and fever.   HENT: Positive for ear pain, hoarse voice and sore throat. Negative for congestion, drooling, ear discharge, facial swelling, hearing loss, nosebleeds, postnasal drip, rhinorrhea, sinus pressure, sinus pain and trouble swallowing.    Eyes: Negative.  Negative for discharge, redness and itching.   Respiratory: Negative.  Negative for apnea, cough, choking, chest tightness, shortness of breath, wheezing and stridor.    Cardiovascular: Negative.  Negative for chest pain and leg swelling.   Gastrointestinal: Negative.  Negative for abdominal pain, anal bleeding, constipation, diarrhea, nausea and vomiting.   Endocrine: Negative.  Negative for cold intolerance, heat intolerance and polydipsia.   Genitourinary: Negative.  Negative for decreased urine  volume, difficulty urinating, dysuria, flank pain, hematuria, pelvic pain and vaginal bleeding.   Musculoskeletal: Positive for arthralgias and neck pain. Negative for gait problem, joint swelling and myalgias.   Skin: Negative.  Negative for color change and rash.   Allergic/Immunologic: Negative.  Negative for environmental allergies and food allergies.   Neurological: Positive for headaches. Negative for dizziness, speech difficulty, weakness and numbness.   Hematological: Negative.  Negative for adenopathy.   Psychiatric/Behavioral: Negative.  Negative for behavioral problems, confusion, hallucinations and self-injury. The patient is not hyperactive.    All other systems reviewed and are negative.      Past Medical History:   Diagnosis Date    Abnormal Pap smear     Androgenic alopecia     Chronic rhinitis     Decreased fetal movement 4/10/2019    Hair loss 1/30/2017    Headache(784.0)     IBS (irritable bowel syndrome)     Irregular uterine contractions 5/7/2019    Preeclampsia 6/13/2019       Objective:      Physical Exam   Constitutional: She is oriented to person, place, and time. She appears well-developed and well-nourished.   HENT:   Head: Normocephalic and atraumatic.   Right Ear: Hearing, tympanic membrane, external ear and ear canal normal.   Left Ear: Hearing, tympanic membrane, external ear and ear canal normal.   Nose: Nose normal. No mucosal edema, rhinorrhea or sinus tenderness. Right sinus exhibits no maxillary sinus tenderness and no frontal sinus tenderness. Left sinus exhibits no maxillary sinus tenderness and no frontal sinus tenderness.   Mouth/Throat: Uvula is midline, oropharynx is clear and moist and mucous membranes are normal. No oropharyngeal exudate or posterior oropharyngeal edema.   Eyes: Pupils are equal, round, and reactive to light. Conjunctivae and EOM are normal.   Neck: Normal range of motion. Neck supple.   Cardiovascular: Normal rate, regular rhythm, normal heart  sounds and intact distal pulses. Exam reveals no friction rub.   No murmur heard.  Pulmonary/Chest: Effort normal and breath sounds normal. No stridor. No respiratory distress. She has no wheezes. She has no rales.   Abdominal: Soft. Bowel sounds are normal.   Musculoskeletal: She exhibits no edema.        Cervical back: She exhibits decreased range of motion, tenderness, pain and spasm.        Arms:  Occipital pain and neuralgia  + pain in C6-7 dermatome     Numbness in arm      Neurological: She is alert and oriented to person, place, and time. No cranial nerve deficit. Coordination normal.   Skin: Skin is warm and dry.   Psychiatric: She has a normal mood and affect. Her behavior is normal. Judgment and thought content normal.   Nursing note and vitals reviewed.      Assessment:       1. Laryngitis    2. Neck pain    3. Occipital pain    4. Occipital neuralgia of left side    5. Left arm numbness    6. PND (post-nasal drip)        Plan:       Eve was seen today for sore throat and ear drums problem.    Diagnoses and all orders for this visit:    Laryngitis    Neck pain  -     X-Ray Cervical Spine Complete 5 view; Future  -     MRI Cervical Spine Without Contrast; Future    Occipital pain  -     X-Ray Cervical Spine Complete 5 view; Future  -     MRI Cervical Spine Without Contrast; Future    Occipital neuralgia of left side  -     X-Ray Cervical Spine Complete 5 view; Future  -     MRI Cervical Spine Without Contrast; Future    Left arm numbness  -     X-Ray Cervical Spine Complete 5 view; Future  -     MRI Cervical Spine Without Contrast; Future    PND (post-nasal drip)  OTC meds - claritin, Benadryl and Flonase     Call if not better

## 2020-02-24 ENCOUNTER — OFFICE VISIT (OUTPATIENT)
Dept: RHEUMATOLOGY | Facility: CLINIC | Age: 30
End: 2020-02-24
Payer: COMMERCIAL

## 2020-02-24 ENCOUNTER — LAB VISIT (OUTPATIENT)
Dept: LAB | Facility: HOSPITAL | Age: 30
End: 2020-02-24
Attending: INTERNAL MEDICINE
Payer: COMMERCIAL

## 2020-02-24 VITALS
DIASTOLIC BLOOD PRESSURE: 80 MMHG | HEIGHT: 67 IN | BODY MASS INDEX: 23.74 KG/M2 | SYSTOLIC BLOOD PRESSURE: 115 MMHG | WEIGHT: 151.25 LBS | HEART RATE: 85 BPM

## 2020-02-24 DIAGNOSIS — L65.9 HAIR LOSS: Primary | ICD-10-CM

## 2020-02-24 DIAGNOSIS — M94.0 COSTOCHONDRITIS: ICD-10-CM

## 2020-02-24 DIAGNOSIS — K58.9 IRRITABLE BOWEL SYNDROME, UNSPECIFIED TYPE: ICD-10-CM

## 2020-02-24 DIAGNOSIS — M02.30 REACTIVE ARTHRITIS: ICD-10-CM

## 2020-02-24 DIAGNOSIS — B34.9 VIREMIA: ICD-10-CM

## 2020-02-24 DIAGNOSIS — L65.9 HAIR LOSS: ICD-10-CM

## 2020-02-24 PROBLEM — R10.9 ABDOMINAL CRAMPING AFFECTING PREGNANCY: Status: RESOLVED | Noted: 2019-04-26 | Resolved: 2020-02-24

## 2020-02-24 PROBLEM — O26.899 ABDOMINAL CRAMPING AFFECTING PREGNANCY: Status: RESOLVED | Noted: 2019-04-26 | Resolved: 2020-02-24

## 2020-02-24 LAB
CRP SERPL-MCNC: 11.4 MG/L (ref 0–8.2)
ERYTHROCYTE [SEDIMENTATION RATE] IN BLOOD BY WESTERGREN METHOD: 8 MM/HR (ref 0–20)
T3FREE SERPL-MCNC: 3.7 PG/ML (ref 2.3–4.2)
T4 FREE SERPL-MCNC: 1.05 NG/DL (ref 0.71–1.51)
THYROGLOB AB SERPL IA-ACNC: <4 IU/ML (ref 0–3.9)
THYROPEROXIDASE IGG SERPL-ACNC: <6 IU/ML
TSH SERPL DL<=0.005 MIU/L-ACNC: 1.12 UIU/ML (ref 0.4–4)

## 2020-02-24 PROCEDURE — 84443 ASSAY THYROID STIM HORMONE: CPT

## 2020-02-24 PROCEDURE — 86235 NUCLEAR ANTIGEN ANTIBODY: CPT | Mod: 59

## 2020-02-24 PROCEDURE — 86200 CCP ANTIBODY: CPT

## 2020-02-24 PROCEDURE — 99999 PR PBB SHADOW E&M-EST. PATIENT-LVL III: CPT | Mod: PBBFAC,,, | Performed by: INTERNAL MEDICINE

## 2020-02-24 PROCEDURE — 99205 OFFICE O/P NEW HI 60 MIN: CPT | Mod: 25,S$GLB,, | Performed by: INTERNAL MEDICINE

## 2020-02-24 PROCEDURE — 86765 RUBEOLA ANTIBODY: CPT

## 2020-02-24 PROCEDURE — 86225 DNA ANTIBODY NATIVE: CPT

## 2020-02-24 PROCEDURE — 96372 PR INJECTION,THERAP/PROPH/DIAG2ST, IM OR SUBCUT: ICD-10-PCS | Mod: S$GLB,,, | Performed by: INTERNAL MEDICINE

## 2020-02-24 PROCEDURE — 86140 C-REACTIVE PROTEIN: CPT

## 2020-02-24 PROCEDURE — 86765 RUBEOLA ANTIBODY: CPT | Mod: 91

## 2020-02-24 PROCEDURE — 86645 CMV ANTIBODY IGM: CPT

## 2020-02-24 PROCEDURE — 86235 NUCLEAR ANTIGEN ANTIBODY: CPT

## 2020-02-24 PROCEDURE — 86038 ANTINUCLEAR ANTIBODIES: CPT

## 2020-02-24 PROCEDURE — 86664 EPSTEIN-BARR NUCLEAR ANTIGEN: CPT

## 2020-02-24 PROCEDURE — 84439 ASSAY OF FREE THYROXINE: CPT

## 2020-02-24 PROCEDURE — 86376 MICROSOMAL ANTIBODY EACH: CPT

## 2020-02-24 PROCEDURE — 86644 CMV ANTIBODY: CPT

## 2020-02-24 PROCEDURE — 81220 CFTR GENE COM VARIANTS: CPT

## 2020-02-24 PROCEDURE — 99999 PR PBB SHADOW E&M-EST. PATIENT-LVL III: ICD-10-PCS | Mod: PBBFAC,,, | Performed by: INTERNAL MEDICINE

## 2020-02-24 PROCEDURE — 83516 IMMUNOASSAY NONANTIBODY: CPT

## 2020-02-24 PROCEDURE — 96372 THER/PROPH/DIAG INJ SC/IM: CPT | Mod: S$GLB,,, | Performed by: INTERNAL MEDICINE

## 2020-02-24 PROCEDURE — 81374 HLA I TYPING 1 ANTIGEN LR: CPT | Mod: PO

## 2020-02-24 PROCEDURE — 86800 THYROGLOBULIN ANTIBODY: CPT

## 2020-02-24 PROCEDURE — 99205 PR OFFICE/OUTPT VISIT, NEW, LEVL V, 60-74 MIN: ICD-10-PCS | Mod: 25,S$GLB,, | Performed by: INTERNAL MEDICINE

## 2020-02-24 PROCEDURE — 84481 FREE ASSAY (FT-3): CPT

## 2020-02-24 PROCEDURE — 81375 HLA II TYPING AG EQUIV LR: CPT | Mod: PO

## 2020-02-24 PROCEDURE — 81382 HLA II TYPING 1 LOC HR: CPT | Mod: PO

## 2020-02-24 PROCEDURE — 85651 RBC SED RATE NONAUTOMATED: CPT | Mod: PO

## 2020-02-24 RX ORDER — METHYLPREDNISOLONE ACETATE 80 MG/ML
80 INJECTION, SUSPENSION INTRA-ARTICULAR; INTRALESIONAL; INTRAMUSCULAR; SOFT TISSUE
Status: COMPLETED | OUTPATIENT
Start: 2020-02-24 | End: 2020-02-24

## 2020-02-24 RX ORDER — DIPHENHYDRAMINE HCL 25 MG
25 CAPSULE ORAL NIGHTLY PRN
COMMUNITY

## 2020-02-24 RX ORDER — KETOROLAC TROMETHAMINE 30 MG/ML
60 INJECTION, SOLUTION INTRAMUSCULAR; INTRAVENOUS
Status: COMPLETED | OUTPATIENT
Start: 2020-02-24 | End: 2020-02-24

## 2020-02-24 RX ADMIN — METHYLPREDNISOLONE ACETATE 80 MG: 80 INJECTION, SUSPENSION INTRA-ARTICULAR; INTRALESIONAL; INTRAMUSCULAR; SOFT TISSUE at 11:02

## 2020-02-24 RX ADMIN — KETOROLAC TROMETHAMINE 60 MG: 30 INJECTION, SOLUTION INTRAMUSCULAR; INTRAVENOUS at 11:02

## 2020-02-24 ASSESSMENT — ROUTINE ASSESSMENT OF PATIENT INDEX DATA (RAPID3)
PATIENT GLOBAL ASSESSMENT SCORE: 5
PAIN SCORE: 7
MDHAQ FUNCTION SCORE: .7
PSYCHOLOGICAL DISTRESS SCORE: 1.1
TOTAL RAPID3 SCORE: 4.78

## 2020-02-24 NOTE — PROGRESS NOTES
Subjective:       Patient ID: Eve Blackwood is a 29 y.o. female.    Chief Complaint: Disease Management    HPI: pt is a 30 yo with a h/o diffuse rash prior to viral infection in her  3rd trimester of her second baby then had an emergency c section for preeclampsia, she since had   Flares since hands c spine hips , knees and feet. She can not see swelling but throbbing, last 1-4 days , fatigue and flu like symptoms, she has a highschool h/o ibs, and alopecia but not bald spots, weekly flare, no h/o mono, never blood pressure elevation just protein in her urine. Great aunt RA grandmother  Thyroid dz.h/o allergies, medication allergies, first baby with downs  has an IGG defiency. Her last baby has multiple infections    Review of Systems   Constitutional: Positive for activity change and fatigue. Negative for appetite change, chills, diaphoresis, fever and unexpected weight change.   HENT: Negative for congestion, dental problem, ear discharge, ear pain, facial swelling, mouth sores, nosebleeds, postnasal drip, rhinorrhea, sinus pressure, sneezing, sore throat, tinnitus, trouble swallowing and voice change.    Eyes: Negative for photophobia, pain, discharge, redness and itching.   Respiratory: Negative for apnea, cough, chest tightness, shortness of breath and wheezing.    Cardiovascular: Positive for leg swelling. Negative for chest pain and palpitations.   Gastrointestinal: Positive for abdominal pain. Negative for abdominal distention, constipation, diarrhea, nausea and vomiting.   Endocrine: Negative for cold intolerance, heat intolerance, polydipsia and polyuria.   Genitourinary: Negative for decreased urine volume, difficulty urinating, dysuria, flank pain, frequency, hematuria and urgency.   Musculoskeletal: Positive for arthralgias, back pain, gait problem, joint swelling, myalgias, neck pain and neck stiffness.   Skin: Negative for pallor, rash and wound.   Allergic/Immunologic: Negative for  "immunocompromised state.   Neurological: Positive for weakness. Negative for dizziness, tremors, numbness and headaches.   Hematological: Negative for adenopathy. Does not bruise/bleed easily.   Psychiatric/Behavioral: Negative for sleep disturbance. The patient is not nervous/anxious.          Objective:   /80 (BP Location: Right arm, Patient Position: Sitting, BP Method: Medium (Automatic))   Pulse 85   Ht 5' 7" (1.702 m)   Wt 68.6 kg (151 lb 3.8 oz)   LMP 02/14/2020   BMI 23.69 kg/m²      Physical Exam   Vitals reviewed.  Constitutional: She is oriented to person, place, and time. No distress.   HENT:   Head: Normocephalic and atraumatic.   Eyes: EOM are normal. Pupils are equal, round, and reactive to light. Right eye exhibits no discharge. Left eye exhibits no discharge.   Neck: Neck supple. No thyromegaly present.   Cardiovascular: Normal rate, regular rhythm and normal heart sounds.  Exam reveals no gallop and no friction rub.    No murmur heard.  Pulmonary/Chest: Breath sounds normal. She has no wheezes. She has no rales. She exhibits no tenderness.   Abdominal: There is no tenderness. There is no rebound and no guarding.       Right Side Rheumatological Exam     Examination finds the shoulder and elbow normal.    The patient is tender to palpation of the wrist, knee, 1st PIP, 1st MCP, 2nd PIP, 2nd MCP, 3rd PIP, 3rd MCP, 4th PIP, 4th MCP, 5th PIP and 5th MCP    She has swelling of the knee    Shoulder Exam   Tenderness Location: no tenderness    Range of Motion   Active abduction: normal   Adduction: normal  Sensation: normal    Knee Exam   Patellofemoral Crepitus: positive  Effusion: positive  Sensation: normal    Hip Exam   Tenderness Location: posterior  Sensation: normal    Elbow/Wrist Exam   Tenderness Location: no tenderness  Sensation: normal    Left Side Rheumatological Exam     The patient is tender to palpation of the shoulder, elbow, wrist, knee, 1st PIP, 1st MCP, 2nd PIP, 2nd MCP, 3rd " PIP, 3rd MCP, 4th PIP, 4th MCP, 5th PIP and 5th MCP.    She has swelling of the knee    Shoulder Exam   Tenderness Location: no tenderness    Range of Motion   Active abduction: normal   Sensation: normal    Knee Exam     Patellofemoral Crepitus: positive  Effusion: positive  Sensation: normal    Hip Exam   Tenderness Location: posterior  Sensation: normal    Elbow/Wrist Exam   Sensation: normal      Back/Neck Exam   General Inspection   Gait: normal         Lymphadenopathy:     She has no cervical adenopathy.   Neurological: She is alert and oriented to person, place, and time. Gait normal.   Skin: Skin is dry. No rash noted. No erythema. There is pallor.     Psychiatric: Mood and affect normal.   Musculoskeletal: She exhibits tenderness. She exhibits no deformity.        IgG 1 382 - 929 mg/dL 540 549 IgG 2 242 - 700 mg/dL 377 382 IgG 3 22 - 176 mg/dL 193High  164 IgG 4 4 - 86 mg/dL 37 40 CM Comment: Test performed at [facility],   Sed Rate 0 - 20 mm/Hr 37High  Resulting Agency [facility]   Parvovirus B19 IgG Negative PositiveAbnormal  Parvovirus B19 IgM Negative Negative Parvovirus B19 Abs IgG & IgM Results suggest past infection. Comment: -------------------ADDITIONAL INFORMATION-------------------   ELIER ScreenNegative <1:160Negative <1:160   CRP0.0 - 8.2 mg/L8.6High  64.0High  [facility]ing [facility]        Assessment:       1. Hair loss    2. Irritable bowel syndrome, unspecified type    3. Costochondritis    4. Reactive arthritis    5. Viremia            Plan:       Eve was seen today for disease management.    Diagnoses and all orders for this visit:    Hair loss  -     ELIER Screen w/Reflex; Future  -     Anti Sm/RNP Antibody; Future  -     Anti-DNA antibody, double-stranded; Future  -     Anti-Histone Antibody; Future  -     Anti-scleroderma antibody; Future  -     Anti-Smith antibody; Future  -     Anti-thyroglobulin antibody; Future  -     Sedimentation rate; Future  -     Sjogrens syndrome-A  extractable nuclear antibody; Future  -     Sjogrens syndrome-B extractable nuclear antibody; Future  -     Cyclic citrul peptide antibody, IgG; Future  -     Thyroid peroxidase antibody; Future  -     T4, free; Future  -     T3, free; Future  -     TSH; Future  -     HLA CELIAC CL2; Future  -     Cytomegalovirus (Cmv) Ab, Igm; Future  -     Cytomegalovirus antibody, IgG; Future  -     FRANNIE-BARR VIRUS ANTIBODY PANEL; Future  -     Urinalysis; Future  -     Cystic Fibrosis Mutation Panel; Future  -     RUBEOLA ANTIBODY IGG; Future  -     RUBEOLA ANTIBODY, IGM; Future  -     C-reactive protein; Future  -     methylPREDNISolone acetate injection 80 mg  -     ketorolac injection 60 mg  -     HLA B27 Antigen; Future    Irritable bowel syndrome, unspecified type  -     ELIER Screen w/Reflex; Future  -     Anti Sm/RNP Antibody; Future  -     Anti-DNA antibody, double-stranded; Future  -     Anti-Histone Antibody; Future  -     Anti-scleroderma antibody; Future  -     Anti-Smith antibody; Future  -     Anti-thyroglobulin antibody; Future  -     Sedimentation rate; Future  -     Sjogrens syndrome-A extractable nuclear antibody; Future  -     Sjogrens syndrome-B extractable nuclear antibody; Future  -     Cyclic citrul peptide antibody, IgG; Future  -     Thyroid peroxidase antibody; Future  -     T4, free; Future  -     T3, free; Future  -     TSH; Future  -     HLA CELIAC CL2; Future  -     Cytomegalovirus (Cmv) Ab, Igm; Future  -     Cytomegalovirus antibody, IgG; Future  -     FRANNIE-BARR VIRUS ANTIBODY PANEL; Future  -     Urinalysis; Future  -     Cystic Fibrosis Mutation Panel; Future  -     RUBEOLA ANTIBODY IGG; Future  -     RUBEOLA ANTIBODY, IGM; Future  -     C-reactive protein; Future  -     methylPREDNISolone acetate injection 80 mg  -     ketorolac injection 60 mg  -     HLA B27 Antigen; Future    Costochondritis  -     ELIER Screen w/Reflex; Future  -     Anti Sm/RNP Antibody; Future  -     Anti-DNA antibody,  double-stranded; Future  -     Anti-Histone Antibody; Future  -     Anti-scleroderma antibody; Future  -     Anti-Smith antibody; Future  -     Anti-thyroglobulin antibody; Future  -     Sedimentation rate; Future  -     Sjogrens syndrome-A extractable nuclear antibody; Future  -     Sjogrens syndrome-B extractable nuclear antibody; Future  -     Cyclic citrul peptide antibody, IgG; Future  -     Thyroid peroxidase antibody; Future  -     T4, free; Future  -     T3, free; Future  -     TSH; Future  -     HLA CELIAC CL2; Future  -     Cytomegalovirus (Cmv) Ab, Igm; Future  -     Cytomegalovirus antibody, IgG; Future  -     FRANNIE-BARR VIRUS ANTIBODY PANEL; Future  -     Urinalysis; Future  -     Cystic Fibrosis Mutation Panel; Future  -     RUBEOLA ANTIBODY IGG; Future  -     RUBEOLA ANTIBODY, IGM; Future  -     C-reactive protein; Future  -     methylPREDNISolone acetate injection 80 mg  -     ketorolac injection 60 mg  -     HLA B27 Antigen; Future    Reactive arthritis  -     ELIER Screen w/Reflex; Future  -     Anti Sm/RNP Antibody; Future  -     Anti-DNA antibody, double-stranded; Future  -     Anti-Histone Antibody; Future  -     Anti-scleroderma antibody; Future  -     Anti-Smith antibody; Future  -     Anti-thyroglobulin antibody; Future  -     Sedimentation rate; Future  -     Sjogrens syndrome-A extractable nuclear antibody; Future  -     Sjogrens syndrome-B extractable nuclear antibody; Future  -     Cyclic citrul peptide antibody, IgG; Future  -     Thyroid peroxidase antibody; Future  -     T4, free; Future  -     T3, free; Future  -     TSH; Future  -     HLA CELIAC CL2; Future  -     Cytomegalovirus (Cmv) Ab, Igm; Future  -     Cytomegalovirus antibody, IgG; Future  -     FRANNIE-BARR VIRUS ANTIBODY PANEL; Future  -     Urinalysis; Future  -     Cystic Fibrosis Mutation Panel; Future  -     RUBEOLA ANTIBODY IGG; Future  -     RUBEOLA ANTIBODY, IGM; Future  -     C-reactive protein; Future  -      methylPREDNISolone acetate injection 80 mg  -     ketorolac injection 60 mg  -     HLA B27 Antigen; Future    Viremia  -     RUBEOLA ANTIBODY IGG; Future  -     RUBEOLA ANTIBODY, IGM; Future  -     C-reactive protein; Future  -     methylPREDNISolone acetate injection 80 mg  -     ketorolac injection 60 mg  -     HLA B27 Antigen; Future    she had episodic weekly flares, no obvious swelling,  H/o ibs as a teenager, rash in her pregnancy.  More than 50% of the 60 minute encounter was spent face to face counseling the patient regarding current status and future plan of care as well as side of the medications. All questions were answered to patient's satisfaction

## 2020-02-24 NOTE — PROGRESS NOTES
Administered 1 cc ( 80 mg/ml ) of depomedrol to the left upper outer gluteal. Informed of s/s to report verbalized understanding. No adverse reactions noted.    Lot # [id number]  Expiration 03/2021    Administered 2 cc ( 30 mg/ml ) of toradol to the left upper outer gluteal. Informed of s/s to report verbalized understanding. No adverse reactions noted.    Lot # [id number]  Expiration 1 aug 2020

## 2020-02-25 LAB — CCP AB SER IA-ACNC: <0.5 U/ML

## 2020-02-26 LAB
ANA SER QL IF: NORMAL
DSDNA AB SER-ACNC: NORMAL [IU]/ML

## 2020-02-27 LAB
CMV IGM SERPL IA-ACNC: <8 AU/ML
EBV EA IGG SER-ACNC: 9.8 U/ML
EBV NA IGG SER-ACNC: >600 U/ML
EBV VCA IGG SER-ACNC: 81.8 U/ML
EBV VCA IGM SER-ACNC: <10 U/ML
ENA SCL70 AB SER-ACNC: 3 UNITS
RUBEOLA IGG ANTIBODY: 1.62 ISR (ref 0–0.9)
RUBEOLA INTERPRETATION: POSITIVE

## 2020-02-28 LAB
CFTR MUT ANL BLD/T: NORMAL
CMV IGG SERPL QL IA: REACTIVE
MEV IGM SER QL: NEGATIVE

## 2020-02-29 LAB
ANTI SM ANTIBODY: 0.08 RATIO (ref 0–0.99)
ANTI SM/RNP ANTIBODY: 0.28 RATIO (ref 0–0.99)
ANTI-SM INTERPRETATION: NEGATIVE
ANTI-SM/RNP INTERPRETATION: NEGATIVE
ANTI-SSA ANTIBODY: 0.05 RATIO (ref 0–0.99)
ANTI-SSA INTERPRETATION: NEGATIVE
ANTI-SSB ANTIBODY: 0.06 RATIO (ref 0–0.99)
ANTI-SSB INTERPRETATION: NEGATIVE
HISTONE IGG SER IA-ACNC: 0.4 UNITS (ref 0–0.9)

## 2020-03-04 LAB
CELIA INTERPRETATION: NORMAL
CELIA TESTING DATE: NORMAL
HLA DQA1 1: NORMAL
HLA DQA1 2: NORMAL
HLA DRB4 1: NORMAL
HLA HR DQA1: NORMAL
HLA HR DQA2: NORMAL
HLA HR SSP DNA TYPING - DQA INTERPRETATION: NORMAL
HLA-DP 1 SERO. EQUIV: NORMAL
HLA-DP 2 SERO. EQUIV: NORMAL
HLA-DPA1 1: NORMAL
HLA-DPA1 2: NORMAL
HLA-DPB1 1: NORMAL
HLA-DPB1 2: NORMAL
HLA-DQ 1 SERO. EQUIV: 2
HLA-DQ 2 SERO. EQUIV: 9
HLA-DQB1 1: NORMAL
HLA-DQB1 2: NORMAL
HLA-DRB1 1 SERO. EQUIV: NORMAL
HLA-DRB1 1: NORMAL
HLA-DRB1 2 SERO. EQUIV: NORMAL
HLA-DRB1 2: NORMAL
HLA-DRB3 1: NORMAL
HLA-DRB3 2: NORMAL
HLA-DRB345 1 SERO. EQUIV: NORMAL
HLA-DRB345 2 SERO. EQUIV: NORMAL
HLA-DRB4 2: NORMAL
HLA-DRB5 1: NORMAL
HLA-DRB5 2: NORMAL
HRDQA TESTING DATE: NORMAL

## 2020-03-10 LAB
HLA-B27 RELATED AG QL: NEGATIVE
HLA-B27 RELATED AG QL: NORMAL

## 2020-03-11 ENCOUNTER — PATIENT MESSAGE (OUTPATIENT)
Dept: RHEUMATOLOGY | Facility: CLINIC | Age: 30
End: 2020-03-11

## 2020-04-03 ENCOUNTER — OFFICE VISIT (OUTPATIENT)
Dept: RHEUMATOLOGY | Facility: CLINIC | Age: 30
End: 2020-04-03
Payer: COMMERCIAL

## 2020-04-03 DIAGNOSIS — M02.30 REACTIVE ARTHRITIS: Primary | ICD-10-CM

## 2020-04-03 DIAGNOSIS — K90.41 NCGS (NON-CELIAC GLUTEN SENSITIVITY): ICD-10-CM

## 2020-04-03 DIAGNOSIS — R76.8 EPSTEIN-BARR VIRUS SEROPOSITIVITY: ICD-10-CM

## 2020-04-03 DIAGNOSIS — R79.82 CRP ELEVATED: ICD-10-CM

## 2020-04-03 PROCEDURE — 99214 PR OFFICE/OUTPT VISIT, EST, LEVL IV, 30-39 MIN: ICD-10-PCS | Mod: 95,,, | Performed by: INTERNAL MEDICINE

## 2020-04-03 PROCEDURE — 99214 OFFICE O/P EST MOD 30 MIN: CPT | Mod: 95,,, | Performed by: INTERNAL MEDICINE

## 2020-04-03 NOTE — PROGRESS NOTES
Subjective:       Patient ID: Eve Blackwood is a 30 y.o. female.    Chief Complaint: Disease Management and Swelling    Follow up: 30 yo with a h/o diffuse rash prior to viral infection  Reactive arthritis,  NCGS she has hold the gluten out of her diet since the beginning of March and she noted significant improvement with her joint pain and stiffness however if she got at least see a little bit of gluten in her diet she began flare her labs indicated a recent EBV infection and she did have a rash and new onset arthritis in her 3rd trimester of her last pregnancy She did have a history of highschool h/o ibs, and alopecia but not bald spots, weekly flare,. Great aunt RA grandmother  Thyroid dz.h/o allergies, medication allergies, first baby with downs  has an IGG defiency.     Review of Systems   Constitutional: Positive for activity change and fatigue. Negative for appetite change, chills, diaphoresis, fever and unexpected weight change.   HENT: Negative for congestion, dental problem, ear discharge, ear pain, facial swelling, mouth sores, nosebleeds, postnasal drip, rhinorrhea, sinus pressure, sneezing, sore throat, tinnitus, trouble swallowing and voice change.    Eyes: Negative for photophobia, pain, discharge, redness and itching.   Respiratory: Negative for apnea, cough, chest tightness, shortness of breath and wheezing.    Cardiovascular: Positive for leg swelling. Negative for chest pain and palpitations.   Gastrointestinal: Positive for abdominal pain. Negative for abdominal distention, constipation, diarrhea, nausea and vomiting.   Endocrine: Negative for cold intolerance, heat intolerance, polydipsia and polyuria.   Genitourinary: Negative for decreased urine volume, difficulty urinating, dysuria, flank pain, frequency, hematuria and urgency.   Musculoskeletal: Positive for arthralgias, back pain, myalgias, neck pain and neck stiffness. Negative for gait problem and joint swelling.   Skin: Negative  for pallor, rash and wound.   Allergic/Immunologic: Negative for immunocompromised state.   Neurological: Positive for weakness. Negative for dizziness, tremors, numbness and headaches.   Hematological: Negative for adenopathy. Does not bruise/bleed easily.   Psychiatric/Behavioral: Negative for sleep disturbance. The patient is not nervous/anxious.          Objective:   There were no vitals taken for this visit.     Physical Exam   Constitutional: She is oriented to person, place, and time and well-developed, well-nourished, and in no distress.   Neurological: She is alert and oriented to person, place, and time.   Psychiatric: Mood, affect and judgment normal.      Results for orders placed or performed in visit on 02/24/20   ELIER Screen w/Reflex   Result Value Ref Range    ELIER Screen Negative <1:80 Negative <1:80   Anti Sm/RNP Antibody   Result Value Ref Range    Anti Sm/RNP Antibody 0.28 0.00 - 0.99 Ratio    Anti-Sm/RNP Interpretation Negative Negative   Anti-DNA antibody, double-stranded   Result Value Ref Range    ds DNA Ab Negative 1:10 Negative 1:10   Anti-Histone Antibody   Result Value Ref Range    Anti-Histone Antibody 0.4 0.0 - 0.9 Units   Anti-scleroderma antibody   Result Value Ref Range    Scleroderma SCL- 3 <20 UNITS   Anti-Smith antibody   Result Value Ref Range    Anti Sm Antibody 0.08 0.00 - 0.99 Ratio    Anti-Sm Interpretation Negative Negative   Anti-thyroglobulin antibody   Result Value Ref Range    Thyroglobulin Ab Screen <4.0 0.0 - 3.9 IU/mL   Sedimentation rate   Result Value Ref Range    Sed Rate 8 0 - 20 mm/Hr   Sjogrens syndrome-A extractable nuclear antibody   Result Value Ref Range    Anti-SSA Antibody 0.05 0.00 - 0.99 Ratio    Anti-SSA Interpretation Negative Negative   Sjogrens syndrome-B extractable nuclear antibody   Result Value Ref Range    Anti-SSB Antibody 0.06 0.00 - 0.99 Ratio    Anti-SSB Interpretation Negative Negative   Cyclic citrul peptide antibody, IgG   Result Value Ref  Range    CCP Antibodies <0.5 <5.0 U/mL   Thyroid peroxidase antibody   Result Value Ref Range    Thyroperoxidase Antibodies <6.0 <6.0 IU/mL   T4, free   Result Value Ref Range    Free T4 1.05 0.71 - 1.51 ng/dL   T3, free   Result Value Ref Range    T3, Free 3.7 2.3 - 4.2 pg/mL   TSH   Result Value Ref Range    TSH 1.120 0.400 - 4.000 uIU/mL   HLA CELIAC CL2   Result Value Ref Range    RICHA Interpretation       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 She is also  positive for DQ9 and DQA1*02:01. This genotype is associated with an  increased risk of Celiac Disease, with an approximate likelihood of  endomyseal IgA of 9.09% She is negative for DQ8.    Tirso MARTINEZ et al. Stratifying Risk for Celiac Disease in a Large At-Risk  [place] Population by Using HLA Alleles. Clinical Gastroenterology and  Hepatology 2009; 7:[id number]      RICHA Testing Date 02/28/2020 06:56 PM     HLA-DRB1 1 NT     HLA-DRB1 1 Sero. Equiv NT     HLA-DRB1 2 NT     HLA-DRB1 2 Sero. Equiv NT     HLA-DQB1 1 *02     HLA-DQ 1 Sero. Equiv 2     HLA-DQB1 2 *03     HLA-DQ 2 Sero. Equiv 9     HLA-DRB3 1 NT     HLA-DRB3 2 NT     HLA DRB4 1 NT     HLA-DRB4 2 NT     HLA-DRB5 1 NT     HLA-DRB5 2 NT     HLA-YYR391 1 Sero. Equiv NT     HLA-ASZ108 2 Sero. Equiv NT     HLA DQA1 1 NT     HLA DQA1 2 NT     HLA-DPA1 1 NT     HLA-DPA1 2 NT     HLA-DPB1 1 NT     HLA-DPB1 2 NT     HLA-DP 1 Sero. Equiv NT     HLA-DP 2 Sero. Equiv NT    Cytomegalovirus (Cmv) Ab, Igm   Result Value Ref Range    Cytomegalovirus IgM Ab <8.0 <30.0 AU/mL   Cytomegalovirus antibody, IgG   Result Value Ref Range    CMV IgG Interpretation Reactive (A) Non-Reactive   FRANNIE-BARR VIRUS ANTIBODY PANEL   Result Value Ref Range    EBV VCA IgG 81.8 (H) <18.0 U/mL    EBV VCA IgM <10.0 <36.0 U/mL    EBV Early Antigen Ab, IgG 9.8 (H) <9.0 U/mL    EBV Nuclear Ag Ab >600.0 (H) <18.0 U/mL   Cystic Fibrosis Mutation Panel   Result Value Ref Range    Cystic Fibrosis Mutation Pnl  See Below    RUBEOLA ANTIBODY IGG   Result Value Ref Range    Rubeola IgG 1.62 (H) 0.00 - 0.90 ISR    Rubeola Interpretation Positive (A) Negative   RUBEOLA ANTIBODY, IGM   Result Value Ref Range    Rubeola Ab, IgM Negative Negative   C-reactive protein   Result Value Ref Range    CRP 11.4 (H) 0.0 - 8.2 mg/L   HLA B27 Antigen   Result Value Ref Range    B27 Testing Date 03/04/2020 01:53 PM     HLA B27 Result Negative    HLA DQA HIGH RES SSP   Result Value Ref Range    HLA HR SSP DNA Typing - DQA Interpretation       This patient is positive for DQA1*02:01 and *05:01. Please see separate  report for interpretation.      HRDQA Testing Date 03/03/2020 07:52 PM     HLA HR DQA1 *02:01     HLA HR DQA2 *05:01      reviewed labs with patient during this visit          Assessment:       1. Reactive arthritis    2. NCGS (non-celiac gluten sensitivity)    3. CRP elevated    4. Breann-Barr virus seropositivity            Plan:       Eve was seen today for disease management and swelling.    Diagnoses and all orders for this visit:    Reactive arthritis  -     C-Reactive Protein; Future  -     Sedimentation rate; Future  -     IgA; Future  -     IgE; Future  -     IgG; Future  -     IgG 1, 2, 3, and 4; Future  -     IgM; Future    NCGS (non-celiac gluten sensitivity)  -     C-Reactive Protein; Future  -     Sedimentation rate; Future  -     IgA; Future  -     IgE; Future  -     IgG; Future  -     IgG 1, 2, 3, and 4; Future  -     IgM; Future    CRP elevated  -     C-Reactive Protein; Future  -     Sedimentation rate; Future  -     IgA; Future  -     IgE; Future  -     IgG; Future  -     IgG 1, 2, 3, and 4; Future  -     IgM; Future    Breann-Barr virus seropositivity     Our plan is to start Plaquenil if she continues to have flare continue a gluten free diet.  Avoid more aggressive treatments of possible but at this present present time we are unable to start Plaquenil due to the pandemic patient is working from home is  well aware of COVID virus B 19 symptoms and his self-quarantine    The patient location is:  home  The chief complaint leading to consultation is:  Reactive arthritis none celiac gluten hypersensitivity   Visit type: Virtual visit with synchronous audio and video  Total time spent with patient:  25 min  Each patient to whom he or she provides medical services by telemedicine is:  (1) informed of the relationship between the physician and patient and the respective role of any other health care provider with respect to management of the patient; and (2) notified that he or she may decline to receive medical services by telemedicine and may withdraw from such care at any time.

## 2020-05-04 ENCOUNTER — OFFICE VISIT (OUTPATIENT)
Dept: NEUROLOGY | Facility: CLINIC | Age: 30
End: 2020-05-04
Payer: COMMERCIAL

## 2020-05-04 ENCOUNTER — TELEPHONE (OUTPATIENT)
Dept: PHARMACY | Facility: CLINIC | Age: 30
End: 2020-05-04

## 2020-05-04 DIAGNOSIS — G43.009 MIGRAINE WITHOUT AURA AND WITHOUT STATUS MIGRAINOSUS, NOT INTRACTABLE: Primary | ICD-10-CM

## 2020-05-04 DIAGNOSIS — K58.9 IRRITABLE BOWEL SYNDROME, UNSPECIFIED TYPE: ICD-10-CM

## 2020-05-04 PROCEDURE — 99213 PR OFFICE/OUTPT VISIT, EST, LEVL III, 20-29 MIN: ICD-10-PCS | Mod: 95,,, | Performed by: PSYCHIATRY & NEUROLOGY

## 2020-05-04 PROCEDURE — 99213 OFFICE O/P EST LOW 20 MIN: CPT | Mod: 95,,, | Performed by: PSYCHIATRY & NEUROLOGY

## 2020-05-04 RX ORDER — RIZATRIPTAN BENZOATE 10 MG/1
TABLET ORAL
Qty: 9 TABLET | Refills: 6 | Status: SHIPPED | OUTPATIENT
Start: 2020-05-04 | End: 2020-06-02 | Stop reason: SDUPTHER

## 2020-05-04 NOTE — PROGRESS NOTES
Subjective:      The patient location is:   Patient's home  The chief complaint leading to consultation is:  Discussion of medication for migraine headache  Visit type: audiovisual  Total time spent with patient:   25 min  Each patient to whom he or she provides medical services by telemedicine is:  (1) informed of the relationship between the physician and patient and the respective role of any other health care provider with respect to management of the patient; and (2) notified that he or she may decline to receive medical services by telemedicine and may withdraw from such care at any time.    Notes:  See comments below    Patient ID: Eve Blackwood is a 30 y.o. female.    Chief Complaint:  I am having a recurrence of my migraine    The patient states that she did well during her pregnancy and  Really did not have any significant headache difficulties during that pregnancy.  However over the past several months, following the delivery of her child the migraine headache has begun to occur again and now seems to occur at least 1 to 2 times a week with each headache lasting about 3 days.   The headache is usually a right hemicranial pain but she has had occasional migraine on the left as well.  She has associated symptoms of photophobia, phonophobia, and movement intolerance when the headache is very severe.The patient states that even though the headache is lasting 3 days, the intensity of the pain is not such that she is bed-bound and is able to care for her children even though the headache pain is present.  The patient is wishing to get back on preventive medications.    In the past, the patient had been on Topamax which had only been marginally effective for her and did have side effects that were bothersome.  The patient is willing to except a different medication.      For abortive treatment, the patient had tried and failed sumatriptan.  However she did find Maxalt 10 mg tablets to be effective and  terminate there headache within 45 min to an hour.  She is requesting a refill of that prescription.    The patient denies any new neurological symptoms.  She has not had any vision changes.  She denies any difficulty with speech or swallowing.  She denies any weakness, awkwardness, or clumsiness of her extremities.  She denies any change in walking or standing balance.  She denies numbness, tingling, or other paresthesia.          ROS:  GENERAL: NO FEVER, CHILLS, FATIGABILITY OR WEIGHT LOSS.  SKIN: NO RASHES, ITCHING OR CHANGES IN COLOR OR TEXTURE OF SKIN.  HEAD: NO  RECENT HEAD TRAUMA.  EYES: VISUAL ACUITY FINE. NO PHOTOPHOBIA, OCULAR PAIN OR DIPLOPIA.  EARS: DENIES EAR PAIN, DISCHARGE OR VERTIGO.  NOSE: NO LOSS OF SMELL, NO EPISTAXIS OR POSTNASAL DRIP.  MOUTH & THROAT: NO HOARSENESS OR CHANGE IN VOICE. NO EXCESSIVE GUM BLEEDING.  NODES: DENIES SWOLLEN GLANDS.  CHEST: DENIES CLINE, CYANOSIS, WHEEZING, COUGH AND SPUTUM PRODUCTION.  CARDIOVASCULAR: DENIES CHEST PAIN, PND, ORTHOPNEA OR REDUCED EXERCISE TOLERANCE.  ABDOMEN: APPETITE FINE. NO WEIGHT LOSS. DENIES DIARRHEA, ABDOMINAL PAIN, HEMATEMESIS OR BLOOD IN STOOL.  URINARY: NO FLANK PAIN, DYSURIA OR HEMATURIA.  PERIPHERAL VASCULAR: NO CLAUDICATION OR CYANOSIS.  MUSCULOSKELETAL: NO JOINT STIFFNESS OR SWELLING. DENIES BACK PAIN.  NEUROLOGIC: NO HISTORY OF SEIZURES, PARALYSIS, ALTERATION OF GAIT OR COORDINATION.    Past Medical History:   Diagnosis Date    Abnormal Pap smear     Androgenic alopecia     Chronic rhinitis     Decreased fetal movement 4/10/2019    Hair loss 2017    Headache(784.0)     IBS (irritable bowel syndrome)     Irregular uterine contractions 2019    Preeclampsia 2019     Past Surgical History:   Procedure Laterality Date     SECTION       SECTION N/A 2019    Procedure:  SECTION;  Surgeon: Cee Joaquin MD;  Location[facility] L&D;  Service: OB/GYN;  Laterality: N/A;    COLONOSCOPY        Jah: normal findings per patient report    EXTERNAL EAR SURGERY      for cauliflower ear    Reconstructive ankly surgery      TONSILLECTOMY, ADENOIDECTOMY      UPPER GASTROINTESTINAL ENDOSCOPY  2011    Dr. Tyson: normal findings per patient report     Family History   Problem Relation Age of Onset    Migraines Mother     Allergies Mother     Allergic rhinitis Mother     Heart disease Father     Hyperlipidemia Father     Migraines Father     Cancer Father     Allergies Father     Allergic rhinitis Father     Migraines Sister     Heart disease Maternal Grandmother     Allergies Maternal Grandmother     Allergic rhinitis Maternal Grandmother     Heart disease Maternal Grandfather     Allergies Maternal Grandfather     Allergic rhinitis Maternal Grandfather     Allergies Paternal Grandmother     Allergic rhinitis Paternal Grandmother     Allergies Paternal Grandfather     Allergic rhinitis Paternal Grandfather     Colon polyps Other     Angioedema Neg Hx     Asthma Neg Hx     Atopy Neg Hx     Eczema Neg Hx     Immunodeficiency Neg Hx     Rhinitis Neg Hx     Urticaria Neg Hx     Colon cancer Neg Hx     Crohn's disease Neg Hx     Stomach cancer Neg Hx     Ulcerative colitis Neg Hx     Esophageal cancer Neg Hx      Social History     Socioeconomic History    Marital status:      Spouse name: Not on file    Number of children: Not on file    Years of education: Not on file    Highest education level: Not on file   Occupational History    Not on file   Social Needs    Financial resource strain: Not on file    Food insecurity:     Worry: Not on file     Inability: Not on file    Transportation needs:     Medical: Not on file     Non-medical: Not on file   Tobacco Use    Smoking status: Never Smoker    Smokeless tobacco: Never Used   Substance and Sexual Activity    Alcohol use: No     Comment: occasion    Drug use: No    Sexual activity: Yes     Partners: Male    Lifestyle    Physical activity:     Days per week: Not on file     Minutes per session: Not on file    Stress: Not on file   Relationships    Social connections:     Talks on phone: Not on file     Gets together: Not on file     Attends [employer] service: Not on file     Active member of club or organization: Not on file     Attends meetings of clubs or organizations: Not on file     Relationship status: Not on file   Other Topics Concern    Not on file   Social History Narrative    Employed    engaged- wedding in 11/2018; [employer] radiology/nuclear med tech; 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         Objective:     The patient by the tele neurology is alert and oriented.  She does not exhibit any facial asymmetry.  The extraocular muscles are intact in the cardinal directions of gaze.  There was no dysarthria present.  She demonstrates active movement of both upper extremities.      Assessment:   No diagnosis found.    Migraine headache without aura, not intractable      Plan:     1.  Begin Emgality by taking 240 mg subcutaneous the 1st month than 120 mg subcutaneously once a month thereafter.  The patient had previously tried Topamax with marginal benefit but had significant difficulties adhering to the medication schedule and would frequently forget to take the medication.  2.  May utilize Maxalt 10 mg at onset of headache pain for abortive treatment  3.  The patient is instructed to keep an accurate headache diary so that we may adjust medications as needed to control migraine headache  4.  Routine follow-up in 6 months but regular communication through the patient portal is recommended.    This note is generated with speech recognition software and is subject to transcription error and sound alike phrases that may be missed by proofreading.

## 2020-05-06 ENCOUNTER — TELEPHONE (OUTPATIENT)
Dept: PHARMACY | Facility: CLINIC | Age: 30
End: 2020-05-06

## 2020-05-06 ENCOUNTER — OFFICE VISIT (OUTPATIENT)
Dept: FAMILY MEDICINE | Facility: CLINIC | Age: 30
End: 2020-05-06
Payer: COMMERCIAL

## 2020-05-06 DIAGNOSIS — J98.8 VIRAL RESPIRATORY ILLNESS: Primary | ICD-10-CM

## 2020-05-06 DIAGNOSIS — B97.89 VIRAL RESPIRATORY ILLNESS: Primary | ICD-10-CM

## 2020-05-06 LAB — GROUP A STREP, MOLECULAR: NEGATIVE

## 2020-05-06 PROCEDURE — U0002 COVID-19 LAB TEST NON-CDC: HCPCS

## 2020-05-06 PROCEDURE — 99214 OFFICE O/P EST MOD 30 MIN: CPT | Mod: 95,,, | Performed by: INTERNAL MEDICINE

## 2020-05-06 PROCEDURE — 87651 STREP A DNA AMP PROBE: CPT | Mod: PO

## 2020-05-06 PROCEDURE — 99214 PR OFFICE/OUTPT VISIT, EST, LEVL IV, 30-39 MIN: ICD-10-PCS | Mod: 95,,, | Performed by: INTERNAL MEDICINE

## 2020-05-06 NOTE — PROGRESS NOTES
Primary Care Telemedicine Note  The patient location is:  Patient Home   The chief complaint leading to consultation is: Sore throat/myalgias  Total time spent with patient: 15 min    Visit type: Virtual visit with synchronous audio only and video  Each patient to whom he or she provides medical services by telemedicine is:  (1) informed of the relationship between the physician and patient and the respective role of any other health care provider with respect to management of the patient; and (2) notified that he or she may decline to receive medical services by telemedicine and may withdraw from such care at any time.      Assessment/Plan:    Viral respiratory illness  -acute symptoms of sore throat, myalgias, anosmia and mild cough  -symptoms meet criteria for COVID-19 testing, will have done today  -will also obtain rapid strep  -instructions given for self-quarantine until results return. CDC recommendations to prevent spread of infection given  -continue symptomatic treatment for now, including NSAIDs for sore throat. OTC cold/cough if needed    _____________________________________________________________________________________________________________________________________________________    Orders this visit:    Viral respiratory illness  -     COVID-19 Routine Screening  -     POCT Strep A, Molecular      Follow up if symptoms worsen or fail to improve.    Soni Christopher MD  _____________________________________________________________________________________________________________________________________________________    CC: Sore throat/myalgias    HPI:  Patient presents today via virtual visit with complaints concerning for respiratory illness. Patient's son receives IVIG and went for infusion yesterday and accompanied him there. She reports that she was fine during the day but then developed symptoms of dizziness and sore throat once she returned home. Now she reports sweating, myalgias, and  worsened sore throat. She checked her temp this am and it was 99.2. She reports a mild cough yesterday but none so far today. Denies shortness of breath. Does report maybe a decrease in her sense of smell. Denies any nasal or sinus congestion. She denies any known sick contacts, including COVID-19. 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     Denies any other complaints.    Past Medical History:  Past Medical History:   Diagnosis Date    Abnormal Pap smear     Androgenic alopecia     Chronic rhinitis     Decreased fetal movement 4/10/2019    Hair loss 2017    Headache(784.0)     IBS (irritable bowel syndrome)     Irregular uterine contractions 2019    Preeclampsia 2019     Past Surgical History:   Procedure Laterality Date     SECTION       SECTION N/A 2019    Procedure:  SECTION;  Surgeon: Cee Joaquin MD;  Location: [facility] L&D;  Service: OB/GYN;  Laterality: N/A;    COLONOSCOPY      Dr. Tyson: normal findings per patient report    EXTERNAL EAR SURGERY      for cauliflower ear    Reconstructive ankly surgery      TONSILLECTOMY, ADENOIDECTOMY      UPPER GASTROINTESTINAL ENDOSCOPY      Dr. Tyson: normal findings per patient report     Review of patient's allergies indicates:   Allergen Reactions    Vancomycin analogues Swelling and Rash    Ciprofloxacin Swelling    Monistat 1 (tioconazole) [tioconazole] Hives     Social History     Tobacco Use    Smoking status: Never Smoker    Smokeless tobacco: Never Used   Substance Use Topics    Alcohol use: No     Comment: occasion    Drug use: No     Family History   Problem Relation Age of Onset    Migraines Mother     Allergies Mother     Allergic rhinitis Mother     Heart disease Father     Hyperlipidemia Father     Migraines Father     Cancer Father     Allergies Father     Allergic rhinitis Father     Migraines Sister     Heart  disease Maternal Grandmother     Allergies Maternal Grandmother     Allergic rhinitis Maternal Grandmother     Heart disease Maternal Grandfather     Allergies Maternal Grandfather     Allergic rhinitis Maternal Grandfather     Allergies Paternal Grandmother     Allergic rhinitis Paternal Grandmother     Allergies Paternal Grandfather     Allergic rhinitis Paternal Grandfather     Colon polyps Other     Angioedema Neg Hx     Asthma Neg Hx     Atopy Neg Hx     Eczema Neg Hx     Immunodeficiency Neg Hx     Rhinitis Neg Hx     Urticaria Neg Hx     Colon cancer Neg Hx     Crohn's disease Neg Hx     Stomach cancer Neg Hx     Ulcerative colitis Neg Hx     Esophageal cancer Neg Hx      Current Outpatient Medications on File Prior to Visit   Medication Sig Dispense Refill    diphenhydrAMINE (BENADRYL) 25 mg capsule Take 25 mg by mouth nightly as needed.      galcanezumab-gnlm (EMGALITY PEN) 120 mg/mL PnIj Inject 1 pen (120 mg total) into the skin every 28 days. 1 mL 5    [] galcanezumab-gnlm (EMGALITY PEN) 120 mg/mL PnIj Inject 2 pens (240 mg total) into the skin the first month only, then 1 pen (120 mg total) every 28 days. 2 mL 0    loratadine (CLARITIN) 10 mg tablet Take 10 mg by mouth once daily.      rizatriptan (MAXALT) 10 MG tablet Take 1 tab at onset of migraine.  May repeat 1 tab in 2 hours if needed.  Limit to 3 tabs/week. 9 tablet 6     No current facility-administered medications on file prior to visit.        Review of Systems   Constitutional: Positive for chills and fever.   HENT: Positive for sore throat. Negative for congestion, hearing loss and sinus pain.    Eyes: Negative for discharge.   Respiratory: Negative for wheezing.    Cardiovascular: Positive for palpitations. Negative for chest pain.   Gastrointestinal: Positive for diarrhea. Negative for blood in stool, constipation and vomiting.   Genitourinary: Negative for dysuria and hematuria.   Musculoskeletal: Positive  for myalgias.   Neurological: Positive for headaches. Negative for weakness.   Endo/Heme/Allergies: Negative for polydipsia.         Physical Exam   No flowsheet data found.  No flowsheet data found.      No flowsheet data found.    Physical Exam   Constitutional: She is oriented to person, place, and time. She appears well-developed and well-nourished. No distress.   HENT:   Head: Normocephalic and atraumatic.   Mouth/Throat: No oropharyngeal exudate.   Mild oropharyngeal erythema   Eyes: EOM are normal.   Neck: Normal range of motion.   Pulmonary/Chest: Effort normal. No respiratory distress.   Neurological: She is alert and oriented to person, place, and time.   Psychiatric: She has a normal mood and affect. Her behavior is normal.       Answers for HPI/ROS submitted by the patient on 5/6/2020   activity change: Yes  unexpected weight change: No  rhinorrhea: No  trouble swallowing: No  visual disturbance: No  chest tightness: No  polyuria: No  difficulty urinating: No  menstrual problem: No  joint swelling: No  arthralgias: Yes  confusion: No  dysphoric mood: No

## 2020-05-06 NOTE — ASSESSMENT & PLAN NOTE
-acute symptoms of sore throat, myalgias, anosmia and mild cough  -symptoms meet criteria for COVID-19 testing, will have done today  -will also obtain rapid strep  -instructions given for self-quarantine until results return. CDC recommendations to prevent spread of infection given  -continue symptomatic treatment for now, including NSAIDs for sore throat. OTC cold/cough if needed

## 2020-05-06 NOTE — PROGRESS NOTES
Results have been released via [employer]. Please verify that these have been viewed by patient. If not, please call patient with results.    I have sent a message to them with the following interpretation (see below).    I have reviewed your recent rapid strep test. This was negative. The COVID-19 test is still pending.    Please do not hesitate to call or message with any additional questions or concerns.

## 2020-05-06 NOTE — TELEPHONE ENCOUNTER
Initial Emgality consult declined on 2020.  Patient has viewed online video and reports that he  will assist with injections if needed. Emgality 240mg (loading dose) will be shipped on 2020 to arrive at patient's home on 2020 via [employer]. $0.00 copay. Patient intends to start Emgality on  2020. Address confirmed. Confirmed 2 patient identifiers - name and . Therapy Appropriate.

## 2020-05-06 NOTE — TELEPHONE ENCOUNTER
DOCUMENTATION ONLY:     Prior authorization for EMGALITY approved from 5/4/2020 to 8/4/2020.     Case ID# [id number]     Co-pay: $0     Patient Assistance IS NOT required.     Forward to clinical pharmacist for consult & shipment. FLC

## 2020-05-07 LAB — SARS-COV-2 RNA RESP QL NAA+PROBE: NOT DETECTED

## 2020-05-07 NOTE — PROGRESS NOTES
Results have been released via [employer]. Please verify that these have been viewed by patient. If not, please call patient with results.    I have sent a message to them with the following interpretation (see below).    I have reviewed your recent COVID-19 testing.    Your test was NEGATIVE for COVID-19 (coronavirus).  If you still have symptoms, treat with rest, fluids, and over-the-counter medications.  Continue to stay home and practice proper handwashing.Recommend stay home from from work until:  o 24 hours fever-free without the use of fever-reducing medications AND  o Improvement in respiratory symptoms (e.g. cough, shortness of breath)     If your symptoms worsen or if you have any other concerns, please contact [facility] at [phone].     Sincerely,    Soni Christopher MD

## 2020-05-28 ENCOUNTER — TELEPHONE (OUTPATIENT)
Dept: PHARMACY | Facility: CLINIC | Age: 30
End: 2020-05-28

## 2020-06-24 ENCOUNTER — TELEPHONE (OUTPATIENT)
Dept: PHARMACY | Facility: CLINIC | Age: 30
End: 2020-06-24

## 2020-06-24 NOTE — TELEPHONE ENCOUNTER
Refill call regarding Emgality at [facility].  Will prepare for shipment with patient consent on  to arrive .  Copay 0.00.  Patient next injection due .  No sharps needed.  Patient has not started any new medications including OTC drugs. Patient has not had any medication/ dose or instruction changes. No new allergies or side effects reported with this shipment. Medication is being taken as prescribed by physician and properly stored. Two patient identifiers:  and Address verified. Patient has no questions or concerns for [facility].

## 2020-07-17 ENCOUNTER — LAB VISIT (OUTPATIENT)
Dept: LAB | Facility: HOSPITAL | Age: 30
End: 2020-07-17
Attending: INTERNAL MEDICINE
Payer: COMMERCIAL

## 2020-07-17 DIAGNOSIS — M02.30 REACTIVE ARTHRITIS: ICD-10-CM

## 2020-07-17 DIAGNOSIS — K90.41 NCGS (NON-CELIAC GLUTEN SENSITIVITY): ICD-10-CM

## 2020-07-17 DIAGNOSIS — R79.82 CRP ELEVATED: ICD-10-CM

## 2020-07-17 LAB
CRP SERPL-MCNC: 6.7 MG/L (ref 0–8.2)
ERYTHROCYTE [SEDIMENTATION RATE] IN BLOOD BY WESTERGREN METHOD: 20 MM/HR (ref 0–20)
IGA SERPL-MCNC: 167 MG/DL (ref 40–350)
IGE SERPL-ACNC: <35 IU/ML (ref 0–100)
IGG SERPL-MCNC: 1189 MG/DL (ref 650–1600)
IGM SERPL-MCNC: 99 MG/DL (ref 50–300)

## 2020-07-17 PROCEDURE — 85651 RBC SED RATE NONAUTOMATED: CPT | Mod: PO

## 2020-07-17 PROCEDURE — 36415 COLL VENOUS BLD VENIPUNCTURE: CPT | Mod: PO

## 2020-07-17 PROCEDURE — 82787 IGG 1 2 3 OR 4 EACH: CPT | Mod: 59

## 2020-07-17 PROCEDURE — 82784 ASSAY IGA/IGD/IGG/IGM EACH: CPT | Mod: 59

## 2020-07-17 PROCEDURE — 82784 ASSAY IGA/IGD/IGG/IGM EACH: CPT

## 2020-07-17 PROCEDURE — 86140 C-REACTIVE PROTEIN: CPT

## 2020-07-17 PROCEDURE — 82785 ASSAY OF IGE: CPT

## 2020-07-20 LAB
IGG1 SER-MCNC: 530 MG/DL (ref 382–929)
IGG2 SER-MCNC: 456 MG/DL (ref 242–700)
IGG3 SER-MCNC: 185 MG/DL (ref 22–176)
IGG4 SER-MCNC: 34 MG/DL (ref 4–86)

## 2020-07-23 ENCOUNTER — TELEPHONE (OUTPATIENT)
Dept: PHARMACY | Facility: CLINIC | Age: 30
End: 2020-07-23

## 2020-07-24 ENCOUNTER — OFFICE VISIT (OUTPATIENT)
Dept: RHEUMATOLOGY | Facility: CLINIC | Age: 30
End: 2020-07-24
Payer: COMMERCIAL

## 2020-07-24 DIAGNOSIS — K90.41 NCGS (NON-CELIAC GLUTEN SENSITIVITY): ICD-10-CM

## 2020-07-24 DIAGNOSIS — R79.82 CRP ELEVATED: ICD-10-CM

## 2020-07-24 DIAGNOSIS — M02.30 REACTIVE ARTHRITIS: Primary | ICD-10-CM

## 2020-07-24 PROCEDURE — 99213 PR OFFICE/OUTPT VISIT, EST, LEVL III, 20-29 MIN: ICD-10-PCS | Mod: 95,,, | Performed by: PHYSICIAN ASSISTANT

## 2020-07-24 PROCEDURE — 99213 OFFICE O/P EST LOW 20 MIN: CPT | Mod: 95,,, | Performed by: PHYSICIAN ASSISTANT

## 2020-07-24 NOTE — PROGRESS NOTES
The patient location is: home  The chief complaint leading to consultation is: reactive arthritis    Visit type: audiovisual    Face to Face time with patient: 15  20 minutes of total time spent on the encounter, which includes face to face time and non-face to face time preparing to see the patient (eg, review of tests), Obtaining and/or reviewing separately obtained history, Documenting clinical information in the electronic or other health record, Independently interpreting results (not separately reported) and communicating results to the patient/family/caregiver, or Care coordination (not separately reported).   Each patient to whom he or she provides medical services by telemedicine is:  (1) informed of the relationship between the physician and patient and the respective role of any other health care provider with respect to management of the patient; and (2) notified that he or she may decline to receive medical services by telemedicine and may withdraw from such care at any time.    Notes:     Subjective:       Patient ID: Eve Blackwood is a 30 y.o. female.    Chief Complaint: Disease Management    Mrs. Blackwood is a 30 year old female who presents to telemedicine virtual visit for follow up on reactive arthritis. She is a new patient to me. She has been doing quite well since her last visit. She has noticed a significant improvement in her joint pain with changing to gluten free diet.  She previously was suffering with pain in her hands, hips, knees, and feet. She denies arthritis flare since May. No recurrence of rash. She does have intermittent paresthesia in the hands and suffers from occipital neuralgia and chronic migraine followed by Neurology. We reviewed recent labs. Plaquenil was discussed at last visit, but not started yet.       Review of Systems   Constitutional: Negative for activity change, appetite change, chills, fatigue and fever.   Eyes: Negative for visual disturbance.    Respiratory: Negative for cough and shortness of breath.    Cardiovascular: Negative for chest pain, palpitations and leg swelling.   Gastrointestinal: Negative for abdominal pain, constipation, diarrhea, nausea and vomiting.   Musculoskeletal: Negative for arthralgias.   Neurological: Positive for numbness and headaches. Negative for dizziness, weakness and light-headedness.         Objective:   There were no vitals filed for this visit.    Past Medical History:   Diagnosis Date    Abnormal Pap smear     Androgenic alopecia     Chronic rhinitis     Decreased fetal movement 4/10/2019    Hair loss 2017    Headache(784.0)     IBS (irritable bowel syndrome)     Irregular uterine contractions 2019    Preeclampsia 2019     Past Surgical History:   Procedure Laterality Date     SECTION       SECTION N/A 2019    Procedure:  SECTION;  Surgeon: Cee Joaquin MD;  Location: [facility];  Service: OB/GYN;  Laterality: N/A;    COLONOSCOPY      Dr. Tyson: normal findings per patient report    EXTERNAL EAR SURGERY      for cauliflower ear    Reconstructive ankly surgery      TONSILLECTOMY, ADENOIDECTOMY      UPPER GASTROINTESTINAL ENDOSCOPY      Dr. Tyson: normal findings per patient report          Physical Exam   Constitutional: She is oriented to person, place, and time.   Neurological: She is oriented to person, place, and time.       Recent labs reviewed:  Component      Latest Ref Rng & Units 2020   IgG 1      382 - 929 mg/dL 530   IgG 2      242 - 700 mg/dL 456   IgG 3      22 - 176 mg/dL 185 (H)   IgG 4      4 - 86 mg/dL 34   CRP      0.0 - 8.2 mg/L 6.7   Sed Rate      0 - 20 mm/Hr 20   IgA      40 - 350 mg/dL 167   IgE      0 - 100 IU/mL <35   IgG - Serum      650 - 1600 mg/dL 1189   IgM      50 - 300 mg/dL 99     Assessment:       1. Reactive arthritis    2. NCGS (non-celiac gluten sensitivity)    3. CRP elevated            Plan:       Reactive  arthritis  -     CBC auto differential; Future; Expected date: 07/24/2020  -     Comprehensive metabolic panel; Future; Expected date: 07/24/2020  -     C-Reactive Protein; Future; Expected date: 07/24/2020  -     Sedimentation rate; Future; Expected date: 07/24/2020    NCGS (non-celiac gluten sensitivity)  -     CBC auto differential; Future; Expected date: 07/24/2020  -     Comprehensive metabolic panel; Future; Expected date: 07/24/2020  -     C-Reactive Protein; Future; Expected date: 07/24/2020  -     Sedimentation rate; Future; Expected date: 07/24/2020    CRP elevated        Assessment:  30 year old female with  Reactive arthritis, NCGS, elevated CRP/ESR (resolved)    Plan:  1. Cont. Gluten free diet  2. May consider plaquenil in the future if sx return    The patient is aware that there is a COVID-19 pandemic and that the immunosuppressants being taken to treat arthritis can increased the  risk for infection/Viruses.  This patient understands  to hold (not to take) all DMARD/Immunsuppressants with the exception of Plaquenil,  if having fever chills, cough, shortness of breath loss of sense of smell and or myalgias.  It is understood that the patient is to call the office as well as call their primary care physician and observe  social isolation    Follow up:  3 mo Dr. Zelalem young/labs prior

## 2020-08-19 ENCOUNTER — TELEPHONE (OUTPATIENT)
Dept: PHARMACY | Facility: CLINIC | Age: 30
End: 2020-08-19

## 2020-09-16 ENCOUNTER — TELEPHONE (OUTPATIENT)
Dept: PHARMACY | Facility: CLINIC | Age: 30
End: 2020-09-16

## 2020-09-16 ENCOUNTER — OFFICE VISIT (OUTPATIENT)
Dept: FAMILY MEDICINE | Facility: CLINIC | Age: 30
End: 2020-09-16
Payer: COMMERCIAL

## 2020-09-16 VITALS
HEIGHT: 67 IN | HEART RATE: 81 BPM | SYSTOLIC BLOOD PRESSURE: 108 MMHG | BODY MASS INDEX: 23.11 KG/M2 | WEIGHT: 147.25 LBS | TEMPERATURE: 98 F | DIASTOLIC BLOOD PRESSURE: 68 MMHG | RESPIRATION RATE: 18 BRPM

## 2020-09-16 DIAGNOSIS — R10.9 FLANK PAIN: Primary | ICD-10-CM

## 2020-09-16 DIAGNOSIS — R31.29 MICROSCOPIC HEMATURIA: ICD-10-CM

## 2020-09-16 DIAGNOSIS — R10.11 RUQ PAIN: ICD-10-CM

## 2020-09-16 LAB
BILIRUB SERPL-MCNC: NEGATIVE MG/DL
BLOOD URINE, POC: ABNORMAL
CLARITY, POC UA: CLEAR
COLOR, POC UA: YELLOW
GLUCOSE UR QL STRIP: NEGATIVE
KETONES UR QL STRIP: NEGATIVE
LEUKOCYTE ESTERASE URINE, POC: NEGATIVE
NITRITE, POC UA: NEGATIVE
PH, POC UA: 7
PROTEIN, POC: ABNORMAL
SPECIFIC GRAVITY, POC UA: 1.01
UROBILINOGEN, POC UA: 0

## 2020-09-16 PROCEDURE — 99214 PR OFFICE/OUTPT VISIT, EST, LEVL IV, 30-39 MIN: ICD-10-PCS | Mod: 25,S$GLB,, | Performed by: NURSE PRACTITIONER

## 2020-09-16 PROCEDURE — 81002 POCT URINE DIPSTICK WITHOUT MICROSCOPE: ICD-10-PCS | Mod: S$GLB,,, | Performed by: NURSE PRACTITIONER

## 2020-09-16 PROCEDURE — 96372 PR INJECTION,THERAP/PROPH/DIAG2ST, IM OR SUBCUT: ICD-10-PCS | Mod: S$GLB,,, | Performed by: NURSE PRACTITIONER

## 2020-09-16 PROCEDURE — 99214 OFFICE O/P EST MOD 30 MIN: CPT | Mod: 25,S$GLB,, | Performed by: NURSE PRACTITIONER

## 2020-09-16 PROCEDURE — 96372 THER/PROPH/DIAG INJ SC/IM: CPT | Mod: S$GLB,,, | Performed by: NURSE PRACTITIONER

## 2020-09-16 PROCEDURE — 81002 URINALYSIS NONAUTO W/O SCOPE: CPT | Mod: S$GLB,,, | Performed by: NURSE PRACTITIONER

## 2020-09-16 RX ORDER — KETOROLAC TROMETHAMINE 30 MG/ML
60 INJECTION, SOLUTION INTRAMUSCULAR; INTRAVENOUS ONCE
Status: COMPLETED | OUTPATIENT
Start: 2020-09-16 | End: 2020-09-16

## 2020-09-16 RX ADMIN — KETOROLAC TROMETHAMINE 60 MG: 30 INJECTION, SOLUTION INTRAMUSCULAR; INTRAVENOUS at 12:09

## 2020-09-17 ENCOUNTER — PATIENT MESSAGE (OUTPATIENT)
Dept: FAMILY MEDICINE | Facility: CLINIC | Age: 30
End: 2020-09-17

## 2020-09-17 RX ORDER — METHYLPREDNISOLONE 4 MG/1
TABLET ORAL
Qty: 1 PACKAGE | Refills: 0 | Status: ON HOLD | OUTPATIENT
Start: 2020-09-17 | End: 2020-09-28 | Stop reason: CLARIF

## 2020-09-19 NOTE — PROGRESS NOTES
"Subjective:       Patient ID: Eve Blackwood is a 30 y.o. female.    Chief Complaint: Abdominal Pain    Abdominal Pain  This is a new problem. The current episode started yesterday. The onset quality is sudden. The problem occurs constantly. The problem has been unchanged. The pain is located in the RUQ. The quality of the pain is sharp. The abdominal pain radiates to the right shoulder. Pertinent negatives include no anorexia, arthralgias, belching, constipation, diarrhea, dysuria, fever, flatus, frequency, headaches, hematochezia, hematuria, melena, myalgias, nausea, vomiting or weight loss. The pain is aggravated by certain positions.     Review of Systems   Constitutional: Negative for activity change, appetite change, fever and weight loss.   HENT: Negative for congestion, postnasal drip, rhinorrhea and sinus pressure.    Eyes: Negative for pain and redness.   Respiratory: Negative for choking and chest tightness.    Gastrointestinal: Negative for abdominal distention, abdominal pain, anorexia, blood in stool, constipation, diarrhea, flatus, hematochezia, melena, nausea and vomiting.   Endocrine: Negative for polydipsia and polyphagia.   Genitourinary: Negative for dysuria, frequency and hematuria.   Musculoskeletal: Negative for arthralgias and myalgias.   Skin: Negative for color change and rash.   Neurological: Negative for dizziness and headaches.   Psychiatric/Behavioral: Negative for agitation and behavioral problems.       Past medical, surgical, family and social history reviewed.  Objective:     Vitals:    09/16/20 1135   BP: 108/68   Pulse: 81   Resp: 18   Temp: 98.4 °F (36.9 °C)   TempSrc: Temporal   Weight: 66.8 kg (147 lb 4.3 oz)   Height: 5' 7" (1.702 m)   PainSc:   7   PainLoc: Back     Body mass index is 23.07 kg/m².     Physical Exam  Constitutional:       General: She is not in acute distress.     Appearance: She is well-developed. She is not diaphoretic.   HENT:      Head: " Normocephalic and atraumatic.      Right Ear: Hearing, tympanic membrane, ear canal and external ear normal.      Left Ear: Hearing, tympanic membrane, ear canal and external ear normal.      Nose: Nose normal.      Mouth/Throat:      Pharynx: Uvula midline.   Eyes:      General:         Right eye: No discharge.         Left eye: No discharge.      Conjunctiva/sclera: Conjunctivae normal.      Pupils: Pupils are equal, round, and reactive to light.   Neck:      Musculoskeletal: Normal range of motion and neck supple.      Thyroid: No thyromegaly.      Vascular: No carotid bruit or JVD.      Trachea: Trachea normal.   Cardiovascular:      Rate and Rhythm: Normal rate and regular rhythm.      Heart sounds: No murmur. No friction rub. No gallop.    Pulmonary:      Effort: Pulmonary effort is normal. No respiratory distress.      Breath sounds: Normal breath sounds. No wheezing or rales.   Chest:      Chest wall: No tenderness.   Abdominal:      General: Bowel sounds are normal. There is no distension.      Palpations: Abdomen is soft. There is no mass.      Tenderness: There is no abdominal tenderness. There is no guarding or rebound.   Genitourinary:     Comments: No cvt  Musculoskeletal: Normal range of motion.   Skin:     General: Skin is warm and dry.   Neurological:      Mental Status: She is alert and oriented to person, place, and time.      Coordination: Coordination normal.   Psychiatric:         Behavior: Behavior normal.         Thought Content: Thought content normal.         Judgment: Judgment normal.         Assessment:       1. Flank pain    2. RUQ pain    3. Microscopic hematuria        Plan:       Eve was seen today for abdominal pain.    Diagnoses and all orders for this visit:    Flank pain  -     POCT URINE DIPSTICK WITHOUT MICROSCOPE  -     ketorolac injection 60 mg  Results for orders placed or performed in visit on 09/16/20   POCT URINE DIPSTICK WITHOUT MICROSCOPE   Result Value Ref Range     Color, UA Yellow     Spec Grav UA 1.010     pH, UA 7     WBC, UA negative     Nitrite, UA negative     Protein trace     Glucose, UA negative     Ketones, UA negative     Urobilinogen, UA 0     Bilirubin negative     Blood, UA trace     Clarity, UA Clear          RUQ pain  -     Cancel: US Abdomen Limited; Future  -     US Abdomen Limited; Future  -     US Abdomen Limited    Microscopic hematuria  -     URINALYSIS; Future

## 2020-09-21 ENCOUNTER — OFFICE VISIT (OUTPATIENT)
Dept: GASTROENTEROLOGY | Facility: CLINIC | Age: 30
End: 2020-09-21
Payer: COMMERCIAL

## 2020-09-21 ENCOUNTER — PATIENT MESSAGE (OUTPATIENT)
Dept: GASTROENTEROLOGY | Facility: CLINIC | Age: 30
End: 2020-09-21

## 2020-09-21 ENCOUNTER — LAB VISIT (OUTPATIENT)
Dept: LAB | Facility: HOSPITAL | Age: 30
End: 2020-09-21
Attending: NURSE PRACTITIONER
Payer: COMMERCIAL

## 2020-09-21 VITALS — HEIGHT: 67 IN | BODY MASS INDEX: 23.04 KG/M2 | WEIGHT: 146.81 LBS

## 2020-09-21 DIAGNOSIS — Z01.818 PREOP TESTING: ICD-10-CM

## 2020-09-21 DIAGNOSIS — R10.11 RIGHT UPPER QUADRANT ABDOMINAL PAIN: ICD-10-CM

## 2020-09-21 DIAGNOSIS — R10.11 RIGHT UPPER QUADRANT ABDOMINAL PAIN: Primary | ICD-10-CM

## 2020-09-21 DIAGNOSIS — K59.00 CONSTIPATION, UNSPECIFIED CONSTIPATION TYPE: ICD-10-CM

## 2020-09-21 DIAGNOSIS — R19.7 INTERMITTENT DIARRHEA: ICD-10-CM

## 2020-09-21 LAB
ERYTHROCYTE [DISTWIDTH] IN BLOOD BY AUTOMATED COUNT: 14.3 % (ref 11.5–14.5)
HCT VFR BLD AUTO: 44.9 % (ref 37–48.5)
HGB BLD-MCNC: 13.8 G/DL (ref 12–16)
MCH RBC QN AUTO: 27.5 PG (ref 27–31)
MCHC RBC AUTO-ENTMCNC: 30.7 G/DL (ref 32–36)
MCV RBC AUTO: 89 FL (ref 82–98)
PLATELET # BLD AUTO: 363 K/UL (ref 150–350)
PMV BLD AUTO: 11.3 FL (ref 9.2–12.9)
RBC # BLD AUTO: 5.02 M/UL (ref 4–5.4)
WBC # BLD AUTO: 11.5 K/UL (ref 3.9–12.7)

## 2020-09-21 PROCEDURE — 36415 COLL VENOUS BLD VENIPUNCTURE: CPT | Mod: PO

## 2020-09-21 PROCEDURE — 99214 OFFICE O/P EST MOD 30 MIN: CPT | Mod: S$GLB,,, | Performed by: NURSE PRACTITIONER

## 2020-09-21 PROCEDURE — 99999 PR PBB SHADOW E&M-EST. PATIENT-LVL III: ICD-10-PCS | Mod: PBBFAC,,, | Performed by: NURSE PRACTITIONER

## 2020-09-21 PROCEDURE — 99214 PR OFFICE/OUTPT VISIT, EST, LEVL IV, 30-39 MIN: ICD-10-PCS | Mod: S$GLB,,, | Performed by: NURSE PRACTITIONER

## 2020-09-21 PROCEDURE — 86677 HELICOBACTER PYLORI ANTIBODY: CPT

## 2020-09-21 PROCEDURE — 99999 PR PBB SHADOW E&M-EST. PATIENT-LVL III: CPT | Mod: PBBFAC,,, | Performed by: NURSE PRACTITIONER

## 2020-09-21 PROCEDURE — 83690 ASSAY OF LIPASE: CPT

## 2020-09-21 PROCEDURE — 85027 COMPLETE CBC AUTOMATED: CPT

## 2020-09-21 RX ORDER — NORELGESTROMIN AND ETHINYL ESTRADIOL 35; 150 UG/MG; UG/MG
1 PATCH TRANSDERMAL WEEKLY
Status: ON HOLD | COMMUNITY
End: 2022-11-17 | Stop reason: HOSPADM

## 2020-09-21 NOTE — PATIENT INSTRUCTIONS
Eating a High-Fiber Diet  Fiber is what gives strength and structure to plants. Most grains, beans, vegetables, and fruits contain fiber. Foods rich in fiber are often low in calories and fat, and they fill you up more. They may also reduce your risks for certain health problems. To find out the amount of fiber in canned, packaged, or frozen foods, read the Nutrition Facts label. It tells you how much fiber is in a serving.    Types of fiber and their benefits  There are two types of fiber: insoluble and soluble. They both aid digestion and help you maintain a healthy weight.  · Insoluble fiber. This is found in whole grains, cereals, certain fruits and vegetables such as apple skin, corn, and carrots. Insoluble fiber may prevent constipation and reduce the risk for certain types of cancer.  · Soluble fiber. This type of fiber is in oats, beans, and certain fruits and vegetables such as strawberries and peas. Soluble fiber can reduce cholesterol, which may help lower the risk for heart disease. It also helps control blood sugar levels.  Look for high-fiber foods  Try these foods to add fiber to your diet:  · Whole-grain breads and cereals. Try to eat 6 to 8 ounces a day. Include wheat and oat bran cereals, whole-wheat muffins or toast, and corn tortillas in your meals.  · Fruits. Try to eat 2 cups a day. Apples, oranges, strawberries, pears, and bananas are good sources. (Note: Fruit juice is low in fiber.)  · Vegetables. Try to eat at least 2.5 cups a day. Add asparagus, carrots, broccoli, peas, and corn to your meals.  · Beans. One cup of cooked lentils gives you over 15 grams of fiber. Try navy beans, lentils, and chickpeas.  · Seeds. A small handful of seeds gives you about 3 grams of fiber. Try sunflower seeds.  Keep track of your fiber  Keep track of how much fiber you eat. Start by reading food labels. Then eat a variety of foods high in fiber. As you begin to eat more fiber, ask your healthcare provider  how much water you should be drinking to keep your digestive system working smoothly.  You should aim for a certain amount of fiber in your diet each day. If you are a woman, that amount is between 25 and 28 grams per day. Men should aim for 30 to 33 grams per day. After age 50, your daily fiber needs drop to 22 grams for women and 28 grams for men.  Before you reach for the fiber supplements, think about this. Fiber is found naturally in healthy whole foods. It gives you that feeling of fullness after you eat. Taking fiber supplements or eating fiber-enriched foods will not give you this full feeling.  Your fiber intake is a good measure for the quality of your overall diet. If you are missing out on your daily amount of fiber, you may be lacking other important nutrients as well.  Date Last Reviewed: 5/11/2015 © 2000-2017 [employer]. [address] [place] [address]. All rights reserved. This information is not intended as a substitute for professional medical care. Always follow your healthcare professional's instructions.            Abdominal Pain    Abdominal pain is pain in the stomach or belly area. Everyone has this pain from time to time. In many cases it goes away on its own. But abdominal pain can sometimes be due to a serious problem, such as appendicitis. So its important to know when to seek help.  Causes of abdominal pain  There are many possible causes of abdominal pain. Common causes in adults include:  · Constipation, diarrhea, or gas  · Stomach acid flowing back up into the esophagus (acid reflux or heartburn)  · Severe acid reflux, called GERD (gastroesophageal reflux disease)  · A sore in the lining of the stomach or small intestine (peptic ulcer)  · Inflammation of the gallbladder, liver, or pancreas  · Gallstones or kidney stones  · Appendicitis   · Intestinal blockage   · An internal organ pushing through a muscle or other tissue (hernia)  · Urinary tract  infections  · In women, menstrual cramps, fibroids, or endometriosis  · Inflammation or infection of the intestines  Diagnosing the cause of abdominal pain  Your healthcare provider will do a physical exam help find the cause of your pain. If needed, tests will be ordered. Belly pain has many possible causes. So it can be hard to find the reason for your pain. Giving details about your pain can help. Tell your provider where and when you feel the pain, and what makes it better or worse. Also let your provider know if you have other symptoms such as:  · Fever  · Tiredness  · Upset stomach (nausea)  · Vomiting  · Changes in bathroom habits  Treating abdominal pain  Some causes of pain need emergency medical treatment right away. These include appendicitis or a bowel blockage. Other problems can be treated with rest, fluids, or medicines. Your healthcare provider can give you specific instructions for treatment or self-care based on what is causing your pain.  If you have vomiting or diarrhea, sip water or other clear fluids. When you are ready to eat solid foods again, start with small amounts of easy-to-digest, low-fat foods. These include apple sauce, toast, or crackers.   When to seek medical care  Call 911 or go to the hospital right away if you:  · Cant pass stool and are vomiting  · Are vomiting blood or have bloody diarrhea or black, tarry diarrhea  · Have chest, neck, or shoulder pain  · Feel like you might pass out  · Have pain in your shoulder blades with nausea  · Have sudden, severe belly pain  · Have new, severe pain unlike any you have felt before  · Have a belly that is rigid, hard, and tender to touch  Call your healthcare provider if you have:  · Pain for more than 5 days  · Bloating for more than 2 days  · Diarrhea for more than 5 days  · A fever of 100.4°F (38.0°C) or higher, or as directed by your provider  · Pain that gets worse  · Weight loss for no reason  · Continued lack of appetite  · Blood  in your stool  How to prevent abdominal pain  Here are some tips to help prevent abdominal pain:  · Eat smaller amounts of food at one time.  · Avoid greasy, fried, or other high-fat foods.  · Avoid foods that give you gas.  · Exercise regularly.  · Drink plenty of fluids.  To help prevent GERD symptoms:  · Quit smoking.  · Reduce alcohol and certain foods that increase stomach acid.  · Avoid aspirin and over-the-counter pain and fever medicines (NSAIDS or nonsteroidal anti-inflammatory drugs), if possible  · Lose extra weight.  · Finish eating at least 2 hours before you go to bed or lie down.  · Raise the head of your bed.  Date Last Reviewed: 7/1/2016  © 5597-9310 [employer]. [address] [place] [address], [place] [address]. All rights reserved. This information is not intended as a substitute for professional medical care. Always follow your healthcare professional's instructions.

## 2020-09-21 NOTE — PROGRESS NOTES
Subjective:       Patient ID: Eve Blackwood is a 30 y.o. female Body mass index is 23 kg/m².    Chief Complaint: Rt upper quad abd pain    This patient is established.    Abdominal Pain  This is a recurrent problem. Episode onset: intermittent since 2011; recurred 9/16/2020. The problem occurs daily (typically 30 minutes after eating something). The problem has been unchanged. The pain is located in the RUQ. The pain is at a severity of 5/10 (currently). Quality: crushing sensation. The abdominal pain radiates to the LUQ, back and epigastric region. Associated symptoms include constipation (currently; intermittent, bowel movements are once every 2-3 days) and diarrhea (history of IBS; denies currently). Pertinent negatives include no anorexia, belching, dysuria, fever, flatus, hematochezia, melena, nausea, vomiting or weight loss. Associated symptoms comments: Gluten free/low gluten diet since 5/2020 (Dr. Masterson told she has non-celiac gluten sensivity). Exacerbated by: occurred ~45 minutes after she ate a chocolate waffle (without syrup), ice cream. Treatments tried: taking medrol dose pack as prescribed by PCP team for pain (for possible musculoskeletal etiologies); PAST: omeprazole- doesn't think it helped in the past. The treatment provided mild relief. Prior diagnostic workup includes ultrasound (patient emailed ultrasound report to us and PCP team had a copy of it as well). Her past medical history is significant for irritable bowel syndrome. There is no history of Crohn's disease, gallstones, GERD, pancreatitis or ulcerative colitis.     Review of Systems   Constitutional: Negative for appetite change, chills, fatigue, fever and weight loss.        Denies any recent falls or trauma; denies any heavy lifting   HENT: Negative for sore throat and trouble swallowing.    Respiratory: Negative for cough, choking and shortness of breath.    Cardiovascular: Negative for chest pain.   Gastrointestinal: Positive  for abdominal pain, constipation (currently; intermittent, bowel movements are once every 2-3 days) and diarrhea (history of IBS; denies currently). Negative for anal bleeding, anorexia, blood in stool, flatus, hematochezia, melena, nausea, rectal pain and vomiting.   Genitourinary: Negative for difficulty urinating, dysuria and flank pain.   Neurological: Negative for weakness.       Past Medical History:   Diagnosis Date    Abnormal Pap smear     Androgenic alopecia     Chronic rhinitis     Decreased fetal movement 4/10/2019    Hair loss 2017    Headache(784.0)     IBS (irritable bowel syndrome)     Irregular uterine contractions 2019    Preeclampsia 2019     Past Surgical History:   Procedure Laterality Date     SECTION       SECTION N/A 2019    Procedure:  SECTION;  Surgeon: Cee Joaquin MD;  Location: [facility];  Service: OB/GYN;  Laterality: N/A;    COLONOSCOPY      Dr. Tyson: normal findings per patient report    EXTERNAL EAR SURGERY      for cauliflower ear    Reconstructive ankly surgery      TONSILLECTOMY, ADENOIDECTOMY      UPPER GASTROINTESTINAL ENDOSCOPY      Dr. Tyson: normal findings per patient report     Family History   Problem Relation Age of Onset    Migraines Mother     Allergies Mother     Allergic rhinitis Mother     Heart disease Father     Hyperlipidemia Father     Migraines Father     Cancer Father     Allergies Father     Allergic rhinitis Father     Migraines Sister     Heart disease Maternal Grandmother     Allergies Maternal Grandmother     Allergic rhinitis Maternal Grandmother     Heart disease Maternal Grandfather     Allergies Maternal Grandfather     Allergic rhinitis Maternal Grandfather     Allergies Paternal Grandmother     Allergic rhinitis Paternal Grandmother     Allergies Paternal Grandfather     Allergic rhinitis Paternal Grandfather     Colon polyps Other     Angioedema Neg Hx      Asthma Neg Hx     Atopy Neg Hx     Eczema Neg Hx     Immunodeficiency Neg Hx     Rhinitis Neg Hx     Urticaria Neg Hx     Colon cancer Neg Hx     Crohn's disease Neg Hx     Stomach cancer Neg Hx     Ulcerative colitis Neg Hx     Esophageal cancer Neg Hx      Wt Readings from Last 10 Encounters:   09/21/20 66.6 kg (146 lb 13.2 oz)   09/16/20 66.8 kg (147 lb 4.3 oz)   02/24/20 68.6 kg (151 lb 3.8 oz)   01/15/20 67.1 kg (147 lb 14.9 oz)   12/09/19 65.7 kg (144 lb 13.5 oz)   09/20/19 63.5 kg (139 lb 15.9 oz)   09/03/19 63.5 kg (140 lb)   08/27/19 63.5 kg (140 lb)   08/23/19 72.7 kg (160 lb 4.4 oz)   06/13/19 72.7 kg (160 lb 6 oz)     Patient reports she had her last child on 6/13/2019    Lab Results   Component Value Date    WBC 7.63 12/09/2019    HGB 14.2 12/09/2019    HCT 42.7 12/09/2019    MCV 87 12/09/2019     12/09/2019     CMP  Sodium   Date Value Ref Range Status   12/09/2019 140 136 - 145 mmol/L Final     Potassium   Date Value Ref Range Status   12/09/2019 4.2 3.5 - 5.1 mmol/L Final     Chloride   Date Value Ref Range Status   12/09/2019 105 95 - 110 mmol/L Final     CO2   Date Value Ref Range Status   12/09/2019 25 23 - 29 mmol/L Final     Glucose   Date Value Ref Range Status   12/09/2019 81 70 - 110 mg/dL Final     BUN, Bld   Date Value Ref Range Status   12/09/2019 7 6 - 20 mg/dL Final     Creatinine   Date Value Ref Range Status   12/09/2019 0.8 0.5 - 1.4 mg/dL Final     Calcium   Date Value Ref Range Status   12/09/2019 9.7 8.7 - 10.5 mg/dL Final     Total Protein   Date Value Ref Range Status   12/09/2019 8.2 6.0 - 8.4 g/dL Final     Albumin   Date Value Ref Range Status   12/09/2019 4.3 3.5 - 5.2 g/dL Final     Total Bilirubin   Date Value Ref Range Status   12/09/2019 0.3 0.1 - 1.0 mg/dL Final     Comment:     For infants and newborns, interpretation of results should be based  on gestational age, weight and in agreement with clinical  observations.  Premature Infant recommended  "reference ranges:  Up to 24 hours.............<8.0 mg/dL  Up to 48 hours............<12.0 mg/dL  3-5 days..................<15.0 mg/dL  6-29 days.................<15.0 mg/dL       Alkaline Phosphatase   Date Value Ref Range Status   12/09/2019 65 55 - 135 U/L Final     AST   Date Value Ref Range Status   12/09/2019 17 10 - 40 U/L Final     ALT   Date Value Ref Range Status   12/09/2019 8 (L) 10 - 44 U/L Final     Anion Gap   Date Value Ref Range Status   12/09/2019 10 8 - 16 mmol/L Final     eGFR if    Date Value Ref Range Status   12/09/2019 >60 >60 mL/min/1.73 m^2 Final     eGFR if non    Date Value Ref Range Status   12/09/2019 >60 >60 mL/min/1.73 m^2 Final     Comment:     Calculation used to obtain the estimated glomerular filtration  rate (eGFR) is the CKD-EPI equation.        Lab Results   Component Value Date    TSH 1.120 02/24/2020 2/24/2020 HLA celiac CL2 (see results)    Reviewed prior medical records including radiology report of 8/14/2018 HIDA scan & 7/18/18 limited abdominal ultrasound.  9/16/2020 right upper abdominal ultrasound was reviewed and radiology report states:  Impression: " 1. No evidence of cholelithiasis, gallbladder wall thickening, or pericholecystic fluid is demonstrated. 2. Hepatomegaly is seen. The liver demonstrates a suggestion of mild increased echogenicity which may reflect fatty infiltration. 3. There appear to be 2 small cysts within the caudate lobe of the liver. 4. Additional findings and details as above."    Objective:      Physical Exam  Vitals signs and nursing note reviewed.   Constitutional:       General: She is not in acute distress.     Appearance: Normal appearance. She is well-developed. She is not diaphoretic.   HENT:      Mouth/Throat:      Comments: Patient is wearing a face mask, which covers patient's mouth and nose, due to COVID 19 concerns.  Eyes:      General: No scleral icterus.     Conjunctiva/sclera: Conjunctivae " normal.      Pupils: Pupils are equal, round, and reactive to light.   Pulmonary:      Effort: Pulmonary effort is normal. No respiratory distress.      Breath sounds: Normal breath sounds. No wheezing.   Abdominal:      General: Bowel sounds are normal. There is no distension or abdominal bruit.      Palpations: Abdomen is soft. Abdomen is not rigid. There is no mass.      Tenderness: There is abdominal tenderness (mild) in the right upper quadrant. There is no guarding or rebound.   Skin:     General: Skin is warm and dry.      Coloration: Skin is not pale.      Findings: No erythema or rash.      Comments: Non-jaundiced   Neurological:      Mental Status: She is alert and oriented to person, place, and time.   Psychiatric:         Behavior: Behavior normal.         Thought Content: Thought content normal.         Judgment: Judgment normal.         Assessment:       1. Right upper quadrant abdominal pain    2. Intermittent diarrhea    3. Constipation, unspecified constipation type        Plan:       Right upper quadrant abdominal pain  -     NM Hepatobiliary Imaging with GB (HIDA); Future; Expected date: 09/21/2020  -     Pregnancy, urine rapid; Future; Expected date: 09/21/2020  -     CBC Without Differential; Future; Expected date: 09/21/2020  -     Lipase; Future; Expected date: 09/21/2020  -     H. PYLORI ANTIBODY, IGG; Future; Expected date: 09/21/2020  - schedule EGD, discussed procedure with patient, including risks and benefits, patient verbalized understanding  - discussed about trial of an acid-reducing medication; patient verbalized understanding but patient declined acid-reducing medication at this time and she wants to proceed with testing at this time  - Possible CT scan pending results of testing and if symptoms persist    Intermittent diarrhea & Constipation, unspecified constipation type  - schedule EGD, discussed procedure with patient, including risks and benefits, patient verbalized  understanding  - recommended OTC probiotic, such as Align or Culturelle, as directed  - avoid lactose & caffeine  - avoid known triggers  Recommend high fiber diet (20-30 grams of fiber daily)/OTC fiber supplements daily as directed.  - Possible colonoscopy pending results of testing and if symptoms persist    Follow up in about 1 month (around 10/21/2020), or if symptoms worsen or fail to improve.      If no improvement in symptoms or symptoms worsen, call/follow-up at clinic or go to ER.

## 2020-09-22 ENCOUNTER — TELEPHONE (OUTPATIENT)
Dept: GASTROENTEROLOGY | Facility: CLINIC | Age: 30
End: 2020-09-22

## 2020-09-22 LAB — LIPASE SERPL-CCNC: 21 U/L (ref 4–60)

## 2020-09-22 NOTE — TELEPHONE ENCOUNTER
----- Message from MILTON Mosley sent at 9/22/2020 11:20 AM CDT -----  Please call to inform & review the results with the patient- Lab results showed normal pancreatic enzyme and blood counts showed no anemia.  Continue with previous recommendations.  Thanks,  Lizett ROSE-C

## 2020-09-24 ENCOUNTER — TELEPHONE (OUTPATIENT)
Dept: GASTROENTEROLOGY | Facility: CLINIC | Age: 30
End: 2020-09-24

## 2020-09-24 ENCOUNTER — PATIENT MESSAGE (OUTPATIENT)
Dept: GASTROENTEROLOGY | Facility: CLINIC | Age: 30
End: 2020-09-24

## 2020-09-24 DIAGNOSIS — R10.11 RIGHT UPPER QUADRANT ABDOMINAL PAIN: Primary | ICD-10-CM

## 2020-09-24 NOTE — TELEPHONE ENCOUNTER
"Please call to inform & review the results with the patient- radiology report of the HIDA scan done at [facility] showed "normal exam". Records given to staff to scan into chart. Ejection fraction was not documented on the report we received. I would recommend patient consult general surgery to see if they think it is gallbladder related and to discuss about possible cholecystectomy if it is. Typically normal gallbladder ejection fraction is 35% or greater so if the radiologist told you it was less than 35% then I think it would be a good idea to consult general surgery.    Continue with previous recommendations. If no improvement in symptoms or symptoms worsen, call/follow-up at clinic or go to ER.  Please release results to patient's mychart once you have discussed results and recommendations with patient.  Thanks,        "

## 2020-09-25 ENCOUNTER — LAB VISIT (OUTPATIENT)
Dept: FAMILY MEDICINE | Facility: CLINIC | Age: 30
End: 2020-09-25
Payer: COMMERCIAL

## 2020-09-25 DIAGNOSIS — Z01.818 PREOP TESTING: ICD-10-CM

## 2020-09-25 PROCEDURE — U0003 INFECTIOUS AGENT DETECTION BY NUCLEIC ACID (DNA OR RNA); SEVERE ACUTE RESPIRATORY SYNDROME CORONAVIRUS 2 (SARS-COV-2) (CORONAVIRUS DISEASE [COVID-19]), AMPLIFIED PROBE TECHNIQUE, MAKING USE OF HIGH THROUGHPUT TECHNOLOGIES AS DESCRIBED BY CMS-2020-01-R: HCPCS

## 2020-09-26 LAB — SARS-COV-2 RNA RESP QL NAA+PROBE: NOT DETECTED

## 2020-09-28 ENCOUNTER — TELEPHONE (OUTPATIENT)
Dept: GASTROENTEROLOGY | Facility: CLINIC | Age: 30
End: 2020-09-28

## 2020-09-28 ENCOUNTER — HOSPITAL ENCOUNTER (OUTPATIENT)
Facility: HOSPITAL | Age: 30
Discharge: HOME | End: 2020-09-28
Attending: INTERNAL MEDICINE | Admitting: INTERNAL MEDICINE
Payer: COMMERCIAL

## 2020-09-28 ENCOUNTER — ANESTHESIA (OUTPATIENT)
Dept: ENDOSCOPY | Facility: HOSPITAL | Age: 30
End: 2020-09-28
Payer: COMMERCIAL

## 2020-09-28 ENCOUNTER — ANESTHESIA EVENT (OUTPATIENT)
Dept: ENDOSCOPY | Facility: HOSPITAL | Age: 30
End: 2020-09-28
Payer: COMMERCIAL

## 2020-09-28 DIAGNOSIS — R10.9 ABDOMINAL PAIN: ICD-10-CM

## 2020-09-28 LAB
B-HCG UR QL: NEGATIVE
CTP QC/QA: YES
H PYLORI IGG SERPL QL IA: NEGATIVE

## 2020-09-28 PROCEDURE — 43239 PR EGD, FLEX, W/BIOPSY, SGL/MULTI: ICD-10-PCS | Mod: ,,, | Performed by: INTERNAL MEDICINE

## 2020-09-28 PROCEDURE — 37000008 HC ANESTHESIA 1ST 15 MINUTES: Mod: PO | Performed by: INTERNAL MEDICINE

## 2020-09-28 PROCEDURE — D9220A PRA ANESTHESIA: ICD-10-PCS | Mod: CRNA,,, | Performed by: NURSE ANESTHETIST, CERTIFIED REGISTERED

## 2020-09-28 PROCEDURE — 43239 EGD BIOPSY SINGLE/MULTIPLE: CPT | Mod: PO | Performed by: INTERNAL MEDICINE

## 2020-09-28 PROCEDURE — 37000009 HC ANESTHESIA EA ADD 15 MINS: Mod: PO | Performed by: INTERNAL MEDICINE

## 2020-09-28 PROCEDURE — 25000003 PHARM REV CODE 250: Mod: PO | Performed by: NURSE ANESTHETIST, CERTIFIED REGISTERED

## 2020-09-28 PROCEDURE — 88305 TISSUE EXAM BY PATHOLOGIST: CPT | Mod: 26,,, | Performed by: PATHOLOGY

## 2020-09-28 PROCEDURE — 27201012 HC FORCEPS, HOT/COLD, DISP: Mod: PO | Performed by: INTERNAL MEDICINE

## 2020-09-28 PROCEDURE — 63600175 PHARM REV CODE 636 W HCPCS: Mod: PO | Performed by: INTERNAL MEDICINE

## 2020-09-28 PROCEDURE — 43239 EGD BIOPSY SINGLE/MULTIPLE: CPT | Mod: ,,, | Performed by: INTERNAL MEDICINE

## 2020-09-28 PROCEDURE — D9220A PRA ANESTHESIA: Mod: ANES,,, | Performed by: ANESTHESIOLOGY

## 2020-09-28 PROCEDURE — 63600175 PHARM REV CODE 636 W HCPCS: Mod: PO | Performed by: NURSE ANESTHETIST, CERTIFIED REGISTERED

## 2020-09-28 PROCEDURE — 88305 TISSUE EXAM BY PATHOLOGIST: ICD-10-PCS | Mod: 26,,, | Performed by: PATHOLOGY

## 2020-09-28 PROCEDURE — D9220A PRA ANESTHESIA: Mod: CRNA,,, | Performed by: NURSE ANESTHETIST, CERTIFIED REGISTERED

## 2020-09-28 PROCEDURE — 81025 URINE PREGNANCY TEST: CPT | Mod: PO | Performed by: INTERNAL MEDICINE

## 2020-09-28 PROCEDURE — 88305 TISSUE EXAM BY PATHOLOGIST: CPT | Performed by: PATHOLOGY

## 2020-09-28 PROCEDURE — D9220A PRA ANESTHESIA: ICD-10-PCS | Mod: ANES,,, | Performed by: ANESTHESIOLOGY

## 2020-09-28 RX ORDER — PROPOFOL 10 MG/ML
VIAL (ML) INTRAVENOUS
Status: DISCONTINUED | OUTPATIENT
Start: 2020-09-28 | End: 2020-09-28

## 2020-09-28 RX ORDER — SODIUM CHLORIDE, SODIUM LACTATE, POTASSIUM CHLORIDE, CALCIUM CHLORIDE 600; 310; 30; 20 MG/100ML; MG/100ML; MG/100ML; MG/100ML
INJECTION, SOLUTION INTRAVENOUS CONTINUOUS
Status: DISCONTINUED | OUTPATIENT
Start: 2020-09-28 | End: 2020-09-28 | Stop reason: HOSPADM

## 2020-09-28 RX ORDER — LIDOCAINE HCL/PF 100 MG/5ML
SYRINGE (ML) INTRAVENOUS
Status: DISCONTINUED | OUTPATIENT
Start: 2020-09-28 | End: 2020-09-28

## 2020-09-28 RX ORDER — SODIUM CHLORIDE 0.9 % (FLUSH) 0.9 %
10 SYRINGE (ML) INJECTION
Status: DISCONTINUED | OUTPATIENT
Start: 2020-09-28 | End: 2020-09-28 | Stop reason: HOSPADM

## 2020-09-28 RX ORDER — FENTANYL CITRATE 50 UG/ML
INJECTION, SOLUTION INTRAMUSCULAR; INTRAVENOUS
Status: DISCONTINUED | OUTPATIENT
Start: 2020-09-28 | End: 2020-09-28

## 2020-09-28 RX ADMIN — PROPOFOL 100 MG: 10 INJECTION, EMULSION INTRAVENOUS at 08:09

## 2020-09-28 RX ADMIN — LIDOCAINE HYDROCHLORIDE 100 MG: 20 INJECTION, SOLUTION INTRAVENOUS at 08:09

## 2020-09-28 RX ADMIN — PROPOFOL 50 MG: 10 INJECTION, EMULSION INTRAVENOUS at 09:09

## 2020-09-28 RX ADMIN — FENTANYL CITRATE 50 MCG: 50 INJECTION, SOLUTION INTRAMUSCULAR; INTRAVENOUS at 08:09

## 2020-09-28 RX ADMIN — SODIUM CHLORIDE, SODIUM LACTATE, POTASSIUM CHLORIDE, AND CALCIUM CHLORIDE: .6; .31; .03; .02 INJECTION, SOLUTION INTRAVENOUS at 08:09

## 2020-09-28 RX ADMIN — PROPOFOL 50 MG: 10 INJECTION, EMULSION INTRAVENOUS at 08:09

## 2020-09-28 NOTE — DISCHARGE SUMMARY
Discharge Note  Short Stay      SUMMARY     Admit Date: 9/28/2020    Attending Physician: Jaya Villaseñor MD     Discharge Physician: Jaya iVllaseñor MD    Discharge Date: 9/28/2020 9:47 AM    Final Diagnosis: RUQ abdominal pain [R10.11]  Hx of diarrhea [Z87.898]    Disposition: HOME OR SELF CARE    Patient Instructions:   Current Discharge Medication List      CONTINUE these medications which have NOT CHANGED    Details   diphenhydrAMINE (BENADRYL) 25 mg capsule Take 25 mg by mouth nightly as needed.      galcanezumab-gnlm (EMGALITY PEN) 120 mg/mL PnIj Inject 1 pen (120 mg total) into the skin every 28 days.  Qty: 1 mL, Refills: 5      loratadine (CLARITIN) 10 mg tablet Take 10 mg by mouth once daily.      norelgestromin-ethinyl estradiol (ORTHO EVRA) 150-35 mcg/24 hr Place 1 patch onto the skin once a week.      rizatriptan (MAXALT) 10 MG tablet Take 1 tab at onset of migraine.  May repeat 1 tab in 2 hours if needed.  Limit to 3 tabs/week.  Qty: 9 tablet, Refills: 6             Discharge Procedure Orders (must include Diet, Follow-up, Activity)    Follow Up:  Follow up with PCP as previously scheduled  Resume routine diet.  Activity as tolerated.    No driving day of procedure.

## 2020-09-28 NOTE — H&P
History & Physical - Short Stay  Gastroenterology      SUBJECTIVE:     Procedure: EGD    Chief Complaint/Indication for Procedure: Abdominal Pain    PTA Medications   Medication Sig    diphenhydrAMINE (BENADRYL) 25 mg capsule Take 25 mg by mouth nightly as needed.    galcanezumab-gnlm (EMGALITY PEN) 120 mg/mL PnIj Inject 1 pen (120 mg total) into the skin every 28 days.    loratadine (CLARITIN) 10 mg tablet Take 10 mg by mouth once daily.    norelgestromin-ethinyl estradiol (ORTHO EVRA) 150-35 mcg/24 hr Place 1 patch onto the skin once a week.    rizatriptan (MAXALT) 10 MG tablet Take 1 tab at onset of migraine.  May repeat 1 tab in 2 hours if needed.  Limit to 3 tabs/week.       Review of patient's allergies indicates:   Allergen Reactions    Vancomycin analogues Swelling and Rash    Ciprofloxacin Swelling    Monistat 1 (tioconazole) [tioconazole] Hives        Past Medical History:   Diagnosis Date    Abnormal Pap smear     Androgenic alopecia     Arthritis     Celiac disease     Chronic rhinitis     Decreased fetal movement 4/10/2019    Hair loss 2017    Headache(784.0)     IBS (irritable bowel syndrome)     Irregular uterine contractions 2019    Preeclampsia 2019     Past Surgical History:   Procedure Laterality Date     SECTION       SECTION N/A 2019    Procedure:  SECTION;  Surgeon: Cee Joaquin MD;  Location: [facility];  Service: OB/GYN;  Laterality: N/A;    COLONOSCOPY      Dr. Tyson: normal findings per patient report    EXTERNAL EAR SURGERY      for cauliflower ear    Reconstructive ankly surgery      TONSILLECTOMY, ADENOIDECTOMY      UPPER GASTROINTESTINAL ENDOSCOPY      Dr. Tyson: normal findings per patient report     Family History   Problem Relation Age of Onset    Migraines Mother     Allergies Mother     Allergic rhinitis Mother     Heart disease Father     Hyperlipidemia Father     Migraines Father     Cancer  Father     Allergies Father     Allergic rhinitis Father     Migraines Sister     Heart disease Maternal Grandmother     Allergies Maternal Grandmother     Allergic rhinitis Maternal Grandmother     Heart disease Maternal Grandfather     Allergies Maternal Grandfather     Allergic rhinitis Maternal Grandfather     Allergies Paternal Grandmother     Allergic rhinitis Paternal Grandmother     Allergies Paternal Grandfather     Allergic rhinitis Paternal Grandfather     Colon polyps Other     Angioedema Neg Hx     Asthma Neg Hx     Atopy Neg Hx     Eczema Neg Hx     Immunodeficiency Neg Hx     Rhinitis Neg Hx     Urticaria Neg Hx     Colon cancer Neg Hx     Crohn's disease Neg Hx     Stomach cancer Neg Hx     Ulcerative colitis Neg Hx     Esophageal cancer Neg Hx      Social History     Tobacco Use    Smoking status: Never Smoker    Smokeless tobacco: Never Used   Substance Use Topics    Alcohol use: Yes     Comment: occasional- maybe 1-2 beers a month    Drug use: No         OBJECTIVE:     Vital Signs (Most Recent)  Temp: 98.1 °F (36.7 °C) (09/28/20 0836)  Pulse: 85 (09/28/20 0836)  Resp: 18 (09/28/20 0836)  BP: 103/67 (09/28/20 0836)  SpO2: 99 % (09/28/20 0836)    Physical Exam:                                                       GENERAL:  Comfortable, in no acute distress.                                 HEENT EXAM:  Nonicteric.  No adenopathy.  Oropharynx is clear.               NECK:  Supple.                                                               LUNGS:  Clear.                                                               CARDIAC:  Regular rate and rhythm.  S1, S2.  No murmur.                      ABDOMEN:  Soft, positive bowel sounds, nontender.  No hepatosplenomegaly or masses.  No rebound or guarding.                                             EXTREMITIES:  No edema.     MENTAL STATUS:  Normal, alert and oriented.      ASSESSMENT/PLAN:     Assessment: Abdominal  Pain    Plan: EGD    Anesthesia Plan: General    ASA Grade: ASA 2 - Patient with mild systemic disease with no functional limitations    MALLAMPATI SCORE:  I (soft palate, uvula, fauces, and tonsillar pillars visible)     In my medical opinion and judgment, this medical or surgical procedure was not able to be safely postponed in accordance with [place] Department [facility], Healthcare Facility Notice #[id number]-007.

## 2020-09-28 NOTE — ANESTHESIA POSTPROCEDURE EVALUATION
Anesthesia Post Evaluation    Patient: Eve Blackwood    Procedure(s) Performed: Procedure(s) (LRB):  EGD (ESOPHAGOGASTRODUODENOSCOPY) (N/A)    Final Anesthesia Type: general    Patient location during evaluation: PACU  Patient participation: Yes- Able to Participate  Level of consciousness: awake and alert  Post-procedure vital signs: reviewed and stable  Pain management: adequate  Airway patency: patent    PONV status at discharge: No PONV  Anesthetic complications: no      Cardiovascular status: hemodynamically stable  Respiratory status: unassisted and room air  Hydration status: euvolemic  Follow-up not needed.          Vitals Value Taken Time   /67 09/28/20 0920     09/28/20 1136   Pulse 83 09/28/20 0920   Resp 15 09/28/20 0920   SpO2 100 % 09/28/20 0920         Event Time   Out of Recovery 09:31:57         Pain/Yuni Score: Yuni Score: 10 (9/28/2020  9:20 AM)

## 2020-09-28 NOTE — ANESTHESIA PREPROCEDURE EVALUATION
09/28/2020  Eve Blackwood is a 30 y.o., female.    Anesthesia Evaluation    I have reviewed the Patient Summary Reports.    I have reviewed the Nursing Notes. I have reviewed the NPO Status.   I have reviewed the Medications.     Review of Systems  Anesthesia Hx:  No problems with previous Anesthesia    Social:  Non-Smoker    Hematology/Oncology:  Hematology Normal   Oncology Normal     EENT/Dental:   chronic allergic rhinitis   Cardiovascular:   Hypertension    Pulmonary:  Pulmonary Normal    Renal/:  Renal/ Normal     Hepatic/GI:   Abdominal pain    Musculoskeletal:  Musculoskeletal Normal    Neurological:   Headaches    Dermatological:  Skin Normal    Psych:  Psychiatric Normal           Physical Exam  General:  Well nourished    Airway/Jaw/Neck:  Airway Findings: Mouth Opening: Normal Tongue: Normal  General Airway Assessment: Adult  Mallampati: I  TM Distance: Normal, at least 6 cm        Eyes/Ears/Nose:  EYES/EARS/NOSE FINDINGS: Normal   Dental:  Dental Findings: In tact   Chest/Lungs:  Chest/Lungs Findings: Clear to auscultation, Normal Respiratory Rate     Heart/Vascular:  Heart Findings: Rate: Normal  Rhythm: Regular Rhythm        Mental Status:  Mental Status Findings:  Cooperative, Alert and Oriented         Anesthesia Plan  Type of Anesthesia, risks & benefits discussed:  Anesthesia Type:  general  Patient's Preference:   Intra-op Monitoring Plan: standard ASA monitors  Intra-op Monitoring Plan Comments:   Post Op Pain Control Plan:   Post Op Pain Control Plan Comments:   Induction:   IV  Beta Blocker:  Patient is not currently on a Beta-Blocker (No further documentation required).       Informed Consent: Patient understands risks and agrees with Anesthesia plan.  Questions answered. Anesthesia consent signed with patient.  ASA Score: 2     Day of Surgery Review of History & Physical:  I have interviewed and examined the patient. I have reviewed the patient's H&P dated:    H&P update referred to the provider.         Ready For Surgery From Anesthesia Perspective.        Cheek Interpolation Flap Text: A decision was made to reconstruct the defect utilizing an interpolation axial flap and a staged reconstruction.  A telfa template was made of the defect.  This telfa template was then used to outline the Cheek Interpolation flap.  The donor area for the pedicle flap was then injected with anesthesia.  The flap was excised through the skin and subcutaneous tissue down to the layer of the underlying musculature.  The interpolation flap was carefully excised within this deep plane to maintain its blood supply.  The edges of the donor site were undermined.   The donor site was closed in a primary fashion.  The pedicle was then rotated into position and sutured.  Once the tube was sutured into place, adequate blood supply was confirmed with blanching and refill.  The pedicle was then wrapped with xeroform gauze and dressed appropriately with a telfa and gauze bandage to ensure continued blood supply and protect the attached pedicle.

## 2020-09-28 NOTE — TRANSFER OF CARE
"Anesthesia Transfer of Care Note    Patient: Eve Blackwood    Procedure(s) Performed: Procedure(s) (LRB):  EGD (ESOPHAGOGASTRODUODENOSCOPY) (N/A)    Patient location: PACU    Anesthesia Type: general    Transport from OR: Transported from OR on room air with adequate spontaneous ventilation    Post pain: adequate analgesia    Post assessment: no apparent anesthetic complications and tolerated procedure well    Post vital signs: stable    Level of consciousness: awake    Nausea/Vomiting: no nausea/vomiting    Complications: none    Transfer of care protocol was followed      Last vitals:   Visit Vitals  /67 (BP Location: Right arm, Patient Position: Lying)   Pulse 85   Temp 36.7 °C (98.1 °F) (Skin)   Resp 18   Ht 5' 7" (1.702 m)   Wt 65.8 kg (145 lb)   LMP 09/28/2020 (Exact Date)   SpO2 99%   Breastfeeding No   BMI 22.71 kg/m²     "

## 2020-09-28 NOTE — TELEPHONE ENCOUNTER
----- Message from MILTON Mosley sent at 9/28/2020  2:36 PM CDT -----  Please call to inform & review the results with the patient- Remaining blood work showed negative for H. Pylori bacteria.  Continue with previous recommendations.  Thanks,  Lizett ROSE-C

## 2020-09-28 NOTE — [employer] PATIENT INSTRUCTIONS
Discharge Summary/Instructions after an Endoscopic Procedure  Patient Name: Eve Blackwood  Patient MRN: [id number]  Patient YOB: 1990  Monday, September 28, 2020  Jaya Villaseñor MD  RESTRICTIONS:  During your procedure today, you received medications for sedation.  These   medications may affect your judgment, balance and coordination.  Therefore,   for 24 hours, you have the following restrictions:   - DO NOT drive a car, operate machinery, make legal/financial decisions,   sign important papers or drink alcohol.    ACTIVITY:  Today: no heavy lifting, straining or running due to procedural   sedation/anesthesia.  The following day: return to full activity including work.  DIET:  Eat and drink normally unless instructed otherwise.     TREATMENT FOR COMMON SIDE EFFECTS:  - Mild abdominal pain, nausea, belching, bloating or excessive gas:  rest,   eat lightly and use a heating pad.  - Sore Throat: treat with throat lozenges and/or gargle with warm salt   water.  - Because air was used during the procedure, expelling large amounts of air   from your rectum or belching is normal.  - If a bowel prep was taken, you may not have a bowel movement for 1-3 days.    This is normal.  SYMPTOMS TO WATCH FOR AND REPORT TO YOUR PHYSICIAN:  1. Abdominal pain or bloating, other than gas cramps.  2. Chest pain.  3. Back pain.  4. Signs of infection such as: chills or fever occurring within 24 hours   after the procedure.  5. Rectal bleeding, which would show as bright red, maroon, or black stools.   (A tablespoon of blood from the rectum is not serious, especially if   hemorrhoids are present.)  6. Vomiting.  7. Weakness or dizziness.  GO DIRECTLY TO THE NEAREST EMERGENCY ROOM IF YOU HAVE ANY OF THE FOLLOWING:      Difficulty breathing              Chills and/or fever over 101 F   Persistent vomiting and/or vomiting blood   Severe abdominal pain   Severe chest pain   Black, tarry stools   Bleeding- more than one  tablespoon   Any other symptom or condition that you feel may need urgent attention  Your doctor recommends these additional instructions:  If any biopsies were taken, your doctors clinic will contact you in 1 to 2   weeks with any results.  We are waiting for your pathology results.   Continue your present medications.   You are being discharged to home.  For questions, problems or results please call your physician - Jaya Villaseñor MD at Work:  [phone].  EMERGENCY PHONE NUMBER: [phone], LAB RESULTS: [phone]  IF A COMPLICATION OR EMERGENCY SITUATION ARISES AND YOU ARE UNABLE TO REACH   YOUR PHYSICIAN - GO DIRECTLY TO THE EMERGENCY ROOM.  ___________________________________________  Nurse Signature  ___________________________________________  Patient/Designated Responsible Party Signature  Jaya Villaseñor MD  9/28/2020 9:07:58 AM  This report has been verified and signed electronically.  [employer]

## 2020-09-28 NOTE — DISCHARGE SUMMARY
Discharge Note  Short Stay      SUMMARY     Admit Date: 9/28/2020    Attending Physician: Jaya Villaseñor MD     Discharge Physician: Jaya Villaseñor MD    Discharge Date: 9/28/2020 9:09 AM    Final Diagnosis: RUQ abdominal pain [R10.11]  Hx of diarrhea [Z87.898]    Disposition: HOME OR SELF CARE    Patient Instructions:   Current Discharge Medication List      CONTINUE these medications which have NOT CHANGED    Details   diphenhydrAMINE (BENADRYL) 25 mg capsule Take 25 mg by mouth nightly as needed.      galcanezumab-gnlm (EMGALITY PEN) 120 mg/mL PnIj Inject 1 pen (120 mg total) into the skin every 28 days.  Qty: 1 mL, Refills: 5      loratadine (CLARITIN) 10 mg tablet Take 10 mg by mouth once daily.      norelgestromin-ethinyl estradiol (ORTHO EVRA) 150-35 mcg/24 hr Place 1 patch onto the skin once a week.      rizatriptan (MAXALT) 10 MG tablet Take 1 tab at onset of migraine.  May repeat 1 tab in 2 hours if needed.  Limit to 3 tabs/week.  Qty: 9 tablet, Refills: 6             Discharge Procedure Orders (must include Diet, Follow-up, Activity)    Follow Up:  Follow up with PCP as previously scheduled  Resume routine diet.  Activity as tolerated.    No driving day of procedure.

## 2020-09-29 VITALS
SYSTOLIC BLOOD PRESSURE: 108 MMHG | HEIGHT: 67 IN | HEART RATE: 83 BPM | WEIGHT: 145 LBS | RESPIRATION RATE: 15 BRPM | BODY MASS INDEX: 22.76 KG/M2 | DIASTOLIC BLOOD PRESSURE: 67 MMHG | OXYGEN SATURATION: 100 % | TEMPERATURE: 98 F

## 2020-10-01 ENCOUNTER — OFFICE VISIT (OUTPATIENT)
Dept: SURGERY | Facility: CLINIC | Age: 30
End: 2020-10-01
Payer: COMMERCIAL

## 2020-10-01 VITALS
SYSTOLIC BLOOD PRESSURE: 115 MMHG | HEIGHT: 67 IN | BODY MASS INDEX: 23.15 KG/M2 | HEART RATE: 95 BPM | WEIGHT: 147.5 LBS | DIASTOLIC BLOOD PRESSURE: 71 MMHG | TEMPERATURE: 97 F

## 2020-10-01 DIAGNOSIS — R10.11 RIGHT UPPER QUADRANT ABDOMINAL PAIN: Primary | ICD-10-CM

## 2020-10-01 DIAGNOSIS — Z01.818 PREOP EXAMINATION: ICD-10-CM

## 2020-10-01 PROCEDURE — 99244 PR OFFICE CONSULTATION,LEVEL IV: ICD-10-PCS | Mod: S$GLB,,, | Performed by: SURGERY

## 2020-10-01 PROCEDURE — 99244 OFF/OP CNSLTJ NEW/EST MOD 40: CPT | Mod: S$GLB,,, | Performed by: SURGERY

## 2020-10-01 PROCEDURE — 99999 PR PBB SHADOW E&M-EST. PATIENT-LVL IV: ICD-10-PCS | Mod: PBBFAC,,, | Performed by: SURGERY

## 2020-10-01 PROCEDURE — 99999 PR PBB SHADOW E&M-EST. PATIENT-LVL IV: CPT | Mod: PBBFAC,,, | Performed by: SURGERY

## 2020-10-01 NOTE — PATIENT INSTRUCTIONS
Surgery is scheduled for 10/14/20 arrival time will be given by the the preop nurse.  The preop nurse will call you from [phone]  Nothing to eat or drink after midnight.  Someone to drive you home.    THE PREOP NURSE WILL CALL, SOMETIMES AS LATE AS 4 or 5 PM IN THE AFTERNOON THE DAY BEFORE SURGERY.    Bathe the night before and the morning of your procedure with a Chlorhexidine wash such as Hibiclens, can be purchased at most Pharmacy's no prescription needed.    Special Instruction:  Your surgery is scheduled at the [facility] at [address] [facility] [address] in [place] on the first floor.     Contact Danny Murry LPN for any questions or concerns. [phone]

## 2020-10-01 NOTE — Clinical Note
I saw your patient, Eve Blackwood in the office.  Attached are my findings and plan.  Thank you for referring her to my office and if you have any questions please do not hesitate to call my cell [phone].    Nils Ramirez

## 2020-10-01 NOTE — LETTER
October 2, 2020      Lizett Ruano, [place]  [address] [facility] [address] [place] [address]           [facility]  [address] [place] [address]  Phone: [phone]          Patient: Eve Blackwood   MR Number: [id number]   YOB: 1990   Date of Visit: 10/1/2020       Dear Lizett Ruano:    Thank you for referring Eve Blackwood to me for evaluation. Attached you will find relevant portions of my assessment and plan of care.    If you have questions, please do not hesitate to call me. I look forward to following Eve Blackwood along with you.    Sincerely,    Tad Ramirez MD    Enclosure  CC:  No Recipients    If you would like to receive this communication electronically, please contact [email or web address] or [phone] to request more information on [employer] Link access.    For providers and/or their staff who would like to refer a patient to [facility], please contact us through our one-stop-shop provider referral line, Pawan Falcon, at [phone].    If you feel you have received this communication in error or would no longer like to receive these types of communications, please e-mail [email or web address]

## 2020-10-01 NOTE — H&P (VIEW-ONLY)
Subjective:       Patient ID: Eve Blackwood is a 30 y.o. female.    Chief Complaint: Consult (RUQ pain)    HPI  THis is a pleasant 31 yo F referred to me in consultation from Maricruz Ruano.  Pt notes that she has been having pain in the RUQ and epigastric region for several years.  Notes that the pain attacks have been occurring more frequently and that recent attacks have been more severe.  WHen present pain presents in RUQ and radiates to her flank and epigastrum.  Pt notes when present it will last approximately one hours.  Multiple workups with no definitive diagnosis.  Pt had EGD earlier this week with no significant findings.  Pt has had na US with no stones.  Recent HIDA at outside facility demonstrated an EF of 29%./   PT is otherwise healthy with no significant Medical or surgical history.   Review of Systems   Constitutional: Negative for activity change, appetite change, fever and unexpected weight change.   Respiratory: Negative for chest tightness, shortness of breath and wheezing.    Cardiovascular: Negative for chest pain.   Gastrointestinal: Positive for abdominal pain. Negative for abdominal distention, anal bleeding, blood in stool, constipation, diarrhea, nausea, rectal pain and vomiting.   Genitourinary: Negative for difficulty urinating, dysuria and frequency.   Integumentary:  Negative for wound.   Neurological: Negative for dizziness.   Hematological: Negative for adenopathy.   Psychiatric/Behavioral: Negative for agitation.         Objective:      Physical Exam  Vitals signs reviewed.   Constitutional:       Appearance: She is well-developed.   HENT:      Head: Normocephalic and atraumatic.   Eyes:      General: No scleral icterus.        Right eye: No discharge.         Left eye: No discharge.      Pupils: Pupils are equal, round, and reactive to light.   Neck:      Musculoskeletal: Normal range of motion and neck supple.      Thyroid: No thyromegaly.      Trachea: No tracheal  deviation.   Cardiovascular:      Rate and Rhythm: Normal rate and regular rhythm.      Heart sounds: Normal heart sounds. No murmur.   Pulmonary:      Effort: Pulmonary effort is normal.      Breath sounds: Normal breath sounds.   Chest:      Chest wall: No tenderness.   Abdominal:      General: Bowel sounds are normal. There is no distension or abdominal bruit.      Palpations: Abdomen is soft. Abdomen is not rigid. There is no mass or pulsatile mass.      Tenderness: There is no abdominal tenderness. There is no guarding or rebound. Negative signs include Andrea's sign and McBurney's sign.      Hernia: No hernia is present. There is no hernia in the ventral area.   Genitourinary:     Rectum: Normal.   Musculoskeletal: Normal range of motion.   Skin:     General: Skin is warm.      Findings: No erythema or rash.   Neurological:      Mental Status: She is alert and oriented to person, place, and time.       US: report from outside facility reviewed.  No stones.  No wall thickening    HIDA: GB filling noted.  EF 29%  Assessment:       1. Preop examination    2. Right upper quadrant abdominal pain        Plan:       Lengthy d/w pt.  D/w her that her EF on HIDA is just below lower levels of normal>  D/w her that it is possible that her GB is the source of her pain but that it is certainly not definitive. PT notes that she would like to have her GB removed.  Have had lengthy d/w pt regarding risks of surgery>  She desires to proceed with surgery.  Risks and benefits were d/w pt and informed consent obtained.  Surgery scheduled and informed consent obtaiend.

## 2020-10-02 RX ORDER — SODIUM CHLORIDE 9 MG/ML
INJECTION, SOLUTION INTRAVENOUS CONTINUOUS
Status: CANCELLED | OUTPATIENT
Start: 2020-10-02

## 2020-10-02 RX ORDER — METRONIDAZOLE 500 MG/100ML
500 INJECTION, SOLUTION INTRAVENOUS
Status: CANCELLED | OUTPATIENT
Start: 2020-10-02

## 2020-10-05 LAB
FINAL PATHOLOGIC DIAGNOSIS: NORMAL
GROSS: NORMAL
Lab: NORMAL

## 2020-10-11 ENCOUNTER — LAB VISIT (OUTPATIENT)
Dept: FAMILY MEDICINE | Facility: CLINIC | Age: 30
End: 2020-10-11
Payer: COMMERCIAL

## 2020-10-11 DIAGNOSIS — Z01.818 PREOP EXAMINATION: ICD-10-CM

## 2020-10-11 PROCEDURE — U0003 INFECTIOUS AGENT DETECTION BY NUCLEIC ACID (DNA OR RNA); SEVERE ACUTE RESPIRATORY SYNDROME CORONAVIRUS 2 (SARS-COV-2) (CORONAVIRUS DISEASE [COVID-19]), AMPLIFIED PROBE TECHNIQUE, MAKING USE OF HIGH THROUGHPUT TECHNOLOGIES AS DESCRIBED BY CMS-2020-01-R: HCPCS

## 2020-10-12 LAB — SARS-COV-2 RNA RESP QL NAA+PROBE: NOT DETECTED

## 2020-10-13 ENCOUNTER — ANESTHESIA EVENT (OUTPATIENT)
Dept: SURGERY | Facility: HOSPITAL | Age: 30
End: 2020-10-13
Payer: COMMERCIAL

## 2020-10-14 ENCOUNTER — HOSPITAL ENCOUNTER (OUTPATIENT)
Facility: HOSPITAL | Age: 30
Discharge: HOME | End: 2020-10-14
Attending: SURGERY | Admitting: SURGERY
Payer: COMMERCIAL

## 2020-10-14 ENCOUNTER — ANESTHESIA (OUTPATIENT)
Dept: SURGERY | Facility: HOSPITAL | Age: 30
End: 2020-10-14
Payer: COMMERCIAL

## 2020-10-14 VITALS
OXYGEN SATURATION: 100 % | DIASTOLIC BLOOD PRESSURE: 62 MMHG | BODY MASS INDEX: 23.07 KG/M2 | HEIGHT: 67 IN | SYSTOLIC BLOOD PRESSURE: 115 MMHG | TEMPERATURE: 97 F | HEART RATE: 81 BPM | RESPIRATION RATE: 18 BRPM | WEIGHT: 147 LBS

## 2020-10-14 DIAGNOSIS — R10.11 RIGHT UPPER QUADRANT ABDOMINAL PAIN: ICD-10-CM

## 2020-10-14 LAB
B-HCG UR QL: NEGATIVE
CTP QC/QA: YES

## 2020-10-14 PROCEDURE — 81025 URINE PREGNANCY TEST: CPT | Mod: PO | Performed by: SURGERY

## 2020-10-14 PROCEDURE — 47562 LAPAROSCOPIC CHOLECYSTECTOMY: CPT | Mod: ,,, | Performed by: SURGERY

## 2020-10-14 PROCEDURE — 25000003 PHARM REV CODE 250: Mod: PO | Performed by: SURGERY

## 2020-10-14 PROCEDURE — 63600175 PHARM REV CODE 636 W HCPCS: Mod: PO | Performed by: ANESTHESIOLOGY

## 2020-10-14 PROCEDURE — 37000008 HC ANESTHESIA 1ST 15 MINUTES: Mod: PO | Performed by: SURGERY

## 2020-10-14 PROCEDURE — 71000039 HC RECOVERY, EACH ADD'L HOUR: Mod: PO | Performed by: SURGERY

## 2020-10-14 PROCEDURE — 25000003 PHARM REV CODE 250: Mod: PO | Performed by: ANESTHESIOLOGY

## 2020-10-14 PROCEDURE — 47562 PR LAP,CHOLECYSTECTOMY: ICD-10-PCS | Mod: ,,, | Performed by: SURGERY

## 2020-10-14 PROCEDURE — 88304 TISSUE EXAM BY PATHOLOGIST: CPT | Mod: 26,,, | Performed by: PATHOLOGY

## 2020-10-14 PROCEDURE — 25000003 PHARM REV CODE 250: Mod: PO | Performed by: NURSE ANESTHETIST, CERTIFIED REGISTERED

## 2020-10-14 PROCEDURE — D9220A PRA ANESTHESIA: Mod: ANES,,, | Performed by: ANESTHESIOLOGY

## 2020-10-14 PROCEDURE — 36000708 HC OR TIME LEV III 1ST 15 MIN: Mod: PO | Performed by: SURGERY

## 2020-10-14 PROCEDURE — 36000709 HC OR TIME LEV III EA ADD 15 MIN: Mod: PO | Performed by: SURGERY

## 2020-10-14 PROCEDURE — 63600175 PHARM REV CODE 636 W HCPCS: Mod: PO | Performed by: SURGERY

## 2020-10-14 PROCEDURE — 63600175 PHARM REV CODE 636 W HCPCS: Mod: PO | Performed by: NURSE ANESTHETIST, CERTIFIED REGISTERED

## 2020-10-14 PROCEDURE — D9220A PRA ANESTHESIA: ICD-10-PCS | Mod: CRNA,,, | Performed by: NURSE ANESTHETIST, CERTIFIED REGISTERED

## 2020-10-14 PROCEDURE — 88304 TISSUE EXAM BY PATHOLOGIST: CPT | Performed by: PATHOLOGY

## 2020-10-14 PROCEDURE — 27201423 OPTIME MED/SURG SUP & DEVICES STERILE SUPPLY: Mod: PO | Performed by: SURGERY

## 2020-10-14 PROCEDURE — 71000033 HC RECOVERY, INTIAL HOUR: Mod: PO | Performed by: SURGERY

## 2020-10-14 PROCEDURE — S0030 INJECTION, METRONIDAZOLE: HCPCS | Mod: PO | Performed by: SURGERY

## 2020-10-14 PROCEDURE — 88304 PR  SURG PATH,LEVEL III: ICD-10-PCS | Mod: 26,,, | Performed by: PATHOLOGY

## 2020-10-14 PROCEDURE — 37000009 HC ANESTHESIA EA ADD 15 MINS: Mod: PO | Performed by: SURGERY

## 2020-10-14 PROCEDURE — D9220A PRA ANESTHESIA: ICD-10-PCS | Mod: ANES,,, | Performed by: ANESTHESIOLOGY

## 2020-10-14 PROCEDURE — D9220A PRA ANESTHESIA: Mod: CRNA,,, | Performed by: NURSE ANESTHETIST, CERTIFIED REGISTERED

## 2020-10-14 RX ORDER — ONDANSETRON 2 MG/ML
INJECTION INTRAMUSCULAR; INTRAVENOUS
Status: DISCONTINUED | OUTPATIENT
Start: 2020-10-14 | End: 2020-10-14

## 2020-10-14 RX ORDER — OXYCODONE HYDROCHLORIDE 5 MG/1
5 TABLET ORAL
Status: DISCONTINUED | OUTPATIENT
Start: 2020-10-14 | End: 2020-10-14 | Stop reason: HOSPADM

## 2020-10-14 RX ORDER — SCOLOPAMINE TRANSDERMAL SYSTEM 1 MG/1
1 PATCH, EXTENDED RELEASE TRANSDERMAL
Status: DISCONTINUED | OUTPATIENT
Start: 2020-10-14 | End: 2020-10-14 | Stop reason: HOSPADM

## 2020-10-14 RX ORDER — LIDOCAINE HCL/PF 100 MG/5ML
SYRINGE (ML) INTRAVENOUS
Status: DISCONTINUED | OUTPATIENT
Start: 2020-10-14 | End: 2020-10-14

## 2020-10-14 RX ORDER — BUPIVACAINE HYDROCHLORIDE AND EPINEPHRINE 2.5; 5 MG/ML; UG/ML
INJECTION, SOLUTION EPIDURAL; INFILTRATION; INTRACAUDAL; PERINEURAL
Status: DISCONTINUED | OUTPATIENT
Start: 2020-10-14 | End: 2020-10-14 | Stop reason: HOSPADM

## 2020-10-14 RX ORDER — PROPOFOL 10 MG/ML
VIAL (ML) INTRAVENOUS
Status: DISCONTINUED | OUTPATIENT
Start: 2020-10-14 | End: 2020-10-14

## 2020-10-14 RX ORDER — FENTANYL CITRATE 50 UG/ML
INJECTION, SOLUTION INTRAMUSCULAR; INTRAVENOUS
Status: DISCONTINUED | OUTPATIENT
Start: 2020-10-14 | End: 2020-10-14

## 2020-10-14 RX ORDER — SODIUM CHLORIDE, SODIUM LACTATE, POTASSIUM CHLORIDE, CALCIUM CHLORIDE 600; 310; 30; 20 MG/100ML; MG/100ML; MG/100ML; MG/100ML
INJECTION, SOLUTION INTRAVENOUS CONTINUOUS
Status: DISCONTINUED | OUTPATIENT
Start: 2020-10-14 | End: 2020-10-14 | Stop reason: HOSPADM

## 2020-10-14 RX ORDER — ROCURONIUM BROMIDE 10 MG/ML
INJECTION, SOLUTION INTRAVENOUS
Status: DISCONTINUED | OUTPATIENT
Start: 2020-10-14 | End: 2020-10-14

## 2020-10-14 RX ORDER — HYDROCODONE BITARTRATE AND ACETAMINOPHEN 5; 325 MG/1; MG/1
1 TABLET ORAL EVERY 6 HOURS PRN
Qty: 20 TABLET | Refills: 0 | Status: SHIPPED | OUTPATIENT
Start: 2020-10-14 | End: 2021-04-09 | Stop reason: ALTCHOICE

## 2020-10-14 RX ORDER — SODIUM CHLORIDE 9 MG/ML
INJECTION, SOLUTION INTRAVENOUS CONTINUOUS
Status: DISCONTINUED | OUTPATIENT
Start: 2020-10-14 | End: 2020-10-14 | Stop reason: HOSPADM

## 2020-10-14 RX ORDER — METRONIDAZOLE 500 MG/100ML
500 INJECTION, SOLUTION INTRAVENOUS
Status: COMPLETED | OUTPATIENT
Start: 2020-10-14 | End: 2020-10-14

## 2020-10-14 RX ORDER — HYDROCODONE BITARTRATE AND ACETAMINOPHEN 5; 325 MG/1; MG/1
1 TABLET ORAL EVERY 4 HOURS PRN
Status: DISCONTINUED | OUTPATIENT
Start: 2020-10-14 | End: 2020-10-14 | Stop reason: HOSPADM

## 2020-10-14 RX ORDER — LIDOCAINE HYDROCHLORIDE 10 MG/ML
1 INJECTION, SOLUTION EPIDURAL; INFILTRATION; INTRACAUDAL; PERINEURAL ONCE
Status: DISCONTINUED | OUTPATIENT
Start: 2020-10-14 | End: 2020-10-14 | Stop reason: HOSPADM

## 2020-10-14 RX ORDER — FAMOTIDINE 10 MG/ML
20 INJECTION INTRAVENOUS ONCE
Status: COMPLETED | OUTPATIENT
Start: 2020-10-14 | End: 2020-10-14

## 2020-10-14 RX ORDER — KETAMINE HCL IN 0.9 % NACL 50 MG/5 ML
SYRINGE (ML) INTRAVENOUS
Status: DISCONTINUED | OUTPATIENT
Start: 2020-10-14 | End: 2020-10-14

## 2020-10-14 RX ORDER — MIDAZOLAM HYDROCHLORIDE 1 MG/ML
INJECTION INTRAMUSCULAR; INTRAVENOUS
Status: DISCONTINUED | OUTPATIENT
Start: 2020-10-14 | End: 2020-10-14

## 2020-10-14 RX ORDER — ACETAMINOPHEN 10 MG/ML
INJECTION, SOLUTION INTRAVENOUS
Status: DISCONTINUED | OUTPATIENT
Start: 2020-10-14 | End: 2020-10-14

## 2020-10-14 RX ORDER — ONDANSETRON 2 MG/ML
4 INJECTION INTRAMUSCULAR; INTRAVENOUS EVERY 12 HOURS PRN
Status: DISCONTINUED | OUTPATIENT
Start: 2020-10-14 | End: 2020-10-14 | Stop reason: HOSPADM

## 2020-10-14 RX ORDER — FENTANYL CITRATE 50 UG/ML
25 INJECTION, SOLUTION INTRAMUSCULAR; INTRAVENOUS EVERY 5 MIN PRN
Status: DISCONTINUED | OUTPATIENT
Start: 2020-10-14 | End: 2020-10-14 | Stop reason: HOSPADM

## 2020-10-14 RX ORDER — HYDROMORPHONE HYDROCHLORIDE 2 MG/ML
0.2 INJECTION, SOLUTION INTRAMUSCULAR; INTRAVENOUS; SUBCUTANEOUS EVERY 5 MIN PRN
Status: DISCONTINUED | OUTPATIENT
Start: 2020-10-14 | End: 2020-10-14 | Stop reason: HOSPADM

## 2020-10-14 RX ADMIN — PROMETHAZINE HYDROCHLORIDE 6.25 MG: 25 INJECTION INTRAMUSCULAR; INTRAVENOUS at 11:10

## 2020-10-14 RX ADMIN — SODIUM CHLORIDE, SODIUM LACTATE, POTASSIUM CHLORIDE, AND CALCIUM CHLORIDE: .6; .31; .03; .02 INJECTION, SOLUTION INTRAVENOUS at 08:10

## 2020-10-14 RX ADMIN — ONDANSETRON 4 MG: 2 INJECTION, SOLUTION INTRAMUSCULAR; INTRAVENOUS at 08:10

## 2020-10-14 RX ADMIN — FENTANYL CITRATE 25 MCG: 50 INJECTION, SOLUTION INTRAMUSCULAR; INTRAVENOUS at 10:10

## 2020-10-14 RX ADMIN — FENTANYL CITRATE 50 MCG: 50 INJECTION, SOLUTION INTRAMUSCULAR; INTRAVENOUS at 08:10

## 2020-10-14 RX ADMIN — CEFTRIAXONE 2 G: 2 INJECTION, SOLUTION INTRAVENOUS at 08:10

## 2020-10-14 RX ADMIN — OXYCODONE 5 MG: 5 TABLET ORAL at 10:10

## 2020-10-14 RX ADMIN — SCOPALAMINE 1 PATCH: 1 PATCH, EXTENDED RELEASE TRANSDERMAL at 08:10

## 2020-10-14 RX ADMIN — FAMOTIDINE 20 MG: 10 INJECTION INTRAVENOUS at 08:10

## 2020-10-14 RX ADMIN — ACETAMINOPHEN 1000 MG: 10 INJECTION, SOLUTION INTRAVENOUS at 08:10

## 2020-10-14 RX ADMIN — ONDANSETRON HYDROCHLORIDE 4 MG: 2 SOLUTION INTRAMUSCULAR; INTRAVENOUS at 10:10

## 2020-10-14 RX ADMIN — SODIUM CHLORIDE, SODIUM LACTATE, POTASSIUM CHLORIDE, AND CALCIUM CHLORIDE 500 ML: .6; .31; .03; .02 INJECTION, SOLUTION INTRAVENOUS at 11:10

## 2020-10-14 RX ADMIN — ROCURONIUM BROMIDE 25 MG: 10 INJECTION, SOLUTION INTRAVENOUS at 08:10

## 2020-10-14 RX ADMIN — LIDOCAINE HYDROCHLORIDE 100 MG: 20 INJECTION PARENTERAL at 08:10

## 2020-10-14 RX ADMIN — MIDAZOLAM HYDROCHLORIDE 2 MG: 1 INJECTION, SOLUTION INTRAMUSCULAR; INTRAVENOUS at 08:10

## 2020-10-14 RX ADMIN — Medication 20 MG: at 08:10

## 2020-10-14 RX ADMIN — METRONIDAZOLE 500 MG: 500 INJECTION, SOLUTION INTRAVENOUS at 08:10

## 2020-10-14 RX ADMIN — SUGAMMADEX 200 MG: 100 INJECTION, SOLUTION INTRAVENOUS at 09:10

## 2020-10-14 RX ADMIN — FENTANYL CITRATE 25 MCG: 50 INJECTION, SOLUTION INTRAMUSCULAR; INTRAVENOUS at 09:10

## 2020-10-14 RX ADMIN — PROPOFOL 150 MG: 10 INJECTION, EMULSION INTRAVENOUS at 08:10

## 2020-10-14 NOTE — OP NOTE
Date of surgery:  October 14, 2020    Staff surgeon:  Dr. Tad Ramirez    Preoperative diagnosis:  Biliary colic    Postoperative diagnosis:  The same    Procedure:  Laparoscopic cholecystectomy    Anesthesia:  General endotracheal anesthesia    Indication for procedure:  Pleasant 30-year-old female presented to the office with signs symptoms suggestive of biliary colic.  She is scheduled for laparoscopic cholecystectomy the above-mentioned date.    Description of procedure:  Following signing informed consent patient in the operating room placed supine position.  General endotracheal anesthesia was administered without event.  The abdomen is prepped and draped in standard fashion and appropriate time-out procedure was then performed.  Next a  small transverse incision above the umbilicus is made and carried down to  the deep dermal tissue.  The abdominal cavity is entered with a  Veress needle and pneumoperitoneum to 15 mmHg is established.  Next the abdomen is entered with an Optiview trocar under direct visualization.  Patient placed in reverse Trendelenburg and tilted towards the left side.  A 5 mm is placed in the epigastric trocar and 2 in the right subcostal margin.  All trocars were placed under direct visualization and after injection of quarter percent Marcaine with epi.  The fundus was grasped and held over the patient's liver.  This exposed the infundibulum which was grasped and held towards the patient's feet.  Next the triangle of Calot is dissected identifying the cystic duct and cystic artery in their usual anatomic locations. Two clips are placed distally on the cystic duct 1 clip proximally.  Two clips are placed on the cystic artery. The duct and artery are cut between clips.  Next cautery is used to remove the gallbladder from the hepatic fossa.  The gallbladder is then removed from the abdominal cavity with assistance of an Endo-Catch bag.  Having removed the gallbladder the hepatic fossa is  evaluated  once again to ensure adequate hemostasis.  Next all trocars are removed under direct visualization.  I closed the umbilical fascia with 0 Vicryl suture.  Skin incision was closed with 4-0 Monocryl.  Patient is extubated taken recovery room stable condition.  Blood loss was 10 cc's

## 2020-10-14 NOTE — TRANSFER OF CARE
"Anesthesia Transfer of Care Note    Patient: Eve Blackwood    Procedure(s) Performed: Procedure(s) (LRB):  CHOLECYSTECTOMY, LAPAROSCOPIC (N/A)    Patient location: PACU    Anesthesia Type: general    Transport from OR: Transported from OR on room air with adequate spontaneous ventilation    Post pain: adequate analgesia    Post assessment: no apparent anesthetic complications and tolerated procedure well    Post vital signs: stable    Level of consciousness: awake and sedated    Nausea/Vomiting: no nausea/vomiting    Complications: none    Transfer of care protocol was followed      Last vitals:   Visit Vitals  /71 (BP Location: Right arm, Patient Position: Lying)   Pulse 82   Temp 36.8 °C (98.2 °F) (Skin)   Resp 18   Ht 5' 7" (1.702 m)   Wt 66.7 kg (147 lb)   LMP 09/28/2020 (Exact Date)   SpO2 100%   Breastfeeding No   BMI 23.02 kg/m²     "

## 2020-10-14 NOTE — INTERVAL H&P NOTE
The patient has been examined and the H&P has been reviewed:    I concur with the findings and no changes have occurred since H&P was written.    Surgery risks, benefits and alternative options discussed and understood by patient/family.          Active Hospital Problems    Diagnosis  POA    Right upper quadrant abdominal pain [R10.11]  Yes      Resolved Hospital Problems   No resolved problems to display.

## 2020-10-14 NOTE — ANESTHESIA POSTPROCEDURE EVALUATION
Anesthesia Post Evaluation    Patient: Eve Blackwood    Procedure(s) Performed: Procedure(s) (LRB):  CHOLECYSTECTOMY, LAPAROSCOPIC (N/A)    Final Anesthesia Type: general    Patient location during evaluation: PACU  Patient participation: Yes- Able to Participate  Level of consciousness: sedated and awake  Post-procedure vital signs: reviewed and stable  Pain management: adequate  Airway patency: patent    PONV status at discharge: No PONV  Anesthetic complications: no      Cardiovascular status: blood pressure returned to baseline  Respiratory status: spontaneous ventilation  Hydration status: euvolemic  Follow-up not needed.          Vitals Value Taken Time   /81 10/14/20 1040   Temp 36.4 °C (97.5 °F) 10/14/20 1015   Pulse 72 10/14/20 1040   Resp 12 10/14/20 1040   SpO2 100 % 10/14/20 1040         No case tracking events are documented in the log.      Pain/Yuni Score: Pain Rating Prior to Med Admin: 9 (10/14/2020 10:07 AM)  Yuni Score: 7 (10/14/2020 10:15 AM)

## 2020-10-14 NOTE — PLAN OF CARE
Patient awake, alert and oriented. Tolerating PO. Denies complaints of pain. Incisions   x4 covered with bandaides without redness swelling or drainage. Discharge instructions reviewed with patient. Patient verbalized understanding. Patient discharged home with family.

## 2020-10-14 NOTE — DISCHARGE INSTRUCTIONS
Department of General Surgery  [facility]  LAPAROSCOPIC CHOLECYSTECTOMY    DOS:   Minimal activity for 48 hours   Regular diet as tolerated   May shower in 48 hours   May remove outer dressing in 48 hours. Leave ster-strips in place. If steri-strips get wet, pat dry. If they fall off, do not worry.   Resume home medications as prescribed.    DONT:   Do not lift anything over 15 pounds until you have been released by your physician.   Do not soak in tub   No driving for 24 hours or while taking narcotic pain medication.   DO NOT TAKE ADDITIONAL TYLENOL/ACETAMINOPHEN WHILE TAKING NARCOTIC PAIN MEDICINE THAT CONTAINS TYLENOL/ACETAMINOPHEN.    CALL PHYSICIAN FOR:   Redness, swelling, or bleeding from surgical site   Fever greater then 101.   Drainage from the incision site that appears infected.   Persistent pain not relieved by pain medication.    RETURN APPOINTMENT: Call to schedule appointment in 10-14 days    FOR EMERGENCIES: Contact  Your doctor at [phone]

## 2020-10-14 NOTE — BRIEF OP NOTE
[facility]  Brief Operative Note    Surgery Date: 10/14/2020     Surgeon(s) and Role:     * Tad Ramirez MD - Primary    Assisting Surgeon: None    Pre-op Diagnosis:  Right upper quadrant abdominal pain [R10.11]    Post-op Diagnosis:  Post-Op Diagnosis Codes:     * Right upper quadrant abdominal pain [R10.11]    Procedure(s) (LRB):  CHOLECYSTECTOMY, LAPAROSCOPIC (N/A)    Anesthesia: General    Description of the findings of the procedure(s): Normal biliary anatomy    Estimated Blood Loss:10 cc's       Specimens:   Specimen (12h ago, onward)    None            Discharge Note    OUTCOME: Patient tolerated treatment/procedure well without complication and is now ready for discharge.    DISPOSITION: Home or Self Care    FINAL DIAGNOSIS:  Right upper quadrant abdominal pain    FOLLOWUP: In clinic    DISCHARGE INSTRUCTIONS:    Discharge Procedure Orders   Diet general     Call MD for:  extreme fatigue     Call MD for:  persistent dizziness or light-headedness     Call MD for:  hives     Call MD for:  redness, tenderness, or signs of infection (pain, swelling, redness, odor or green/yellow discharge around incision site)     Call MD for:  difficulty breathing, headache or visual disturbances     Call MD for:  severe uncontrolled pain     Call MD for:  persistent nausea and vomiting     Call MD for:  temperature >100.4     Remove dressing in 48 hours     Activity as tolerated

## 2020-10-14 NOTE — ANESTHESIA PREPROCEDURE EVALUATION
10/14/2020  Eve Blackwood is a 30 y.o., female.    Anesthesia Evaluation    I have reviewed the Patient Summary Reports.    I have reviewed the Nursing Notes. I have reviewed the NPO Status.   I have reviewed the Medications.     Review of Systems  Cardiovascular:   Hypertension    Pulmonary:  Pulmonary Normal    Renal/:  Renal/ Normal     Hepatic/GI:   IBS, Celiac   Neurological:   Headaches    Endocrine:  Endocrine Normal    Psych:  Psychiatric Normal           Physical Exam  General:  Well nourished    Airway/Jaw/Neck:  Airway Findings: Mouth Opening: Normal Tongue: Normal  General Airway Assessment: Adult  Mallampati: II  Improves to II with phonation.      Dental:  Dental Findings: In tact   Chest/Lungs:  Chest/Lungs Findings: Clear to auscultation, Normal Respiratory Rate     Heart/Vascular:  Heart Findings: Rate: Normal        Mental Status:  Mental Status Findings:  Cooperative, Alert and Oriented         Anesthesia Plan  Type of Anesthesia, risks & benefits discussed:  Anesthesia Type:  general  Patient's Preference:   Intra-op Monitoring Plan: standard ASA monitors  Intra-op Monitoring Plan Comments:   Post Op Pain Control Plan: multimodal analgesia and IV/PO Opioids PRN  Post Op Pain Control Plan Comments:   Induction:   IV and Inhalation  Beta Blocker:  Patient is not currently on a Beta-Blocker (No further documentation required).       Informed Consent: Patient understands risks and agrees with Anesthesia plan.  Questions answered. Anesthesia consent signed with patient.  ASA Score: 2     Day of Surgery Review of History & Physical:            Ready For Surgery From Anesthesia Perspective.

## 2020-10-15 ENCOUNTER — TELEPHONE (OUTPATIENT)
Dept: PHARMACY | Facility: CLINIC | Age: 30
End: 2020-10-15

## 2020-10-22 ENCOUNTER — PATIENT MESSAGE (OUTPATIENT)
Dept: RHEUMATOLOGY | Facility: CLINIC | Age: 30
End: 2020-10-22

## 2020-10-22 LAB
FINAL PATHOLOGIC DIAGNOSIS: NORMAL
Lab: NORMAL

## 2020-10-23 ENCOUNTER — OFFICE VISIT (OUTPATIENT)
Dept: RHEUMATOLOGY | Facility: CLINIC | Age: 30
End: 2020-10-23
Payer: COMMERCIAL

## 2020-10-23 DIAGNOSIS — R76.8 EPSTEIN-BARR VIRUS SEROPOSITIVITY: ICD-10-CM

## 2020-10-23 DIAGNOSIS — R79.82 CRP ELEVATED: ICD-10-CM

## 2020-10-23 DIAGNOSIS — K90.41 NCGS (NON-CELIAC GLUTEN SENSITIVITY): ICD-10-CM

## 2020-10-23 DIAGNOSIS — M02.30 REACTIVE ARTHRITIS: Primary | ICD-10-CM

## 2020-10-23 DIAGNOSIS — L65.9 HAIR LOSS: ICD-10-CM

## 2020-10-23 DIAGNOSIS — K58.9 IRRITABLE BOWEL SYNDROME, UNSPECIFIED TYPE: ICD-10-CM

## 2020-10-23 PROCEDURE — 99213 OFFICE O/P EST LOW 20 MIN: CPT | Mod: 95,,, | Performed by: INTERNAL MEDICINE

## 2020-10-23 PROCEDURE — 99213 PR OFFICE/OUTPT VISIT, EST, LEVL III, 20-29 MIN: ICD-10-PCS | Mod: 95,,, | Performed by: INTERNAL MEDICINE

## 2020-10-23 NOTE — PROGRESS NOTES
Subjective:       Patient ID: Eve Blackwood is a 30 y.o. female.    Chief Complaint: No chief complaint on file.     30 year old female who presents to telemedicine virtual visit for follow up on reactive arthritis.  She has been doing quite well since her last visit. She has noticed a significant improvement in her joint pain with changing to gluten free diet.  She previously was suffering with pain in her hands, hips, knees, and feet. She denies arthritis flare since May. No recurrence of rash. She does have intermittent paresthesia in the hands and suffers from occipital neuralgia and chronic migraine followed by Neurology. We reviewed recent labs. Plaquenil was discussed at last visit, but not started yet.       Review of Systems   Constitutional: Negative for activity change, appetite change, chills, fatigue and fever.   Eyes: Negative for visual disturbance.   Respiratory: Negative for cough and shortness of breath.    Cardiovascular: Negative for chest pain, palpitations and leg swelling.   Gastrointestinal: Negative for abdominal pain, constipation, diarrhea, nausea and vomiting.   Musculoskeletal: Negative for arthralgias.   Neurological: Positive for numbness and headaches. Negative for dizziness, weakness and light-headedness.         Objective:   There were no vitals filed for this visit.    Past Medical History:   Diagnosis Date    Abnormal Pap smear     Androgenic alopecia     Arthritis     Celiac disease     Chronic rhinitis     Decreased fetal movement 4/10/2019    Hair loss 2017    Headache(784.0)     IBS (irritable bowel syndrome)     Irregular uterine contractions 2019    Preeclampsia 2019     Past Surgical History:   Procedure Laterality Date     SECTION       SECTION N/A 2019    Procedure:  SECTION;  Surgeon: Cee Joaquin MD;  [facility];  Service: OB/GYN;  Laterality: N/A;    COLONOSCOPY      Dr. Tyson: normal  findings per patient report    ESOPHAGOGASTRODUODENOSCOPY N/A 9/28/2020    Procedure: EGD (ESOPHAGOGASTRODUODENOSCOPY);  Surgeon: Jaya Villaseñor MD;  Location: [facility] ENDO;  Service: Endoscopy;  Laterality: N/A;    EXTERNAL EAR SURGERY      for cauliflower ear    LAPAROSCOPIC CHOLECYSTECTOMY N/A 10/14/2020    Procedure: CHOLECYSTECTOMY, LAPAROSCOPIC;  Surgeon: Tad Ramirez MD;  Location: [facility] OR;  Service: General;  Laterality: N/A;    Reconstructive ankly surgery      TONSILLECTOMY, ADENOIDECTOMY      UPPER GASTROINTESTINAL ENDOSCOPY  2011    Dr. Tyson: normal findings per patient report          Physical Exam   Constitutional: She is oriented to person, place, and time.   Neurological: She is oriented to person, place, and time.       Recent labs reviewed:  Component      Latest Ref Rng & Units 7/17/2020   IgG 1      382 - 929 mg/dL 530   IgG 2      242 - 700 mg/dL 456   IgG 3      22 - 176 mg/dL 185 (H)   IgG 4      4 - 86 mg/dL 34   CRP      0.0 - 8.2 mg/L 6.7   Sed Rate      0 - 20 mm/Hr 20   IgA      40 - 350 mg/dL 167   IgE      0 - 100 IU/mL <35   IgG - Serum      650 - 1600 mg/dL 1189   IgM      50 - 300 mg/dL 99     Assessment:       1. Reactive arthritis    2. NCGS (non-celiac gluten sensitivity)    3. CRP elevated    4. Breann-Barr virus seropositivity    5. Hair loss    6. Irritable bowel syndrome, unspecified type            Plan:       Reactive arthritis  -     CBC Auto Differential; Future; Expected date: 11/08/2020  -     Comprehensive Metabolic Panel; Future; Expected date: 11/08/2020  -     C-Reactive Protein; Future; Expected date: 11/08/2020  -     Sedimentation rate; Future; Expected date: 11/08/2020    NCGS (non-celiac gluten sensitivity)  -     CBC Auto Differential; Future; Expected date: 11/08/2020  -     Comprehensive Metabolic Panel; Future; Expected date: 11/08/2020  -     C-Reactive Protein; Future; Expected date: 11/08/2020  -     Sedimentation rate; Future; Expected  date: 11/08/2020    CRP elevated  -     CBC Auto Differential; Future; Expected date: 11/08/2020  -     Comprehensive Metabolic Panel; Future; Expected date: 11/08/2020  -     C-Reactive Protein; Future; Expected date: 11/08/2020  -     Sedimentation rate; Future; Expected date: 11/08/2020    Breann-Barr virus seropositivity  -     CBC Auto Differential; Future; Expected date: 11/08/2020  -     Comprehensive Metabolic Panel; Future; Expected date: 11/08/2020  -     C-Reactive Protein; Future; Expected date: 11/08/2020  -     Sedimentation rate; Future; Expected date: 11/08/2020    Hair loss  -     CBC Auto Differential; Future; Expected date: 11/08/2020  -     Comprehensive Metabolic Panel; Future; Expected date: 11/08/2020  -     C-Reactive Protein; Future; Expected date: 11/08/2020  -     Sedimentation rate; Future; Expected date: 11/08/2020    Irritable bowel syndrome, unspecified type  -     CBC Auto Differential; Future; Expected date: 11/08/2020  -     Comprehensive Metabolic Panel; Future; Expected date: 11/08/2020  -     C-Reactive Protein; Future; Expected date: 11/08/2020  -     Sedimentation rate; Future; Expected date: 11/08/2020

## 2020-11-13 ENCOUNTER — TELEPHONE (OUTPATIENT)
Dept: PHARMACY | Facility: CLINIC | Age: 30
End: 2020-11-13

## 2020-11-13 ENCOUNTER — SPECIALTY PHARMACY (OUTPATIENT)
Dept: PHARMACY | Facility: CLINIC | Age: 30
End: 2020-11-13

## 2020-11-13 RX ORDER — GALCANEZUMAB 120 MG/ML
120 INJECTION, SOLUTION SUBCUTANEOUS
Qty: 3 ML | Refills: 11 | Status: SHIPPED | OUTPATIENT
Start: 2020-11-13 | End: 2022-11-25

## 2020-11-13 NOTE — TELEPHONE ENCOUNTER
Specialty Pharmacy - Refill Coordination    Specialty Medication Orders Linked to Encounter      Most Recent Value   Medication #1  galcanezumab-gnlm (EMGALITY PEN) 120 mg/mL PnIj (Order#[id number], Rx#[id number])          Refill Questions - Documented Responses      Most Recent Value   Relationship to patient of person spoken to?  Self   HIPAA/medical authority confirmed?  Yes   Any changes in contact preferences or allowed representatives?  No   Has the patient had any insurance changes?  No   Has the patient had any changes to specialty medication, dose, or instructions?  No   Has the patient started taking any new medications, herbals, or supplements?  No   Has the patient been diagnosed with any new medical conditions?  No   Does the patient have any new allergies to medications or foods?  No   Does the patient have any concerns about side effects?  No   Can the patient store medication/sharps container properly (at the correct temperature, away from children/pets, etc.)?  Yes   Can the patient call emergency services (911) in the event of an emergency?  Yes   Does the patient have any concerns or questions about taking or administering this medication as prescribed?  No   How many doses did the patient miss in the past 4 weeks or since the last fill?  0   How many doses does the patient have on hand?  0   How many days does the patient report on hand quantity will last?  0   Does the number of doses/days supply remaining match pharmacy expected amounts?  Yes   Does the patient feel that this medication is effective?  Yes   During the past 4 weeks, has patient missed any activities due to condition or medication?  No   During the past 4 weeks, did patient have any of the following urgent care visits?  None   How will the patient receive the medication?  Mail   When does the patient need to receive the medication?  11/17/20   Shipping Address  Home   Address in [facility] confirmed and updated if  neccessary?  Yes   Expected Copay ($)  0   Payment Method  zero copay   Days supply of Refill  28   Would patient like to speak to a pharmacist?  No   Do you want to trigger an intervention?  No   Do you want to trigger an additional referral task?  No   Refill activity completed?  Yes   Refill activity plan  Refill scheduled   Shipment/Pickup Date:  11/16/20          Current Outpatient Medications   Medication Sig    diphenhydrAMINE (BENADRYL) 25 mg capsule Take 25 mg by mouth nightly as needed.    galcanezumab-gnlm (EMGALITY PEN) 120 mg/mL PnIj Inject 1 pen (120 mg total) into the skin every 28 days.    HYDROcodone-acetaminophen (NORCO) 5-325 mg per tablet Take 1 tablet by mouth every 6 (six) hours as needed for Pain.    loratadine (CLARITIN) 10 mg tablet Take 10 mg by mouth once daily.    norelgestromin-ethinyl estradiol (ORTHO EVRA) 150-35 mcg/24 hr Place 1 patch onto the skin once a week.    rizatriptan (MAXALT) 10 MG tablet Take 1 tab at onset of migraine.  May repeat 1 tab in 2 hours if needed.  Limit to 3 tabs/week.   Last reviewed on 11/13/2020  4:38 PM by Jodi Sosa    Review of patient's allergies indicates:   Allergen Reactions    Vancomycin analogues Swelling and Rash    Ciprofloxacin Swelling    Monistat 1 (tioconazole) [tioconazole] Hives    Last reviewed on  11/13/2020 4:38 PM by Jodi Sosa      Tasks added this encounter   12/6/2020 - Refill Call (Auto Added)   Tasks due within next 3 months   No tasks due.     Jodi Sosa  [facility]  [address] [place] [id number]  Phone: [phone]  Fax: [phone]

## 2020-12-09 ENCOUNTER — SPECIALTY PHARMACY (OUTPATIENT)
Dept: PHARMACY | Facility: CLINIC | Age: 30
End: 2020-12-09

## 2020-12-09 NOTE — TELEPHONE ENCOUNTER
Specialty Pharmacy - Refill Coordination    Specialty Medication Orders Linked to Encounter      Most Recent Value   Medication #1  galcanezumab-gnlm (EMGALITY PEN) 120 mg/mL PnIj (Order#[id number], Rx#[id number])          Refill Questions - Documented Responses      Most Recent Value   Relationship to patient of person spoken to?  Self   HIPAA/medical authority confirmed?  Yes   How will the patient receive the medication?  Mail   When does the patient need to receive the medication?  12/15/20   Shipping Address  Home   Address in [facility] confirmed and updated if neccessary?  Yes   Expected Copay ($)  0   Is the patient able to afford the medication copay?  Yes   Payment Method  zero copay   Days supply of Refill  28   Would patient like to speak to a pharmacist?  No   Do you want to trigger an intervention?  No   Do you want to trigger an additional referral task?  No   Refill activity completed?  Yes   Refill activity plan  Refill scheduled   Shipment/Pickup Date:  12/14/20          Current Outpatient Medications   Medication Sig    diphenhydrAMINE (BENADRYL) 25 mg capsule Take 25 mg by mouth nightly as needed.    galcanezumab-gnlm (EMGALITY PEN) 120 mg/mL PnIj Inject 1 pen (120 mg total) into the skin every 28 days.    HYDROcodone-acetaminophen (NORCO) 5-325 mg per tablet Take 1 tablet by mouth every 6 (six) hours as needed for Pain.    loratadine (CLARITIN) 10 mg tablet Take 10 mg by mouth once daily.    norelgestromin-ethinyl estradiol (ORTHO EVRA) 150-35 mcg/24 hr Place 1 patch onto the skin once a week.    rizatriptan (MAXALT) 10 MG tablet Take 1 tab at onset of migraine.  May repeat 1 tab in 2 hours if needed.  Limit to 3 tabs/week.   Last reviewed on 11/13/2020  4:38 PM by Jodi Samuels    Review of patient's allergies indicates:   Allergen Reactions    Vancomycin analogues Swelling and Rash    Ciprofloxacin Swelling    Monistat 1 (tioconazole) [tioconazole] Hives    Last reviewed on   11/13/2020 4:38 PM by Jodi Sosa      Tasks added this encounter   1/1/2021 - Refill Call (Auto Added)   Tasks due within next 3 months   No tasks due.     Monica Herrera  [facility]  [address] [place] [id number]  Phone: [phone]  Fax: [phone]

## 2021-01-05 ENCOUNTER — OFFICE VISIT (OUTPATIENT)
Dept: FAMILY MEDICINE | Facility: CLINIC | Age: 31
End: 2021-01-05
Payer: COMMERCIAL

## 2021-01-05 ENCOUNTER — LAB VISIT (OUTPATIENT)
Dept: FAMILY MEDICINE | Facility: CLINIC | Age: 31
End: 2021-01-05
Payer: COMMERCIAL

## 2021-01-05 DIAGNOSIS — R51.9 NONINTRACTABLE HEADACHE, UNSPECIFIED CHRONICITY PATTERN, UNSPECIFIED HEADACHE TYPE: ICD-10-CM

## 2021-01-05 DIAGNOSIS — R53.83 FATIGUE, UNSPECIFIED TYPE: ICD-10-CM

## 2021-01-05 DIAGNOSIS — R51.9 NONINTRACTABLE HEADACHE, UNSPECIFIED CHRONICITY PATTERN, UNSPECIFIED HEADACHE TYPE: Primary | ICD-10-CM

## 2021-01-05 PROCEDURE — 99213 PR OFFICE/OUTPT VISIT, EST, LEVL III, 20-29 MIN: ICD-10-PCS | Mod: 95,,, | Performed by: NURSE PRACTITIONER

## 2021-01-05 PROCEDURE — 99213 OFFICE O/P EST LOW 20 MIN: CPT | Mod: 95,,, | Performed by: NURSE PRACTITIONER

## 2021-01-05 PROCEDURE — U0003 INFECTIOUS AGENT DETECTION BY NUCLEIC ACID (DNA OR RNA); SEVERE ACUTE RESPIRATORY SYNDROME CORONAVIRUS 2 (SARS-COV-2) (CORONAVIRUS DISEASE [COVID-19]), AMPLIFIED PROBE TECHNIQUE, MAKING USE OF HIGH THROUGHPUT TECHNOLOGIES AS DESCRIBED BY CMS-2020-01-R: HCPCS

## 2021-01-06 ENCOUNTER — PATIENT MESSAGE (OUTPATIENT)
Dept: FAMILY MEDICINE | Facility: CLINIC | Age: 31
End: 2021-01-06

## 2021-01-06 LAB — SARS-COV-2 RNA RESP QL NAA+PROBE: NOT DETECTED

## 2021-01-25 ENCOUNTER — SPECIALTY PHARMACY (OUTPATIENT)
Dept: PHARMACY | Facility: CLINIC | Age: 31
End: 2021-01-25

## 2021-02-07 ENCOUNTER — PATIENT MESSAGE (OUTPATIENT)
Dept: RHEUMATOLOGY | Facility: CLINIC | Age: 31
End: 2021-02-07

## 2021-02-10 ENCOUNTER — CLINICAL SUPPORT (OUTPATIENT)
Dept: RHEUMATOLOGY | Facility: CLINIC | Age: 31
End: 2021-02-10
Payer: COMMERCIAL

## 2021-02-10 VITALS
DIASTOLIC BLOOD PRESSURE: 76 MMHG | HEIGHT: 67 IN | WEIGHT: 147.06 LBS | SYSTOLIC BLOOD PRESSURE: 108 MMHG | HEART RATE: 89 BPM | BODY MASS INDEX: 23.08 KG/M2

## 2021-02-10 DIAGNOSIS — M02.30 REACTIVE ARTHRITIS: Primary | ICD-10-CM

## 2021-02-10 PROCEDURE — 99999 PR PBB SHADOW E&M-EST. PATIENT-LVL III: CPT | Mod: PBBFAC,,,

## 2021-02-10 PROCEDURE — 96372 THER/PROPH/DIAG INJ SC/IM: CPT | Mod: S$GLB,,, | Performed by: PHYSICIAN ASSISTANT

## 2021-02-10 PROCEDURE — 99999 PR PBB SHADOW E&M-EST. PATIENT-LVL III: ICD-10-PCS | Mod: PBBFAC,,,

## 2021-02-10 PROCEDURE — 96372 PR INJECTION,THERAP/PROPH/DIAG2ST, IM OR SUBCUT: ICD-10-PCS | Mod: S$GLB,,, | Performed by: PHYSICIAN ASSISTANT

## 2021-02-10 RX ORDER — KETOROLAC TROMETHAMINE 30 MG/ML
60 INJECTION, SOLUTION INTRAMUSCULAR; INTRAVENOUS
Status: COMPLETED | OUTPATIENT
Start: 2021-02-10 | End: 2021-02-10

## 2021-02-10 RX ORDER — METHYLPREDNISOLONE ACETATE 80 MG/ML
80 INJECTION, SUSPENSION INTRA-ARTICULAR; INTRALESIONAL; INTRAMUSCULAR; SOFT TISSUE
Status: COMPLETED | OUTPATIENT
Start: 2021-02-10 | End: 2021-02-10

## 2021-02-10 RX ADMIN — KETOROLAC TROMETHAMINE 60 MG: 30 INJECTION, SOLUTION INTRAMUSCULAR; INTRAVENOUS at 11:02

## 2021-02-10 RX ADMIN — METHYLPREDNISOLONE ACETATE 80 MG: 80 INJECTION, SUSPENSION INTRA-ARTICULAR; INTRALESIONAL; INTRAMUSCULAR; SOFT TISSUE at 11:02

## 2021-03-09 DIAGNOSIS — M21.619 BUNION: Primary | ICD-10-CM

## 2021-03-12 ENCOUNTER — HOSPITAL ENCOUNTER (OUTPATIENT)
Dept: RADIOLOGY | Facility: HOSPITAL | Age: 31
Discharge: HOME | End: 2021-03-12
Attending: PODIATRIST
Payer: COMMERCIAL

## 2021-03-12 DIAGNOSIS — M21.619 BUNION: ICD-10-CM

## 2021-03-12 PROCEDURE — 73630 X-RAY EXAM OF FOOT: CPT | Mod: 26,50,, | Performed by: RADIOLOGY

## 2021-03-12 PROCEDURE — 73630 XR FOOT COMPLETE 3 VIEW BILATERAL: ICD-10-PCS | Mod: 26,50,, | Performed by: RADIOLOGY

## 2021-03-12 PROCEDURE — 73630 X-RAY EXAM OF FOOT: CPT | Mod: TC,50,FY,PO

## 2021-04-04 ENCOUNTER — PATIENT MESSAGE (OUTPATIENT)
Dept: NEUROLOGY | Facility: CLINIC | Age: 31
End: 2021-04-04

## 2021-04-05 RX ORDER — RIZATRIPTAN BENZOATE 10 MG/1
TABLET ORAL
Qty: 9 TABLET | Refills: 6 | Status: SHIPPED | OUTPATIENT
Start: 2021-04-05 | End: 2021-05-27 | Stop reason: SDUPTHER

## 2021-04-07 ENCOUNTER — CLINICAL SUPPORT (OUTPATIENT)
Dept: URGENT CARE | Facility: CLINIC | Age: 31
End: 2021-04-07
Payer: COMMERCIAL

## 2021-04-07 DIAGNOSIS — Z01.89 ENCOUNTER FOR LABORATORY TEST: Primary | ICD-10-CM

## 2021-04-07 LAB
CTP QC/QA: YES
SARS-COV-2 RDRP RESP QL NAA+PROBE: NEGATIVE

## 2021-04-07 PROCEDURE — 99211 PR OFFICE/OUTPT VISIT, EST, LEVL I: ICD-10-PCS | Mod: S$GLB,CS,, | Performed by: FAMILY MEDICINE

## 2021-04-07 PROCEDURE — U0002 COVID-19 LAB TEST NON-CDC: HCPCS | Mod: QW,S$GLB,, | Performed by: FAMILY MEDICINE

## 2021-04-07 PROCEDURE — U0002: ICD-10-PCS | Mod: QW,S$GLB,, | Performed by: FAMILY MEDICINE

## 2021-04-07 PROCEDURE — 99211 OFF/OP EST MAY X REQ PHY/QHP: CPT | Mod: S$GLB,CS,, | Performed by: FAMILY MEDICINE

## 2021-04-09 ENCOUNTER — OFFICE VISIT (OUTPATIENT)
Dept: FAMILY MEDICINE | Facility: CLINIC | Age: 31
End: 2021-04-09
Payer: COMMERCIAL

## 2021-04-09 DIAGNOSIS — J01.00 ACUTE MAXILLARY SINUSITIS, RECURRENCE NOT SPECIFIED: ICD-10-CM

## 2021-04-09 DIAGNOSIS — J30.9 ALLERGIC RHINITIS, UNSPECIFIED SEASONALITY, UNSPECIFIED TRIGGER: ICD-10-CM

## 2021-04-09 DIAGNOSIS — J20.9 BRONCHITIS WITH BRONCHOSPASM: Primary | ICD-10-CM

## 2021-04-09 PROCEDURE — 99213 PR OFFICE/OUTPT VISIT, EST, LEVL III, 20-29 MIN: ICD-10-PCS | Mod: 95,,, | Performed by: NURSE PRACTITIONER

## 2021-04-09 PROCEDURE — 99213 OFFICE O/P EST LOW 20 MIN: CPT | Mod: 95,,, | Performed by: NURSE PRACTITIONER

## 2021-04-09 RX ORDER — AMOXICILLIN 500 MG/1
500 CAPSULE ORAL EVERY 12 HOURS
Qty: 20 CAPSULE | Refills: 0 | Status: SHIPPED | OUTPATIENT
Start: 2021-04-09 | End: 2021-06-08

## 2021-04-09 RX ORDER — PREDNISONE 20 MG/1
TABLET ORAL
Qty: 7 TABLET | Refills: 0 | Status: SHIPPED | OUTPATIENT
Start: 2021-04-09 | End: 2021-06-08

## 2021-04-26 DIAGNOSIS — M21.619 BUNION: Primary | ICD-10-CM

## 2021-04-27 ENCOUNTER — HOSPITAL ENCOUNTER (OUTPATIENT)
Dept: RADIOLOGY | Facility: HOSPITAL | Age: 31
Discharge: HOME | End: 2021-04-27
Attending: PODIATRIST
Payer: COMMERCIAL

## 2021-04-27 ENCOUNTER — OFFICE VISIT (OUTPATIENT)
Dept: PODIATRY | Facility: CLINIC | Age: 31
End: 2021-04-27
Payer: COMMERCIAL

## 2021-04-27 DIAGNOSIS — M20.5X1 ACQUIRED ADDUCTOVARUS ROTATION OF TOE OF RIGHT FOOT: ICD-10-CM

## 2021-04-27 DIAGNOSIS — M20.5X2 ACQUIRED ADDUCTOVARUS ROTATION OF TOE OF LEFT FOOT: ICD-10-CM

## 2021-04-27 DIAGNOSIS — Q66.221 METATARSUS ADDUCTUS OF BOTH FEET: ICD-10-CM

## 2021-04-27 DIAGNOSIS — Q66.222 METATARSUS ADDUCTUS OF BOTH FEET: ICD-10-CM

## 2021-04-27 DIAGNOSIS — M79.674 PAIN IN TOES OF BOTH FEET: Primary | ICD-10-CM

## 2021-04-27 DIAGNOSIS — M79.675 PAIN IN TOES OF BOTH FEET: Primary | ICD-10-CM

## 2021-04-27 DIAGNOSIS — M21.619 BUNION: ICD-10-CM

## 2021-04-27 PROCEDURE — 99999 PR PBB SHADOW E&M-EST. PATIENT-LVL II: ICD-10-PCS | Mod: PBBFAC,,, | Performed by: PODIATRIST

## 2021-04-27 PROCEDURE — 99999 PR PBB SHADOW E&M-EST. PATIENT-LVL II: CPT | Mod: PBBFAC,,, | Performed by: PODIATRIST

## 2021-04-27 PROCEDURE — 99243 OFF/OP CNSLTJ NEW/EST LOW 30: CPT | Mod: S$GLB,,, | Performed by: PODIATRIST

## 2021-04-27 PROCEDURE — 73630 X-RAY EXAM OF FOOT: CPT | Mod: 26,RT,, | Performed by: RADIOLOGY

## 2021-04-27 PROCEDURE — 73630 XR FOOT COMPLETE 3 VIEW BILATERAL: ICD-10-PCS | Mod: 26,RT,, | Performed by: RADIOLOGY

## 2021-04-27 PROCEDURE — 99243 PR OFFICE CONSULTATION,LEVEL III: ICD-10-PCS | Mod: S$GLB,,, | Performed by: PODIATRIST

## 2021-04-27 PROCEDURE — 73630 X-RAY EXAM OF FOOT: CPT | Mod: 26,LT,, | Performed by: RADIOLOGY

## 2021-04-27 PROCEDURE — 73630 X-RAY EXAM OF FOOT: CPT | Mod: TC,50,PO

## 2021-05-10 ENCOUNTER — PATIENT MESSAGE (OUTPATIENT)
Dept: RESEARCH | Facility: HOSPITAL | Age: 31
End: 2021-05-10

## 2021-06-08 ENCOUNTER — OFFICE VISIT (OUTPATIENT)
Dept: OTOLARYNGOLOGY | Facility: CLINIC | Age: 31
End: 2021-06-08
Payer: COMMERCIAL

## 2021-06-08 ENCOUNTER — TELEPHONE (OUTPATIENT)
Dept: OTOLARYNGOLOGY | Facility: CLINIC | Age: 31
End: 2021-06-08

## 2021-06-08 ENCOUNTER — LAB VISIT (OUTPATIENT)
Dept: LAB | Facility: HOSPITAL | Age: 31
End: 2021-06-08
Attending: NURSE PRACTITIONER
Payer: COMMERCIAL

## 2021-06-08 VITALS — WEIGHT: 139.75 LBS | HEIGHT: 67 IN | BODY MASS INDEX: 21.93 KG/M2

## 2021-06-08 DIAGNOSIS — R09.81 CHRONIC NASAL CONGESTION: Primary | ICD-10-CM

## 2021-06-08 DIAGNOSIS — R09.81 CHRONIC NASAL CONGESTION: ICD-10-CM

## 2021-06-08 DIAGNOSIS — J34.1 MUCOUS RETENTION CYST OF MAXILLARY SINUS: ICD-10-CM

## 2021-06-08 DIAGNOSIS — G43.019 INTRACTABLE MIGRAINE WITHOUT AURA AND WITHOUT STATUS MIGRAINOSUS: ICD-10-CM

## 2021-06-08 PROCEDURE — 86003 ALLG SPEC IGE CRUDE XTRC EA: CPT | Mod: 59 | Performed by: NURSE PRACTITIONER

## 2021-06-08 PROCEDURE — 92511 PR NASOPHARYNGOSCOPY: ICD-10-PCS | Mod: S$GLB,,, | Performed by: NURSE PRACTITIONER

## 2021-06-08 PROCEDURE — 86003 ALLG SPEC IGE CRUDE XTRC EA: CPT | Performed by: NURSE PRACTITIONER

## 2021-06-08 PROCEDURE — 99203 OFFICE O/P NEW LOW 30 MIN: CPT | Mod: 25,S$GLB,, | Performed by: NURSE PRACTITIONER

## 2021-06-08 PROCEDURE — 99999 PR PBB SHADOW E&M-EST. PATIENT-LVL III: CPT | Mod: PBBFAC,,, | Performed by: NURSE PRACTITIONER

## 2021-06-08 PROCEDURE — 99203 PR OFFICE/OUTPT VISIT, NEW, LEVL III, 30-44 MIN: ICD-10-PCS | Mod: 25,S$GLB,, | Performed by: NURSE PRACTITIONER

## 2021-06-08 PROCEDURE — 92511 NASOPHARYNGOSCOPY: CPT | Mod: S$GLB,,, | Performed by: NURSE PRACTITIONER

## 2021-06-08 PROCEDURE — 99999 PR PBB SHADOW E&M-EST. PATIENT-LVL III: ICD-10-PCS | Mod: PBBFAC,,, | Performed by: NURSE PRACTITIONER

## 2021-06-08 RX ORDER — FLUTICASONE PROPIONATE 50 MCG
1 SPRAY, SUSPENSION (ML) NASAL 2 TIMES DAILY
Qty: 16 G | Refills: 12 | Status: SHIPPED | OUTPATIENT
Start: 2021-06-08 | End: 2022-06-08

## 2021-06-08 RX ORDER — AZELASTINE 1 MG/ML
1 SPRAY, METERED NASAL 2 TIMES DAILY
Qty: 30 ML | Refills: 12 | Status: SHIPPED | OUTPATIENT
Start: 2021-06-08 | End: 2023-08-10 | Stop reason: ALTCHOICE

## 2021-06-08 RX ORDER — PREGABALIN 50 MG/1
50 CAPSULE ORAL 2 TIMES DAILY
COMMUNITY
Start: 2021-06-03 | End: 2022-11-25

## 2021-06-09 ENCOUNTER — TELEPHONE (OUTPATIENT)
Dept: RADIOLOGY | Facility: HOSPITAL | Age: 31
End: 2021-06-09

## 2021-06-10 LAB
AMER SYCAMORE IGE QN: <0.35 KU/L
FEATHER PANEL #2: <0.35 KU/L

## 2021-06-11 LAB
A ALTERNATA IGE QN: <0.1 KU/L
A FUMIGATUS IGE QN: <0.1 KU/L
ALLERGEN BOXELDER MAPLE TREE IGE: <0.1 KU/L
ALLERGEN MAPLE (BOX ELDER) CLASS: NORMAL
ALLERGEN MULBERRY CLASS: NORMAL
ALLERGEN MULBERRY TREE IGE: <0.1 KU/L
ALLERGEN PIGWEED IGE: <0.1 KU/L
ALLERGEN WHITE ASH TREE IGE: <0.1 KU/L
BALD CYPRESS IGE QN: <0.1 KU/L
BERMUDA GRASS IGE QN: <0.1 KU/L
C HERBARUM IGE QN: <0.1 KU/L
CAT DANDER IGE QN: <0.1 KU/L
CEDAR IGE QN: <0.1 KU/L
COCKLEBUR IGE QN: <0.1 KU/L
COMMON PIGWEED CLASS: NORMAL
COMMON RAGWEED IGE QN: <0.1 KU/L
D FARINAE IGE QN: <0.1 KU/L
D PTERONYSS IGE QN: <0.1 KU/L
DEPRECATED A ALTERNATA IGE RAST QL: NORMAL
DEPRECATED A FUMIGATUS IGE RAST QL: NORMAL
DEPRECATED BALD CYPRESS IGE RAST QL: NORMAL
DEPRECATED BERMUDA GRASS IGE RAST QL: NORMAL
DEPRECATED C HERBARUM IGE RAST QL: NORMAL
DEPRECATED CAT DANDER IGE RAST QL: NORMAL
DEPRECATED CEDAR IGE RAST QL: NORMAL
DEPRECATED COCKLEBUR IGE RAST QL: NORMAL
DEPRECATED COMMON RAGWEED IGE RAST QL: NORMAL
DEPRECATED D FARINAE IGE RAST QL: NORMAL
DEPRECATED D PTERONYSS IGE RAST QL: NORMAL
DEPRECATED DOG DANDER IGE RAST QL: NORMAL
DEPRECATED ELDER IGE RAST QL: NORMAL
DEPRECATED ENGL PLANTAIN IGE RAST QL: NORMAL
DEPRECATED GOOSEFOOT IGE RAST QL: NORMAL
DEPRECATED JOHNSON GRASS IGE RAST QL: NORMAL
DEPRECATED KENT BLUE GRASS IGE RAST QL: NORMAL
DEPRECATED M RACEMOSUS IGE RAST QL: NORMAL
DEPRECATED MUGWORT IGE RAST QL: NORMAL
DEPRECATED NETTLE IGE RAST QL: NORMAL
DEPRECATED P NOTATUM IGE RAST QL: NORMAL
DEPRECATED PECAN/HICK TREE IGE RAST QL: NORMAL
DEPRECATED ROACH IGE RAST QL: NORMAL
DEPRECATED SHEEP SORREL IGE RAST QL: NORMAL
DEPRECATED SILVER BIRCH IGE RAST QL: NORMAL
DEPRECATED TIMOTHY IGE RAST QL: NORMAL
DEPRECATED WHITE OAK IGE RAST QL: NORMAL
DOG DANDER IGE QN: <0.1 KU/L
ELDER IGE QN: <0.1 KU/L
ELM CEDAR CLASS: NORMAL
ELM CEDAR, IGE: <0.1 KU/L
ENGL PLANTAIN IGE QN: <0.1 KU/L
GOOSEFOOT IGE QN: <0.1 KU/L
JOHNSON GRASS IGE QN: <0.1 KU/L
KENT BLUE GRASS IGE QN: <0.1 KU/L
M RACEMOSUS IGE QN: <0.1 KU/L
MUGWORT IGE QN: <0.1 KU/L
NETTLE IGE QN: <0.1 KU/L
P NOTATUM IGE QN: <0.1 KU/L
PECAN/HICK TREE IGE QN: <0.1 KU/L
ROACH IGE QN: <0.1 KU/L
SHEEP SORREL IGE QN: <0.1 KU/L
SILVER BIRCH IGE QN: <0.1 KU/L
TIMOTHY IGE QN: <0.1 KU/L
WHITE ASH CLASS: NORMAL
WHITE OAK IGE QN: <0.1 KU/L

## 2021-07-12 PROBLEM — M47.812 OSTEOARTHRITIS OF CERVICAL SPINE: Status: ACTIVE | Noted: 2021-07-12

## 2021-08-03 ENCOUNTER — TELEPHONE (OUTPATIENT)
Dept: PHARMACY | Facility: CLINIC | Age: 31
End: 2021-08-03

## 2021-08-25 ENCOUNTER — OFFICE VISIT (OUTPATIENT)
Dept: FAMILY MEDICINE | Facility: CLINIC | Age: 31
End: 2021-08-25
Payer: COMMERCIAL

## 2021-08-25 ENCOUNTER — LAB VISIT (OUTPATIENT)
Dept: FAMILY MEDICINE | Facility: CLINIC | Age: 31
End: 2021-08-25
Payer: COMMERCIAL

## 2021-08-25 DIAGNOSIS — R50.9 FEVER, UNSPECIFIED FEVER CAUSE: ICD-10-CM

## 2021-08-25 DIAGNOSIS — R50.9 FEVER, UNSPECIFIED FEVER CAUSE: Primary | ICD-10-CM

## 2021-08-25 DIAGNOSIS — R05.9 COUGH: ICD-10-CM

## 2021-08-25 PROCEDURE — U0003 INFECTIOUS AGENT DETECTION BY NUCLEIC ACID (DNA OR RNA); SEVERE ACUTE RESPIRATORY SYNDROME CORONAVIRUS 2 (SARS-COV-2) (CORONAVIRUS DISEASE [COVID-19]), AMPLIFIED PROBE TECHNIQUE, MAKING USE OF HIGH THROUGHPUT TECHNOLOGIES AS DESCRIBED BY CMS-2020-01-R: HCPCS | Performed by: NURSE PRACTITIONER

## 2021-08-25 PROCEDURE — 99213 PR OFFICE/OUTPT VISIT, EST, LEVL III, 20-29 MIN: ICD-10-PCS | Mod: 95,,, | Performed by: NURSE PRACTITIONER

## 2021-08-25 PROCEDURE — U0005 INFEC AGEN DETEC AMPLI PROBE: HCPCS | Performed by: NURSE PRACTITIONER

## 2021-08-25 PROCEDURE — 99213 OFFICE O/P EST LOW 20 MIN: CPT | Mod: 95,,, | Performed by: NURSE PRACTITIONER

## 2021-08-26 LAB
SARS-COV-2 RNA RESP QL NAA+PROBE: NOT DETECTED
SARS-COV-2- CYCLE NUMBER: NORMAL

## 2021-11-10 ENCOUNTER — OFFICE VISIT (OUTPATIENT)
Dept: FAMILY MEDICINE | Facility: CLINIC | Age: 31
End: 2021-11-10
Payer: COMMERCIAL

## 2021-11-10 VITALS
HEART RATE: 74 BPM | BODY MASS INDEX: 23.18 KG/M2 | WEIGHT: 147.69 LBS | TEMPERATURE: 98 F | OXYGEN SATURATION: 100 % | SYSTOLIC BLOOD PRESSURE: 116 MMHG | DIASTOLIC BLOOD PRESSURE: 72 MMHG | HEIGHT: 67 IN

## 2021-11-10 DIAGNOSIS — J32.9 SINUSITIS, UNSPECIFIED CHRONICITY, UNSPECIFIED LOCATION: Primary | ICD-10-CM

## 2021-11-10 DIAGNOSIS — M94.0 COSTOCHONDRITIS: ICD-10-CM

## 2021-11-10 PROBLEM — J98.8 VIRAL RESPIRATORY ILLNESS: Status: RESOLVED | Noted: 2020-05-06 | Resolved: 2021-11-10

## 2021-11-10 PROBLEM — R10.9 ABDOMINAL PAIN: Status: RESOLVED | Noted: 2020-09-28 | Resolved: 2021-11-10

## 2021-11-10 PROBLEM — B97.89 VIRAL RESPIRATORY ILLNESS: Status: RESOLVED | Noted: 2020-05-06 | Resolved: 2021-11-10

## 2021-11-10 PROCEDURE — 99214 OFFICE O/P EST MOD 30 MIN: CPT | Mod: S$GLB,,, | Performed by: NURSE PRACTITIONER

## 2021-11-10 PROCEDURE — 99214 PR OFFICE/OUTPT VISIT, EST, LEVL IV, 30-39 MIN: ICD-10-PCS | Mod: S$GLB,,, | Performed by: NURSE PRACTITIONER

## 2021-11-10 RX ORDER — PREDNISONE 20 MG/1
40 TABLET ORAL DAILY
Qty: 20 TABLET | Refills: 0 | Status: SHIPPED | OUTPATIENT
Start: 2021-11-10 | End: 2021-11-20

## 2021-11-10 RX ORDER — CYCLOBENZAPRINE HCL 10 MG
10 TABLET ORAL 3 TIMES DAILY
COMMUNITY
Start: 2021-10-18 | End: 2022-11-25

## 2021-11-10 RX ORDER — AMOXICILLIN AND CLAVULANATE POTASSIUM 875; 125 MG/1; MG/1
1 TABLET, FILM COATED ORAL 2 TIMES DAILY
Qty: 14 TABLET | Refills: 0 | Status: SHIPPED | OUTPATIENT
Start: 2021-11-10 | End: 2021-11-17

## 2021-11-10 RX ORDER — PREGABALIN 75 MG/1
75 CAPSULE ORAL 2 TIMES DAILY
COMMUNITY
Start: 2021-10-18 | End: 2022-11-25

## 2022-03-10 LAB
HUMAN PAPILLOMAVIRUS (HPV): NORMAL
PAP RECOMMENDATION EXT: NORMAL
PAP SMEAR: NORMAL

## 2022-11-17 PROBLEM — O34.211 ENCOUNTER FOR MATERNAL CARE FOR LOW TRANSVERSE SCAR FROM REPEAT CESAREAN DELIVERY: Status: ACTIVE | Noted: 2022-11-17

## 2022-12-30 ENCOUNTER — OFFICE VISIT (OUTPATIENT)
Dept: FAMILY MEDICINE | Facility: CLINIC | Age: 32
End: 2022-12-30
Payer: COMMERCIAL

## 2022-12-30 ENCOUNTER — PATIENT MESSAGE (OUTPATIENT)
Dept: FAMILY MEDICINE | Facility: CLINIC | Age: 32
End: 2022-12-30

## 2022-12-30 VITALS
RESPIRATION RATE: 18 BRPM | HEIGHT: 67 IN | BODY MASS INDEX: 22.61 KG/M2 | OXYGEN SATURATION: 98 % | TEMPERATURE: 98 F | WEIGHT: 144.06 LBS | DIASTOLIC BLOOD PRESSURE: 70 MMHG | SYSTOLIC BLOOD PRESSURE: 112 MMHG | HEART RATE: 126 BPM

## 2022-12-30 DIAGNOSIS — J01.00 ACUTE MAXILLARY SINUSITIS, RECURRENCE NOT SPECIFIED: Primary | ICD-10-CM

## 2022-12-30 PROBLEM — Z78.9 BREASTFEEDING (INFANT): Status: ACTIVE | Noted: 2022-12-30

## 2022-12-30 PROCEDURE — 1160F PR REVIEW ALL MEDS BY PRESCRIBER/CLIN PHARMACIST DOCUMENTED: ICD-10-PCS | Mod: CPTII,S$GLB,, | Performed by: NURSE PRACTITIONER

## 2022-12-30 PROCEDURE — 1160F RVW MEDS BY RX/DR IN RCRD: CPT | Mod: CPTII,S$GLB,, | Performed by: NURSE PRACTITIONER

## 2022-12-30 PROCEDURE — 3078F PR MOST RECENT DIASTOLIC BLOOD PRESSURE < 80 MM HG: ICD-10-PCS | Mod: CPTII,S$GLB,, | Performed by: NURSE PRACTITIONER

## 2022-12-30 PROCEDURE — 3008F BODY MASS INDEX DOCD: CPT | Mod: CPTII,S$GLB,, | Performed by: NURSE PRACTITIONER

## 2022-12-30 PROCEDURE — 1159F MED LIST DOCD IN RCRD: CPT | Mod: CPTII,S$GLB,, | Performed by: NURSE PRACTITIONER

## 2022-12-30 PROCEDURE — 3074F SYST BP LT 130 MM HG: CPT | Mod: CPTII,S$GLB,, | Performed by: NURSE PRACTITIONER

## 2022-12-30 PROCEDURE — 3078F DIAST BP <80 MM HG: CPT | Mod: CPTII,S$GLB,, | Performed by: NURSE PRACTITIONER

## 2022-12-30 PROCEDURE — 99213 OFFICE O/P EST LOW 20 MIN: CPT | Mod: S$GLB,,, | Performed by: NURSE PRACTITIONER

## 2022-12-30 PROCEDURE — 3008F PR BODY MASS INDEX (BMI) DOCUMENTED: ICD-10-PCS | Mod: CPTII,S$GLB,, | Performed by: NURSE PRACTITIONER

## 2022-12-30 PROCEDURE — 3074F PR MOST RECENT SYSTOLIC BLOOD PRESSURE < 130 MM HG: ICD-10-PCS | Mod: CPTII,S$GLB,, | Performed by: NURSE PRACTITIONER

## 2022-12-30 PROCEDURE — 99213 PR OFFICE/OUTPT VISIT, EST, LEVL III, 20-29 MIN: ICD-10-PCS | Mod: S$GLB,,, | Performed by: NURSE PRACTITIONER

## 2022-12-30 PROCEDURE — 1159F PR MEDICATION LIST DOCUMENTED IN MEDICAL RECORD: ICD-10-PCS | Mod: CPTII,S$GLB,, | Performed by: NURSE PRACTITIONER

## 2022-12-30 RX ORDER — AZITHROMYCIN 250 MG/1
TABLET, FILM COATED ORAL
Qty: 6 TABLET | Refills: 0 | Status: SHIPPED | OUTPATIENT
Start: 2022-12-30 | End: 2023-01-04

## 2022-12-30 NOTE — PROGRESS NOTES
Subjective:       Patient ID: Eve Blackwood is a 32 y.o. female.    Chief Complaint: Sinus Problem    HPI New patient to me here with sinus symptoms, onset about two weeks ago. Nasal congestion and PND. Using flonase, claritin for about 10 days without improvement. She is breast feeding, has 3 week old. States mild cough from the PND. No fever. See ROS.    The following portion of the patients history was reviewed and updated as appropriate: allergies, current medications, past medical and surgical history. Past social history and problem list reviewed. Family PMH and Past social history reviewed. Tobacco, Illicit drug use reviewed.      Review of patient's allergies indicates:   Allergen Reactions    Vancomycin analogues Swelling and Rash    Ciprofloxacin Swelling    Pcn [penicillins] Other (See Comments)     unknown    Monistat 1 (tioconazole) [tioconazole] Hives         Current Outpatient Medications:     azelastine (ASTELIN) 137 mcg (0.1 %) nasal spray, 1 spray (137 mcg total) by Nasal route 2 (two) times daily., Disp: 30 mL, Rfl: 12    diphenhydrAMINE (BENADRYL) 25 mg capsule, Take 25 mg by mouth nightly as needed., Disp: , Rfl:     ibuprofen (ADVIL,MOTRIN) 800 MG tablet, Take 1 tablet (800 mg total) by mouth every 8 (eight) hours., Disp: 30 tablet, Rfl: 1    loratadine (CLARITIN) 10 mg tablet, Take 10 mg by mouth once daily., Disp: , Rfl:     nitrofurantoin, macrocrystal-monohydrate, (MACROBID) 100 MG capsule, Take 1 capsule (100 mg total) by mouth once daily., Disp: 15 capsule, Rfl: 0    oxybutynin (DITROPAN) 5 MG Tab, Take 1 tablet (5 mg total) by mouth 3 (three) times daily., Disp: 30 tablet, Rfl: 1    oxyCODONE-acetaminophen (PERCOCET) 5-325 mg per tablet, Take 1 tablet by mouth every 4 (four) hours as needed for Pain., Disp: 30 tablet, Rfl: 0    rizatriptan (MAXALT) 10 MG tablet, Take 1 tab at onset of migraine.  May repeat 1 tab in 2 hours if needed.  Limit to 3 tabs/week., Disp: 9 tablet, Rfl:  6    Past Medical History:   Diagnosis Date    Abnormal Pap smear     Androgenic alopecia     Arthritis     Celiac disease     Chronic rhinitis     Decreased fetal movement 4/10/2019    Hair loss 2017    Headache(784.0)     Herniated cervical disc     IBS (irritable bowel syndrome)     Irregular uterine contractions 2019    Neck pain     PONV (postoperative nausea and vomiting)     Preeclampsia 2019       Past Surgical History:   Procedure Laterality Date    BLADDER REPAIR       SECTION       SECTION N/A 2019    Procedure:  SECTION;  Surgeon: Cee Joaquin MD;  Location: [facility] L[facility]D;  Service: OB/GYN;  Laterality: N/A;     SECTION WITH TUBAL LIGATION N/A 2022    Procedure:  SECTION, WITH TUBAL LIGATION;  Surgeon: Cee Joaquin MD;  Location: [facility] L[facility];  Service: OB/GYN;  Laterality: N/A;    COLONOSCOPY      Dr. Tyson: normal findings per patient report    ESOPHAGOGASTRODUODENOSCOPY N/A 2020    Procedure: EGD (ESOPHAGOGASTRODUODENOSCOPY);  Surgeon: Jaya Villaseñor MD;  Location: [facility];  Service: Endoscopy;  Laterality: N/A;    EXTERNAL EAR SURGERY      for cauliflower ear ,    GALLBLADDER SURGERY      INJECTION OF ANESTHETIC AGENT AROUND MEDIAL BRANCH NERVES INNERVATING CERVICAL FACET JOINT Left 2021    Procedure: BLOCK, NERVE, FACET JOINT, CERVICAL, MEDIAL BRANCH;  Surgeon: Ruddy Smith Jr., MD;  Location: [facility];  Service: Pain Management;  Laterality: Left;  C3, 4, 5, 6    LAPAROSCOPIC CHOLECYSTECTOMY N/A 10/14/2020    Procedure: CHOLECYSTECTOMY, LAPAROSCOPIC;  Surgeon: Tad Ramirez MD;  Location: [facility] OR;  Service: General;  Laterality: N/A;    Reconstructive ankle surgery Right     TONSILLECTOMY, ADENOIDECTOMY      UPPER GASTROINTESTINAL ENDOSCOPY      Dr. Tyson: normal findings per patient report       Social History     Socioeconomic History    Marital status:    Tobacco Use    Smoking  "status: Never    Smokeless tobacco: Never   Substance and Sexual Activity    Alcohol use: Not Currently     Comment: occasional- maybe 1-2 beers a month    Drug use: No    Sexual activity: Yes     Partners: Male   Social History Narrative    Employed    engaged- wedding in 11/2018; Just around [employer]; 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     Review of Systems   Constitutional:  Negative for fatigue and fever.   HENT:  Positive for congestion, postnasal drip, rhinorrhea, sinus pressure and sore throat (irritation from the PND). Negative for voice change.    Eyes:  Negative for visual disturbance.   Respiratory:  Positive for cough (from the PND). Negative for chest tightness, shortness of breath and wheezing.    Cardiovascular:  Negative for chest pain, palpitations and leg swelling.   Gastrointestinal:  Negative for abdominal pain, diarrhea, nausea and vomiting.   Genitourinary:  Negative for difficulty urinating and dysuria.   Musculoskeletal:  Negative for arthralgias, back pain and gait problem.   Neurological:  Negative for dizziness, weakness and headaches.     Objective:      /70   Pulse (!) 126   Temp 97.5 °F (36.4 °C) (Temporal)   Resp 18   Ht 5' 7" (1.702 m)   Wt 65.4 kg (144 lb 1.1 oz)   SpO2 98%   Breastfeeding Yes   BMI 22.56 kg/m²      Physical Exam  Vitals reviewed.   Constitutional:       General: She is not in acute distress.     Appearance: Normal appearance. She is well-developed and normal weight.   HENT:      Head: Normocephalic.      Right Ear: Tympanic membrane, ear canal and external ear normal.      Left Ear: Tympanic membrane, ear canal and external ear normal.      Nose: Congestion and rhinorrhea present.      Mouth/Throat:      Mouth: Mucous membranes are moist.      Pharynx: Oropharynx is clear. No oropharyngeal exudate or posterior oropharyngeal erythema.      Comments: Thick yellow mucous to posterior pharynx  Eyes:      Conjunctiva/sclera: Conjunctivae normal. "      Pupils: Pupils are equal, round, and reactive to light.   Neck:      Thyroid: No thyromegaly.      Trachea: Trachea normal. No tracheal tenderness or tracheal deviation.   Cardiovascular:      Rate and Rhythm: Normal rate and regular rhythm.      Pulses: Normal pulses.           Carotid pulses are 2+ on the right side and 2+ on the left side.       Radial pulses are 2+ on the right side and 2+ on the left side.      Heart sounds: Normal heart sounds. No murmur heard.    No gallop.   Pulmonary:      Effort: Pulmonary effort is normal. No respiratory distress.      Breath sounds: Normal breath sounds. No stridor. No wheezing, rhonchi or rales.   Abdominal:      Palpations: There is no splenomegaly.   Musculoskeletal:         General: Normal range of motion.      Cervical back: Full passive range of motion without pain, normal range of motion and neck supple. No edema.      Right lower leg: No edema.      Left lower leg: No edema.      Comments: Gait and coordination normal.  strong, equal. Upper and lower extremity strength normal.    Skin:     General: Skin is warm and dry.   Neurological:      General: No focal deficit present.      Mental Status: She is alert and oriented to person, place, and time.      Gait: Gait normal.   Psychiatric:         Attention and Perception: Attention and perception normal.         Mood and Affect: Mood and affect normal.         Speech: Speech normal.         Behavior: Behavior normal.       Assessment:       1. Acute maxillary sinusitis, recurrence not specified        Plan:       Acute maxillary sinusitis, recurrence not specified: Due to duration and presenting exam will cover with antibiotics. Take as directed. Complete full course of medication.     Other orders  -     azithromycin (Z-CAROLINA) 250 MG tablet; Take 2 tablets by mouth on day 1; Take 1 tablet by mouth on days 2-5  Dispense: 6 tablet; Refill: 0     Continue current allergy medications  Take medications only as  prescribed  Healthy diet  Adequate rest  Adequate hydration  Avoid allergens  Avoid excessive caffeine      Follow up if not improving

## 2023-01-24 ENCOUNTER — OFFICE VISIT (OUTPATIENT)
Dept: FAMILY MEDICINE | Facility: CLINIC | Age: 33
End: 2023-01-24
Payer: COMMERCIAL

## 2023-01-24 VITALS
BODY MASS INDEX: 23.17 KG/M2 | SYSTOLIC BLOOD PRESSURE: 112 MMHG | RESPIRATION RATE: 20 BRPM | TEMPERATURE: 99 F | WEIGHT: 147.63 LBS | HEART RATE: 95 BPM | DIASTOLIC BLOOD PRESSURE: 72 MMHG | HEIGHT: 67 IN | OXYGEN SATURATION: 98 %

## 2023-01-24 DIAGNOSIS — J98.9 REACTIVE AIRWAY DISEASE WITHOUT ASTHMA: ICD-10-CM

## 2023-01-24 DIAGNOSIS — J32.9 SINUSITIS, UNSPECIFIED CHRONICITY, UNSPECIFIED LOCATION: Primary | ICD-10-CM

## 2023-01-24 PROCEDURE — 3074F PR MOST RECENT SYSTOLIC BLOOD PRESSURE < 130 MM HG: ICD-10-PCS | Mod: CPTII,S$GLB,, | Performed by: NURSE PRACTITIONER

## 2023-01-24 PROCEDURE — 3078F PR MOST RECENT DIASTOLIC BLOOD PRESSURE < 80 MM HG: ICD-10-PCS | Mod: CPTII,S$GLB,, | Performed by: NURSE PRACTITIONER

## 2023-01-24 PROCEDURE — 99214 PR OFFICE/OUTPT VISIT, EST, LEVL IV, 30-39 MIN: ICD-10-PCS | Mod: S$GLB,,, | Performed by: NURSE PRACTITIONER

## 2023-01-24 PROCEDURE — 3074F SYST BP LT 130 MM HG: CPT | Mod: CPTII,S$GLB,, | Performed by: NURSE PRACTITIONER

## 2023-01-24 PROCEDURE — 1159F MED LIST DOCD IN RCRD: CPT | Mod: CPTII,S$GLB,, | Performed by: NURSE PRACTITIONER

## 2023-01-24 PROCEDURE — 3078F DIAST BP <80 MM HG: CPT | Mod: CPTII,S$GLB,, | Performed by: NURSE PRACTITIONER

## 2023-01-24 PROCEDURE — 99214 OFFICE O/P EST MOD 30 MIN: CPT | Mod: S$GLB,,, | Performed by: NURSE PRACTITIONER

## 2023-01-24 PROCEDURE — 3008F PR BODY MASS INDEX (BMI) DOCUMENTED: ICD-10-PCS | Mod: CPTII,S$GLB,, | Performed by: NURSE PRACTITIONER

## 2023-01-24 PROCEDURE — 1159F PR MEDICATION LIST DOCUMENTED IN MEDICAL RECORD: ICD-10-PCS | Mod: CPTII,S$GLB,, | Performed by: NURSE PRACTITIONER

## 2023-01-24 PROCEDURE — 3008F BODY MASS INDEX DOCD: CPT | Mod: CPTII,S$GLB,, | Performed by: NURSE PRACTITIONER

## 2023-01-24 RX ORDER — GUAIFENESIN 600 MG/1
1200 TABLET, EXTENDED RELEASE ORAL 2 TIMES DAILY
COMMUNITY

## 2023-01-24 RX ORDER — DOXYCYCLINE 100 MG/1
100 CAPSULE ORAL 2 TIMES DAILY
Qty: 14 CAPSULE | Refills: 0 | Status: SHIPPED | OUTPATIENT
Start: 2023-01-24 | End: 2023-01-31

## 2023-01-24 RX ORDER — PREDNISONE 20 MG/1
20 TABLET ORAL DAILY
Qty: 7 TABLET | Refills: 0 | Status: SHIPPED | OUTPATIENT
Start: 2023-01-24 | End: 2023-01-31

## 2023-01-24 NOTE — PROGRESS NOTES
"Subjective:       Patient ID: Eve Blackwood is a 32 y.o. female.    Chief Complaint: Nasal Congestion and Otalgia    Otalgia   Associated symptoms include coughing, headaches, rhinorrhea and a sore throat. Pertinent negatives include no abdominal pain, diarrhea or vomiting.   URI   This is a new (entire family with RSV) problem. The current episode started 1 to 4 weeks ago. Associated symptoms include congestion, coughing, ear pain, headaches, a plugged ear sensation, rhinorrhea, sinus pain, sneezing, a sore throat, swollen glands and wheezing. Pertinent negatives include no abdominal pain, chest pain, diarrhea, dysuria, nausea or vomiting. She has tried increased fluids (mucinex) for the symptoms.   Review of Systems   Constitutional:  Positive for appetite change and fatigue.   HENT:  Positive for congestion, ear pain, postnasal drip, rhinorrhea, sinus pressure, sinus pain, sneezing and sore throat.    Eyes:  Negative for pain and redness.   Respiratory:  Positive for cough and wheezing. Negative for choking, chest tightness and shortness of breath.    Cardiovascular:  Negative for chest pain and palpitations.   Gastrointestinal:  Negative for abdominal distention, abdominal pain, constipation, diarrhea, nausea and vomiting.   Endocrine: Negative for polydipsia and polyphagia.   Genitourinary:  Negative for dysuria and hematuria.   Musculoskeletal:  Negative for arthralgias, joint swelling and myalgias.   Skin:  Negative for color change and pallor.   Neurological:  Positive for headaches. Negative for dizziness and light-headedness.     Past medical, surgical, family and social history reviewed.  Objective:     Vitals:    01/24/23 0951   BP: 112/72   Pulse: 95   Resp: 20   Temp: 99 °F (37.2 °C)   SpO2: 98%   Weight: 67 kg (147 lb 9.6 oz)   Height: 5' 7" (1.702 m)   PainSc:   4   PainLoc: Ear     Body mass index is 23.12 kg/m².     Physical Exam  Constitutional:       Appearance: She is well-developed. "   HENT:      Head: Normocephalic and atraumatic.      Right Ear: Hearing, tympanic membrane, ear canal and external ear normal.      Left Ear: Hearing, tympanic membrane, ear canal and external ear normal.      Nose: Mucosal edema and rhinorrhea present. No laceration.      Right Sinus: Maxillary sinus tenderness and frontal sinus tenderness present.      Left Sinus: Maxillary sinus tenderness and frontal sinus tenderness present.      Mouth/Throat:      Pharynx: Uvula midline. Posterior oropharyngeal erythema present.   Eyes:      General: No scleral icterus.        Right eye: No discharge.         Left eye: No discharge.      Conjunctiva/sclera: Conjunctivae normal.   Neck:      Trachea: No tracheal deviation.   Cardiovascular:      Rate and Rhythm: Normal rate and regular rhythm.      Heart sounds: No murmur heard.  Pulmonary:      Effort: Pulmonary effort is normal. No respiratory distress.      Breath sounds: No stridor. Wheezing present. No rales.   Chest:      Chest wall: No tenderness.   Musculoskeletal:         General: Normal range of motion.      Cervical back: Normal range of motion and neck supple.   Lymphadenopathy:      Cervical: Cervical adenopathy present.   Skin:     General: Skin is warm and dry.   Neurological:      Mental Status: She is alert and oriented to person, place, and time.   Psychiatric:         Behavior: Behavior normal.         Thought Content: Thought content normal.         Judgment: Judgment normal.       Assessment:       1. Sinusitis, unspecified chronicity, unspecified location    2. Reactive airway disease without asthma        Plan:       Eve was seen today for nasal congestion and otalgia.    Diagnoses and all orders for this visit:    Sinusitis, unspecified chronicity, unspecified location  -     doxycycline (VIBRAMYCIN) 100 MG Cap; Take 1 capsule (100 mg total) by mouth 2 (two) times daily. for 7 days    Reactive airway disease without asthma  -     predniSONE  (DELTASONE) 20 MG tablet; Take 1 tablet (20 mg total) by mouth once daily. for 7 days    I spent 30 minutes on this encounter, time includes face-to-face, chart review, documentation, test review and orders.

## 2023-05-22 ENCOUNTER — CLINICAL SUPPORT (OUTPATIENT)
Dept: AUDIOLOGY | Facility: CLINIC | Age: 33
End: 2023-05-22
Payer: COMMERCIAL

## 2023-05-22 ENCOUNTER — OFFICE VISIT (OUTPATIENT)
Dept: OTOLARYNGOLOGY | Facility: CLINIC | Age: 33
End: 2023-05-22
Payer: COMMERCIAL

## 2023-05-22 VITALS — WEIGHT: 147.69 LBS | HEIGHT: 67 IN | BODY MASS INDEX: 23.18 KG/M2 | TEMPERATURE: 99 F

## 2023-05-22 DIAGNOSIS — H90.3 BILATERAL SENSORINEURAL HEARING LOSS: Primary | ICD-10-CM

## 2023-05-22 DIAGNOSIS — H93.13 TINNITUS, BILATERAL: ICD-10-CM

## 2023-05-22 DIAGNOSIS — H91.90 HEARING DIFFICULTY, UNSPECIFIED LATERALITY: Primary | ICD-10-CM

## 2023-05-22 DIAGNOSIS — H91.90 HEARING DIFFICULTY, UNSPECIFIED LATERALITY: ICD-10-CM

## 2023-05-22 DIAGNOSIS — H93.13 BILATERAL TINNITUS: ICD-10-CM

## 2023-05-22 PROCEDURE — 99999 PR PBB SHADOW E&M-EST. PATIENT-LVL II: ICD-10-PCS | Mod: PBBFAC,,, | Performed by: AUDIOLOGIST

## 2023-05-22 PROCEDURE — 99999 PR PBB SHADOW E&M-EST. PATIENT-LVL III: CPT | Mod: PBBFAC,,, | Performed by: NURSE PRACTITIONER

## 2023-05-22 PROCEDURE — 92557 PR COMPREHENSIVE HEARING TEST: ICD-10-PCS | Mod: S$GLB,,, | Performed by: AUDIOLOGIST

## 2023-05-22 PROCEDURE — 92567 TYMPANOMETRY: CPT | Mod: S$GLB,,, | Performed by: AUDIOLOGIST

## 2023-05-22 PROCEDURE — 92557 COMPREHENSIVE HEARING TEST: CPT | Mod: S$GLB,,, | Performed by: AUDIOLOGIST

## 2023-05-22 PROCEDURE — 3008F PR BODY MASS INDEX (BMI) DOCUMENTED: ICD-10-PCS | Mod: CPTII,S$GLB,, | Performed by: NURSE PRACTITIONER

## 2023-05-22 PROCEDURE — 3008F BODY MASS INDEX DOCD: CPT | Mod: CPTII,S$GLB,, | Performed by: NURSE PRACTITIONER

## 2023-05-22 PROCEDURE — 1160F PR REVIEW ALL MEDS BY PRESCRIBER/CLIN PHARMACIST DOCUMENTED: ICD-10-PCS | Mod: CPTII,S$GLB,, | Performed by: NURSE PRACTITIONER

## 2023-05-22 PROCEDURE — 1159F MED LIST DOCD IN RCRD: CPT | Mod: CPTII,S$GLB,, | Performed by: NURSE PRACTITIONER

## 2023-05-22 PROCEDURE — 99999 PR PBB SHADOW E&M-EST. PATIENT-LVL III: ICD-10-PCS | Mod: PBBFAC,,, | Performed by: NURSE PRACTITIONER

## 2023-05-22 PROCEDURE — 99214 PR OFFICE/OUTPT VISIT, EST, LEVL IV, 30-39 MIN: ICD-10-PCS | Mod: S$GLB,,, | Performed by: NURSE PRACTITIONER

## 2023-05-22 PROCEDURE — 99214 OFFICE O/P EST MOD 30 MIN: CPT | Mod: S$GLB,,, | Performed by: NURSE PRACTITIONER

## 2023-05-22 PROCEDURE — 1160F RVW MEDS BY RX/DR IN RCRD: CPT | Mod: CPTII,S$GLB,, | Performed by: NURSE PRACTITIONER

## 2023-05-22 PROCEDURE — 1159F PR MEDICATION LIST DOCUMENTED IN MEDICAL RECORD: ICD-10-PCS | Mod: CPTII,S$GLB,, | Performed by: NURSE PRACTITIONER

## 2023-05-22 PROCEDURE — 99999 PR PBB SHADOW E&M-EST. PATIENT-LVL II: CPT | Mod: PBBFAC,,, | Performed by: AUDIOLOGIST

## 2023-05-22 PROCEDURE — 92567 PR TYMPA2METRY: ICD-10-PCS | Mod: S$GLB,,, | Performed by: AUDIOLOGIST

## 2023-05-22 NOTE — PROGRESS NOTES
Subjective     Patient ID: Eve Blackwood is a 33 y.o. female.    Chief Complaint: Hearing difficulty    HPI  Patient saw me <2 years ago for nasal congestion treated with Flonase & Astelin. Patient reports progressive worsening of her hearing over the past 2 years particularly if background noise is present.  She has difficulty understanding what is said.  If she is driving, she is unable to hear her son in the back seat.  She must ask for repetition often.  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  Her father were hearing aids.  She denies otologic history.  She has an occasional brief tinnitus which is self-limited.    Review of Systems   Constitutional: Negative.    HENT:  Positive for hearing loss and tinnitus.    Eyes: Negative.    Respiratory: Negative.     Cardiovascular: Negative.    Gastrointestinal: Negative.    Musculoskeletal: Negative.    Integumentary:  Negative.   Neurological: Negative.    Hematological: Negative.    Psychiatric/Behavioral: Negative.          Objective     Physical Exam  Vitals and nursing note reviewed.   Constitutional:       General: She is not in acute distress.     Appearance: She is well-developed. She is not ill-appearing.   HENT:      Head: Normocephalic and atraumatic.      Right Ear: Hearing, tympanic membrane, ear canal and external ear normal. No middle ear effusion. Tympanic membrane is not erythematous.      Left Ear: Hearing, tympanic membrane, ear canal and external ear normal.  No middle ear effusion. Tympanic membrane is not erythematous.      Nose: Nose normal.   Eyes:      General: Lids are normal. No scleral icterus.        Right eye: No discharge.         Left eye: No discharge.   Neck:      Trachea: Trachea normal. No tracheal deviation.   Cardiovascular:      Rate and Rhythm: Normal rate.   Pulmonary:      Effort: Pulmonary effort is normal. No respiratory distress.      Breath sounds: No stridor. No wheezing.    Musculoskeletal:         General: Normal range of motion.      Cervical back: Normal range of motion and neck supple.   Skin:     General: Skin is warm and dry.   Neurological:      Mental Status: She is alert and oriented to person, place, and time.      Coordination: Coordination normal.      Gait: Gait normal.   Psychiatric:         Attention and Perception: Attention normal.         Mood and Affect: Mood normal.         Speech: Speech normal.         Behavior: Behavior normal. Behavior is cooperative.            Assessment and Plan     Problem List Items Addressed This Visit    None  Visit Diagnoses       Bilateral sensorineural hearing loss    -  Primary        PATIENT IS MEDICALLY CLEARED FOR HEARING AIDS. The patient's hearing loss is not due to temporarily, correctable physical condition. There are no contraindications to hearing aid candidacy. Patient's audiogram reveals the patient is a candidate for amplification. Audiogram is reviewed in detail with the patient. The audiologist's recommendation that the patient have amplification/hearing aids is discussed and questions answered. Patient has been given information by the audiologist on how to schedule a hearing aid consultation. Patient is encouraged to wear ear protection in loud noise and return annually for hearing test. Return to clinic as needed for further ENT concerns.

## 2023-05-22 NOTE — PROGRESS NOTES
Eve Blackwood was seen 05/22/2023 for an audiological evaluation.      Pt reported difficulty hearing speech clearly for a few years.  Occasional bilateral tinnitus.  She reported that her father has hearing loss and hearing aids.      Otoscopy revealed clear view of both external ear canals and tympanic membranes.  No obstructive cerumen present in either ear canal.       Audiogram results revealed a mild sensorineural hearing loss for the right ear and a mild sensorineural hearing loss for the left ear.  Speech Reception Thresholds were 25 dBHL for the right ear and 30 dBHL for the left ear.  Word recognition scores were good for the right ear and good for the left ear.   Tympanograms were Type A for the right ear and Type A for the left ear.    Audiogram results were reviewed in detail with patient and all questions were answered. Results will be reviewed by ENT and/or referring provider at the completion of this note.     Recommend binaural amplification pending medical clearance, hearing protection for all loud sounds and annual audiogram to monitor hearing loss.

## 2023-05-24 ENCOUNTER — OFFICE VISIT (OUTPATIENT)
Dept: FAMILY MEDICINE | Facility: CLINIC | Age: 33
End: 2023-05-24
Payer: COMMERCIAL

## 2023-05-24 VITALS
SYSTOLIC BLOOD PRESSURE: 110 MMHG | RESPIRATION RATE: 18 BRPM | DIASTOLIC BLOOD PRESSURE: 72 MMHG | OXYGEN SATURATION: 99 % | BODY MASS INDEX: 23.72 KG/M2 | WEIGHT: 151.13 LBS | TEMPERATURE: 98 F | HEIGHT: 67 IN | HEART RATE: 84 BPM

## 2023-05-24 DIAGNOSIS — H60.02 ABSCESS OF LEFT EXTERNAL EAR: Primary | ICD-10-CM

## 2023-05-24 PROCEDURE — 3008F PR BODY MASS INDEX (BMI) DOCUMENTED: ICD-10-PCS | Mod: CPTII,S$GLB,, | Performed by: NURSE PRACTITIONER

## 2023-05-24 PROCEDURE — 99213 OFFICE O/P EST LOW 20 MIN: CPT | Mod: S$GLB,,, | Performed by: NURSE PRACTITIONER

## 2023-05-24 PROCEDURE — 3074F SYST BP LT 130 MM HG: CPT | Mod: CPTII,S$GLB,, | Performed by: NURSE PRACTITIONER

## 2023-05-24 PROCEDURE — 3078F DIAST BP <80 MM HG: CPT | Mod: CPTII,S$GLB,, | Performed by: NURSE PRACTITIONER

## 2023-05-24 PROCEDURE — 1159F MED LIST DOCD IN RCRD: CPT | Mod: CPTII,S$GLB,, | Performed by: NURSE PRACTITIONER

## 2023-05-24 PROCEDURE — 3074F PR MOST RECENT SYSTOLIC BLOOD PRESSURE < 130 MM HG: ICD-10-PCS | Mod: CPTII,S$GLB,, | Performed by: NURSE PRACTITIONER

## 2023-05-24 PROCEDURE — 3078F PR MOST RECENT DIASTOLIC BLOOD PRESSURE < 80 MM HG: ICD-10-PCS | Mod: CPTII,S$GLB,, | Performed by: NURSE PRACTITIONER

## 2023-05-24 PROCEDURE — 99213 PR OFFICE/OUTPT VISIT, EST, LEVL III, 20-29 MIN: ICD-10-PCS | Mod: S$GLB,,, | Performed by: NURSE PRACTITIONER

## 2023-05-24 PROCEDURE — 3008F BODY MASS INDEX DOCD: CPT | Mod: CPTII,S$GLB,, | Performed by: NURSE PRACTITIONER

## 2023-05-24 PROCEDURE — 1159F PR MEDICATION LIST DOCUMENTED IN MEDICAL RECORD: ICD-10-PCS | Mod: CPTII,S$GLB,, | Performed by: NURSE PRACTITIONER

## 2023-05-24 RX ORDER — CLINDAMYCIN HYDROCHLORIDE 300 MG/1
300 CAPSULE ORAL 3 TIMES DAILY
Qty: 15 CAPSULE | Refills: 0 | Status: SHIPPED | OUTPATIENT
Start: 2023-05-24 | End: 2023-08-10 | Stop reason: ALTCHOICE

## 2023-05-24 NOTE — PROGRESS NOTES
Subjective:       Patient ID: Eve Blackwood is a 33 y.o. female.    Chief Complaint: painful lump on left ear /tragus (X- Friday ) and Jaw Pain    HPI last week noticed some swelling to the front of left ear, felt a Lump. States yesterday it opened up and drained. Still a little tender but much better than it was. Applying bactroban. No fever. No ear pain. See ROS    The following portion of the patients history was reviewed and updated as appropriate: allergies, current medications, past medical and surgical history. Past social history and problem list reviewed. Family PMH and Past social history reviewed. Tobacco, Illicit drug use reviewed.      Review of patient's allergies indicates:   Allergen Reactions    Vancomycin analogues Swelling and Rash    Ciprofloxacin Swelling    Pcn [penicillins] Other (See Comments)     unknown    Monistat 1 (tioconazole) [tioconazole] Hives         Current Outpatient Medications:     azelastine (ASTELIN) 137 mcg (0.1 %) nasal spray, 1 spray (137 mcg total) by Nasal route 2 (two) times daily., Disp: 30 mL, Rfl: 12    diphenhydrAMINE (BENADRYL) 25 mg capsule, Take 25 mg by mouth nightly as needed., Disp: , Rfl:     guaiFENesin (MUCINEX) 600 mg 12 hr tablet, Take 1,200 mg by mouth 2 (two) times daily., Disp: , Rfl:     ibuprofen (ADVIL,MOTRIN) 800 MG tablet, Take 1 tablet (800 mg total) by mouth every 8 (eight) hours. (Patient not taking: Reported on 1/24/2023), Disp: 30 tablet, Rfl: 1    loratadine (CLARITIN) 10 mg tablet, Take 10 mg by mouth once daily., Disp: , Rfl:     Past Medical History:   Diagnosis Date    Abnormal Pap smear     Androgenic alopecia     Arthritis     Celiac disease     Chronic rhinitis     Decreased fetal movement 4/10/2019    Hair loss 1/30/2017    Headache(784.0)     Herniated cervical disc     IBS (irritable bowel syndrome)     Irregular uterine contractions 5/7/2019    Neck pain     PONV (postoperative nausea and vomiting)     Preeclampsia  2019       Past Surgical History:   Procedure Laterality Date    BLADDER REPAIR       SECTION       SECTION N/A 2019    Procedure:  SECTION;  Surgeon: Cee Joaquin MD;  Location: [facility] L&D;  Service: OB/GYN;  Laterality: N/A;     SECTION WITH TUBAL LIGATION N/A 2022    Procedure:  SECTION, WITH TUBAL LIGATION;  Surgeon: Cee Joaquin MD;  Location: [facility] L&D;  Service: OB/GYN;  Laterality: N/A;    COLONOSCOPY      Dr. Tyosn: normal findings per patient report    ESOPHAGOGASTRODUODENOSCOPY N/A 2020    Procedure: EGD (ESOPHAGOGASTRODUODENOSCOPY);  Surgeon: Jaya Villaseñor MD;  Location: [facility];  Service: Endoscopy;  Laterality: N/A;    EXTERNAL EAR SURGERY      for cauliflower ear ,    GALLBLADDER SURGERY      INJECTION OF ANESTHETIC AGENT AROUND MEDIAL BRANCH NERVES INNERVATING CERVICAL FACET JOINT Left 2021    Procedure: BLOCK, NERVE, FACET JOINT, CERVICAL, MEDIAL BRANCH;  Surgeon: Ruddy Smith Jr., MD;  Location: [facility];  Service: Pain Management;  Laterality: Left;  C3, 4, 5, 6    LAPAROSCOPIC CHOLECYSTECTOMY N/A 10/14/2020    Procedure: CHOLECYSTECTOMY, LAPAROSCOPIC;  Surgeon: Tad Ramirez MD;  Location: [facility] OR;  Service: General;  Laterality: N/A;    Reconstructive ankle surgery Right     TONSILLECTOMY, ADENOIDECTOMY      UPPER GASTROINTESTINAL ENDOSCOPY      Dr. Tyson: normal findings per patient report       Social History     Socioeconomic History    Marital status:    Tobacco Use    Smoking status: Never     Passive exposure: Never    Smokeless tobacco: Never   Substance and Sexual Activity    Alcohol use: Not Currently     Comment: occasional- maybe 1-2 beers a month    Drug use: No    Sexual activity: Yes     Partners: Male   Social History Narrative    Employed    engaged- wedding in 2018; [employer]; 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     Review of Systems  "  Constitutional:  Negative for fatigue and fever.   HENT:          See HPI   Respiratory:  Negative for cough, chest tightness, shortness of breath and wheezing.    Cardiovascular:  Negative for chest pain and palpitations.   Gastrointestinal:  Negative for abdominal pain, diarrhea, nausea and vomiting.   Musculoskeletal:  Negative for arthralgias.   Neurological:  Negative for headaches.   Psychiatric/Behavioral:  Negative for dysphoric mood and sleep disturbance. The patient is not nervous/anxious.      Objective:      /72 (BP Location: Left arm, Patient Position: Sitting)   Pulse 84   Temp 98.2 °F (36.8 °C)   Resp 18   Ht 5' 7" (1.702 m)   Wt 68.6 kg (151 lb 2 oz)   SpO2 99%   BMI 23.67 kg/m²      Physical Exam  Constitutional:       Appearance: Normal appearance. She is normal weight.   HENT:      Head: Normocephalic.      Right Ear: Tympanic membrane, ear canal and external ear normal.      Left Ear: Tympanic membrane and ear canal normal.      Ears:     Neck:      Thyroid: No thyromegaly.      Vascular: No carotid bruit.   Cardiovascular:      Rate and Rhythm: Normal rate and regular rhythm.      Pulses: Normal pulses.      Heart sounds: Normal heart sounds.   Pulmonary:      Effort: Pulmonary effort is normal.      Breath sounds: Normal breath sounds. No wheezing.   Musculoskeletal:      Cervical back: Normal range of motion.      Comments: Gait normal   Lymphadenopathy:      Head:      Right side of head: No submandibular adenopathy.      Left side of head: Submandibular adenopathy present.   Skin:     General: Skin is warm and dry.      Capillary Refill: Capillary refill takes less than 2 seconds.   Neurological:      General: No focal deficit present.      Mental Status: She is alert.   Psychiatric:         Attention and Perception: Attention and perception normal.         Mood and Affect: Mood and affect normal.         Speech: Speech normal.         Behavior: Behavior normal.     "   Assessment:       1. Abscess of left external ear        Plan:       Abscess of left external ear: keep clean, dry. Use dial antibacterial soap. Antibiotics as prescribed. Bactroban ointment BID    Other orders  -     clindamycin (CLEOCIN) 300 MG capsule; Take 1 capsule (300 mg total) by mouth 3 (three) times daily.  Dispense: 15 capsule; Refill: 0       Continue current medication  Take medications only as prescribed  Healthy diet, exercise  Adequate rest  Adequate hydration  Avoid allergens  Avoid excessive caffeine      Follow up if not improving

## 2023-07-24 NOTE — PROGRESS NOTES
"  Physical Therapy Daily Treatment Note     Name: Eve Ca [facility]  Clinic Number: [id number]    Therapy Diagnosis:   Encounter Diagnosis   Name Primary?    Worsening functional endurance      Physician: Kishore Palmer MD    Visit Date: 10/3/2019  Physician Orders: PT Eval and Treat   Medical Diagnosis from Referral: M25.561,M25.562 (ICD-10-CM) - Acute pain of both knees  Evaluation Date: 9/24/2019  Authorization Period Expiration: 12/31/2019  Plan of Care Expiration: 11/08/2019  Visit # / Visits authorized: 2/ 20     Time In: 1400  Time Out: 1500  Total Billable Time: 30 minutes    Precautions: Standard    Subjective     Pt reports: L med knee pain today.  She was compliant with home exercise program.  Response to previous treatment: no adverse effect  Functional change: too early    Pain: 2/10  Location: left knee      Objective     Eve received therapeutic exercises to develop strength and flexibility for 40 minutes including:  Endurance:  Upright bike: 8'    Flexibility:  HS/ITB 3 way stretch with strap 2x30"    Strength:  SLR 3x10 B  LTR 3x10 B  DKTC 3x10    Home Exercises Provided and Patient Education Provided     Education provided:   - cont HEP    Written Home Exercises Provided: Patient instructed to cont prior HEP.  Exercises were reviewed and Eve was able to demonstrate them prior to the end of the session.  Eve demonstrated good  understanding of the education provided.     See EMR under Patient Instructions for exercises provided 09/24/2019.    Assessment     Eve tolerated first tx session well. The session was focused on flexibility followed by strengthening of the lower chain. She reported no post tx pain.  Eve is progressing well towards her goals.   Pt prognosis is Excellent.     Pt will continue to benefit from skilled outpatient physical therapy to address the deficits listed in the problem list box on initial evaluation, provide pt/family education and to maximize pt's " level of independence in the home and community environment.     Pt's spiritual, cultural and educational needs considered and pt agreeable to plan of care and goals.    Anticipated barriers to physical therapy: chronic condition    Goals:   Short Term Goals: 3 weeks   -Pt will demo improving LE strength to grossly 4/5 or better for decreased pain.  -Pt will wear proper shoe wear or use insert for med arch support for optimal LE alignment and to dec pain.     Long Term Goals: 6 weeks   -Neg R Montrell's test for tight ITB to assist with decreasing knee pain.  -Improve FOTO impairment score to 33% for improved QOL.    Plan     Cont core and LE strengthening to improve myofascial balance.    Barb Cuevas, PT   Overnight events: frequent BM      POD#6 ileostomy reversal     SUBJECTIVE: Patient seen at bedside with chief resident. Patient denies nausea, or vomiting. He notes tenderness around the ostomy site.       MEDICATIONS  (STANDING):  acetaminophen     Tablet .. 650 milliGRAM(s) Oral every 6 hours  dextrose 5% + sodium chloride 0.45%. 1000 milliLiter(s) (120 mL/Hr) IV Continuous <Continuous>  fenofibrate Tablet 145 milliGRAM(s) Oral daily  heparin   Injectable 5000 Unit(s) SubCutaneous every 8 hours  lidocaine   4% Patch 1 Patch Transdermal daily  pantoprazole    Tablet 40 milliGRAM(s) Oral before breakfast  potassium phosphate / sodium phosphate Powder (PHOS-NaK) 2 Packet(s) Oral once    MEDICATIONS  (PRN):  ondansetron Injectable 4 milliGRAM(s) IV Push every 6 hours PRN Nausea and/or Vomiting  oxyCODONE    IR 2.5 milliGRAM(s) Oral every 4 hours PRN Moderate Pain (4 - 6)  oxyCODONE    IR 5 milliGRAM(s) Oral every 4 hours PRN Severe Pain (7 - 10)      Vital Signs Last 24 Hrs  T(C): 37.1 (24 Jul 2023 04:46), Max: 37.5 (23 Jul 2023 09:40)  T(F): 98.8 (24 Jul 2023 04:46), Max: 99.5 (23 Jul 2023 09:40)  HR: 70 (24 Jul 2023 04:46) (70 - 93)  BP: 127/75 (24 Jul 2023 04:46) (122/88 - 141/100)  BP(mean): 114 (23 Jul 2023 17:00) (114 - 114)  RR: 18 (24 Jul 2023 04:46) (17 - 19)  SpO2: 93% (24 Jul 2023 04:46) (93% - 95%)    Parameters below as of 24 Jul 2023 04:46  Patient On (Oxygen Delivery Method): room air        Physical Exam:  General: NAD, resting comfortably in bed  Pulmonary: Nonlabored breathing, no respiratory distress  Cardiovascular: NSR  Abdominal: soft, Appropriate incisional tenderness, gas and BM in ostomy bag,   Extremities: WWP, normal strength  Neuro: A/O x 3, CNs II-XII grossly intact, no focal deficits, normal motor/sensation  Pulses: palpable distal pulses    I&O's Summary    23 Jul 2023 07:01  -  24 Jul 2023 07:00  --------------------------------------------------------  IN: 2990 mL / OUT: 850 mL / NET: 2140 mL        LABS:                        8.8    5.28  )-----------( 422      ( 24 Jul 2023 07:11 )             28.5     07-24    137  |  104  |  7   ----------------------------<  109<H>  3.6   |  22  |  0.82    Ca    8.4      24 Jul 2023 07:11  Phos  2.3     07-24  Mg     2.2     07-24        Urinalysis Basic - ( 24 Jul 2023 07:11 )    Color: x / Appearance: x / SG: x / pH: x  Gluc: 109 mg/dL / Ketone: x  / Bili: x / Urobili: x   Blood: x / Protein: x / Nitrite: x   Leuk Esterase: x / RBC: x / WBC x   Sq Epi: x / Non Sq Epi: x / Bacteria: x      CAPILLARY BLOOD GLUCOSE            RADIOLOGY & ADDITIONAL STUDIES:

## 2023-08-10 ENCOUNTER — OFFICE VISIT (OUTPATIENT)
Dept: FAMILY MEDICINE | Facility: CLINIC | Age: 33
End: 2023-08-10
Payer: COMMERCIAL

## 2023-08-10 VITALS
TEMPERATURE: 98 F | BODY MASS INDEX: 23.53 KG/M2 | WEIGHT: 149.94 LBS | HEART RATE: 104 BPM | SYSTOLIC BLOOD PRESSURE: 110 MMHG | DIASTOLIC BLOOD PRESSURE: 80 MMHG | OXYGEN SATURATION: 99 % | HEIGHT: 67 IN

## 2023-08-10 DIAGNOSIS — T14.8XXA INFECTED ABRASION: Primary | ICD-10-CM

## 2023-08-10 DIAGNOSIS — L08.9 INFECTED ABRASION: Primary | ICD-10-CM

## 2023-08-10 PROCEDURE — 1160F PR REVIEW ALL MEDS BY PRESCRIBER/CLIN PHARMACIST DOCUMENTED: ICD-10-PCS | Mod: CPTII,S$GLB,, | Performed by: NURSE PRACTITIONER

## 2023-08-10 PROCEDURE — 3008F PR BODY MASS INDEX (BMI) DOCUMENTED: ICD-10-PCS | Mod: CPTII,S$GLB,, | Performed by: NURSE PRACTITIONER

## 2023-08-10 PROCEDURE — 1160F RVW MEDS BY RX/DR IN RCRD: CPT | Mod: CPTII,S$GLB,, | Performed by: NURSE PRACTITIONER

## 2023-08-10 PROCEDURE — 99213 PR OFFICE/OUTPT VISIT, EST, LEVL III, 20-29 MIN: ICD-10-PCS | Mod: S$GLB,,, | Performed by: NURSE PRACTITIONER

## 2023-08-10 PROCEDURE — 1159F PR MEDICATION LIST DOCUMENTED IN MEDICAL RECORD: ICD-10-PCS | Mod: CPTII,S$GLB,, | Performed by: NURSE PRACTITIONER

## 2023-08-10 PROCEDURE — 3079F DIAST BP 80-89 MM HG: CPT | Mod: CPTII,S$GLB,, | Performed by: NURSE PRACTITIONER

## 2023-08-10 PROCEDURE — 99213 OFFICE O/P EST LOW 20 MIN: CPT | Mod: S$GLB,,, | Performed by: NURSE PRACTITIONER

## 2023-08-10 PROCEDURE — 3079F PR MOST RECENT DIASTOLIC BLOOD PRESSURE 80-89 MM HG: ICD-10-PCS | Mod: CPTII,S$GLB,, | Performed by: NURSE PRACTITIONER

## 2023-08-10 PROCEDURE — 3008F BODY MASS INDEX DOCD: CPT | Mod: CPTII,S$GLB,, | Performed by: NURSE PRACTITIONER

## 2023-08-10 PROCEDURE — 1159F MED LIST DOCD IN RCRD: CPT | Mod: CPTII,S$GLB,, | Performed by: NURSE PRACTITIONER

## 2023-08-10 PROCEDURE — 3074F PR MOST RECENT SYSTOLIC BLOOD PRESSURE < 130 MM HG: ICD-10-PCS | Mod: CPTII,S$GLB,, | Performed by: NURSE PRACTITIONER

## 2023-08-10 PROCEDURE — 3074F SYST BP LT 130 MM HG: CPT | Mod: CPTII,S$GLB,, | Performed by: NURSE PRACTITIONER

## 2023-08-10 RX ORDER — CLINDAMYCIN HYDROCHLORIDE 300 MG/1
300 CAPSULE ORAL 3 TIMES DAILY
Qty: 21 CAPSULE | Refills: 0 | Status: SHIPPED | OUTPATIENT
Start: 2023-08-10 | End: 2024-03-14

## 2023-08-10 NOTE — PROGRESS NOTES
Subjective:       Patient ID: Eve Blackwood is a 33 y.o. female.    Chief Complaint: infected rash    Rash  Pertinent negatives include no cough, diarrhea, fatigue, fever, shortness of breath or vomiting.     States reyes has been going around the household. She noticed tender area, rash to left buttock earlier this week. Has been applying bactroban ointment without improvement. Some mild drainage. Getting more tender. Has been keeping it clean. No other areas of concern. See ROS    The following portion of the patients history was reviewed and updated as appropriate: allergies, current medications, past medical and surgical history. Past social history and problem list reviewed. Family PMH and Past social history reviewed. Tobacco, Illicit drug use reviewed.      Review of patient's allergies indicates:   Allergen Reactions    Vancomycin analogues Swelling and Rash    Ciprofloxacin Swelling    Pcn [penicillins] Other (See Comments)     unknown    Monistat 1 (tioconazole) [tioconazole] Hives         Current Outpatient Medications:     diphenhydrAMINE (BENADRYL) 25 mg capsule, Take 25 mg by mouth nightly as needed., Disp: , Rfl:     loratadine (CLARITIN) 10 mg tablet, Take 10 mg by mouth once daily., Disp: , Rfl:     azelastine (ASTELIN) 137 mcg (0.1 %) nasal spray, 1 spray (137 mcg total) by Nasal route 2 (two) times daily. (Patient not taking: Reported on 5/24/2023), Disp: 30 mL, Rfl: 12    ibuprofen (ADVIL,MOTRIN) 800 MG tablet, Take 1 tablet (800 mg total) by mouth every 8 (eight) hours. (Patient not taking: Reported on 8/10/2023), Disp: 30 tablet, Rfl: 1    Past Medical History:   Diagnosis Date    Abnormal Pap smear     Androgenic alopecia     Arthritis     Celiac disease     Chronic rhinitis     Decreased fetal movement 4/10/2019    Hair loss 1/30/2017    Headache(784.0)     Herniated cervical disc     IBS (irritable bowel syndrome)     Irregular uterine contractions 5/7/2019    Neck pain     PONV  (postoperative nausea and vomiting)     Preeclampsia 2019       Past Surgical History:   Procedure Laterality Date    BLADDER REPAIR       SECTION       SECTION N/A 2019    Procedure:  SECTION;  Surgeon: Cee Joaquin MD;  Location: [facility] L&D;  Service: OB/GYN;  Laterality: N/A;     SECTION WITH TUBAL LIGATION N/A 2022    Procedure:  SECTION, WITH TUBAL LIGATION;  Surgeon: Cee Joaquin MD;  Location: [facility] L&D;  Service: OB/GYN;  Laterality: N/A;    COLONOSCOPY      Dr. Tyson: normal findings per patient report    ESOPHAGOGASTRODUODENOSCOPY N/A 2020    Procedure: EGD (ESOPHAGOGASTRODUODENOSCOPY);  Surgeon: Jaya Villaseñor MD;  Location: [facility];  Service: Endoscopy;  Laterality: N/A;    EXTERNAL EAR SURGERY      for cauliflower ear ,    GALLBLADDER SURGERY      INJECTION OF ANESTHETIC AGENT AROUND MEDIAL BRANCH NERVES INNERVATING CERVICAL FACET JOINT Left 2021    Procedure: BLOCK, NERVE, FACET JOINT, CERVICAL, MEDIAL BRANCH;  Surgeon: Ruddy Smith Jr., MD;  Location: [facility];  Service: Pain Management;  Laterality: Left;  C3, 4, 5, 6    LAPAROSCOPIC CHOLECYSTECTOMY N/A 10/14/2020    Procedure: CHOLECYSTECTOMY, LAPAROSCOPIC;  Surgeon: Tad Ramirez MD;  Location: [facility] OR;  Service: General;  Laterality: N/A;    Reconstructive ankle surgery Right     TONSILLECTOMY, ADENOIDECTOMY      UPPER GASTROINTESTINAL ENDOSCOPY      Dr. 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   Respiratory:  Negative for cough, chest tightness, shortness of breath and wheezing.    Cardiovascular:  Negative for chest pain and palpitations.   Gastrointestinal:  Negative for abdominal pain, diarrhea, nausea and vomiting.   Musculoskeletal:  Negative for arthralgias.   Skin:  Positive for rash (area to left buttock. has been applying bactroban. onset earlier this week. not improving).   Neurological:  Negative for headaches.   Psychiatric/Behavioral:  Negative for dysphoric mood and sleep disturbance. The patient is not nervous/anxious.        Objective:      /80 (BP Location: Right arm, Patient Position: Sitting, BP Method: Medium (Manual))   Pulse 104   Temp 97.9 °F (36.6 °C)   Ht 5' 7" (1.702 m)   Wt 68 kg (149 lb 14.6 oz)   SpO2 99%   Breastfeeding Yes   BMI 23.48 kg/m²      Physical Exam  Constitutional:       Appearance: Normal appearance.   HENT:      Head: Normocephalic.   Neck:      Thyroid: No thyromegaly.      Vascular: No carotid bruit.   Cardiovascular:      Rate and Rhythm: Normal rate and regular rhythm.      Pulses: Normal pulses.      Heart sounds: Normal heart sounds. No murmur heard.  Pulmonary:      Effort: Pulmonary effort is normal.      Breath sounds: Normal breath sounds. No wheezing.   Musculoskeletal:      Cervical back: Normal range of motion.      Comments: Gait normal.  strong, equal   Skin:     General: Skin is warm and dry.      Capillary Refill: Capillary refill takes less than 2 seconds.      Comments: Infected abrasion to left buttock. Erythema to surrounding tissue.    Neurological:      General: No focal deficit present.      Mental Status: She is alert.   Psychiatric:         Attention and Perception: Attention and perception normal.         Mood and Affect: Mood and affect normal.         Speech: Speech normal.         Behavior: Behavior normal.       "   Assessment:       1. Infected abrasion        Plan:       Infected abrasion: keep area clean and dry. Apply bactroban ointment. Will cover with antibiotics. Take as directed.    Other orders  -     clindamycin (CLEOCIN) 300 MG capsule; Take 1 capsule (300 mg total) by mouth 3 (three) times daily.  Dispense: 21 capsule; Refill: 0       Continue current medication  Take medications only as prescribed  Healthy diet, exercise  Adequate rest  Adequate hydration  Avoid allergens  Avoid excessive caffeine      Follow up if not improving

## 2023-12-04 ENCOUNTER — PATIENT MESSAGE (OUTPATIENT)
Dept: ADMINISTRATIVE | Facility: HOSPITAL | Age: 33
End: 2023-12-04
Payer: COMMERCIAL

## 2024-01-04 ENCOUNTER — PATIENT MESSAGE (OUTPATIENT)
Dept: ADMINISTRATIVE | Facility: HOSPITAL | Age: 34
End: 2024-01-04
Payer: COMMERCIAL

## 2024-01-23 ENCOUNTER — HOSPITAL ENCOUNTER (OUTPATIENT)
Dept: RADIOLOGY | Facility: HOSPITAL | Age: 34
Discharge: HOME | End: 2024-01-23
Attending: INTERNAL MEDICINE
Payer: COMMERCIAL

## 2024-01-23 ENCOUNTER — OFFICE VISIT (OUTPATIENT)
Dept: RHEUMATOLOGY | Facility: CLINIC | Age: 34
End: 2024-01-23
Payer: COMMERCIAL

## 2024-01-23 VITALS
WEIGHT: 155.44 LBS | BODY MASS INDEX: 24.4 KG/M2 | HEART RATE: 81 BPM | SYSTOLIC BLOOD PRESSURE: 112 MMHG | DIASTOLIC BLOOD PRESSURE: 79 MMHG | HEIGHT: 67 IN

## 2024-01-23 DIAGNOSIS — M25.50 POLYARTHRALGIA: ICD-10-CM

## 2024-01-23 DIAGNOSIS — M53.3 PAIN OF BOTH SACROILIAC JOINTS: ICD-10-CM

## 2024-01-23 DIAGNOSIS — M79.89 BILATERAL HAND SWELLING: ICD-10-CM

## 2024-01-23 DIAGNOSIS — M79.671 RIGHT FOOT PAIN: ICD-10-CM

## 2024-01-23 DIAGNOSIS — M25.50 POLYARTHRALGIA: Primary | ICD-10-CM

## 2024-01-23 DIAGNOSIS — M79.641 BILATERAL HAND PAIN: ICD-10-CM

## 2024-01-23 DIAGNOSIS — M79.642 BILATERAL HAND PAIN: ICD-10-CM

## 2024-01-23 DIAGNOSIS — M79.89 SWELLING OF RIGHT FOOT: ICD-10-CM

## 2024-01-23 PROCEDURE — 72100 X-RAY EXAM L-S SPINE 2/3 VWS: CPT | Mod: 26,,, | Performed by: RADIOLOGY

## 2024-01-23 PROCEDURE — 1160F RVW MEDS BY RX/DR IN RCRD: CPT | Mod: CPTII,S$GLB,, | Performed by: INTERNAL MEDICINE

## 2024-01-23 PROCEDURE — 72202 X-RAY EXAM SI JOINTS 3/> VWS: CPT | Mod: TC

## 2024-01-23 PROCEDURE — 3078F DIAST BP <80 MM HG: CPT | Mod: CPTII,S$GLB,, | Performed by: INTERNAL MEDICINE

## 2024-01-23 PROCEDURE — 99999 PR PBB SHADOW E&M-EST. PATIENT-LVL IV: CPT | Mod: PBBFAC,,, | Performed by: INTERNAL MEDICINE

## 2024-01-23 PROCEDURE — 77077 JOINT SURVEY SINGLE VIEW: CPT | Mod: TC

## 2024-01-23 PROCEDURE — 99205 OFFICE O/P NEW HI 60 MIN: CPT | Mod: S$GLB,,, | Performed by: INTERNAL MEDICINE

## 2024-01-23 PROCEDURE — 72202 X-RAY EXAM SI JOINTS 3/> VWS: CPT | Mod: 26,,, | Performed by: RADIOLOGY

## 2024-01-23 PROCEDURE — 72100 X-RAY EXAM L-S SPINE 2/3 VWS: CPT | Mod: TC

## 2024-01-23 PROCEDURE — 3074F SYST BP LT 130 MM HG: CPT | Mod: CPTII,S$GLB,, | Performed by: INTERNAL MEDICINE

## 2024-01-23 PROCEDURE — 77077 JOINT SURVEY SINGLE VIEW: CPT | Mod: 26,,, | Performed by: RADIOLOGY

## 2024-01-23 PROCEDURE — 3008F BODY MASS INDEX DOCD: CPT | Mod: CPTII,S$GLB,, | Performed by: INTERNAL MEDICINE

## 2024-01-23 PROCEDURE — 1159F MED LIST DOCD IN RCRD: CPT | Mod: CPTII,S$GLB,, | Performed by: INTERNAL MEDICINE

## 2024-01-23 NOTE — PROGRESS NOTES
"Subjective:      Patient ID: Eve Blackwood is a 33 y.o. female.    Chief Complaint: Disease Management    HPI 33 year old F with cervical herniated disc, gluten sensitivity disease,  IBS here for evaluation. She reports that in 2019, she was 32 weeks pregnant.  She had fever and rash at that time along with joint pain. Joint pain did not go away and she was diagnosed with reactive arthritis. She has not taken anything consistently.  She will have flare ups that sometimes last weeks and sometimes months at a time.  Reports she is having more episodes of joint pain.  Reports she has pain in hands , wrists, hips and feet.  Pain level can be as high as 10/10.  Sometimes she feels like she has weakness in hands.  Sometimes,she has tingling in her hands.  Denies any rashes, recurrent oral ulcers, fevers or pleurisy. She reports she has had hair loss and was diagnosed with androgenic alopecia. Denies photosensitivity or Raynauds.    Denies personal or family history of psoriasis.    Family hx: niece: Celiac disease     Past Medical History:   Diagnosis Date    Abnormal Pap smear     Androgenic alopecia     Arthritis     Celiac disease     Chronic rhinitis     Decreased fetal movement 4/10/2019    Hair loss 1/30/2017    Headache(784.0)     Herniated cervical disc     IBS (irritable bowel syndrome)     Irregular uterine contractions 5/7/2019    Neck pain     PONV (postoperative nausea and vomiting)     Preeclampsia 6/13/2019       Review of Systems see HPI      Objective:   /79   Pulse 81   Ht 5' 7" (1.702 m)   Wt 70.5 kg (155 lb 6.8 oz)   BMI 24.34 kg/m²   Physical Exam   Constitutional: She is oriented to person, place, and time.   HENT:   Head: Normocephalic and atraumatic.   Right Ear: External ear normal.   Left Ear: External ear normal.   Nose: Nose normal.   Mouth/Throat: Oropharynx is clear and moist. No oropharyngeal exudate.   Eyes: Pupils are equal, round, and reactive to light. Conjunctivae are " normal. Right eye exhibits no discharge. Left eye exhibits no discharge. No scleral icterus.   Neck: No JVD present. No thyromegaly present.   Cardiovascular: Normal rate, regular rhythm and normal heart sounds. Exam reveals no gallop and no friction rub.   No murmur heard.  Pulmonary/Chest: Effort normal and breath sounds normal. No respiratory distress. She has no wheezes. She has no rales. She exhibits no tenderness.   Abdominal: Soft. Bowel sounds are normal. She exhibits no distension and no mass. There is no abdominal tenderness. There is no rebound and no guarding.   Musculoskeletal:         General: Tenderness present. No swelling, deformity or signs of injury.      Cervical back: Neck supple.   Lymphadenopathy:     She has no cervical adenopathy.   Neurological: She is alert and oriented to person, place, and time. No cranial nerve deficit. Gait normal. Coordination normal.   Skin: Skin is dry. No rash noted. No erythema. No pallor.   Psychiatric: Affect and judgment normal.     Tenderness in pips and mtps but no synovitis    No data to display     Assessment:   33 year old F with cervical herniated disc, gluten sensitivity disease,  IBS here for evaluation of joint pain.  She was previously evaluated by Dr. Masterson and was diagnosed with reactive arthritis after viral infection. Patient never started on plaquenil.  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  No diagnosis found.      Plan:     Problem List Items Addressed This Visit    None    Labs  Xrays  MRI of right foot, SI joints, and hands  60 * minutes of total time spent on the encounter, which includes face to face time and non-face to face time preparing to see the patient (eg, review of tests), Obtaining and/or reviewing separately obtained history, Documenting clinical information in the electronic or other health record, Independently interpreting results (not separately reported) and  communicating results to the patient/family/caregiver, or Care coordination (not separately reported).

## 2024-02-07 ENCOUNTER — PATIENT MESSAGE (OUTPATIENT)
Dept: RHEUMATOLOGY | Facility: CLINIC | Age: 34
End: 2024-02-07
Payer: COMMERCIAL

## 2024-02-09 RX ORDER — HYDROXYCHLOROQUINE SULFATE 200 MG/1
200 TABLET, FILM COATED ORAL 2 TIMES DAILY
Qty: 60 TABLET | Refills: 3 | Status: SHIPPED | OUTPATIENT
Start: 2024-02-09 | End: 2024-03-10

## 2024-03-14 ENCOUNTER — OFFICE VISIT (OUTPATIENT)
Dept: FAMILY MEDICINE | Facility: CLINIC | Age: 34
End: 2024-03-14
Payer: COMMERCIAL

## 2024-03-14 VITALS
DIASTOLIC BLOOD PRESSURE: 60 MMHG | OXYGEN SATURATION: 99 % | TEMPERATURE: 98 F | BODY MASS INDEX: 22.99 KG/M2 | WEIGHT: 146.5 LBS | SYSTOLIC BLOOD PRESSURE: 100 MMHG | HEIGHT: 67 IN | HEART RATE: 103 BPM

## 2024-03-14 DIAGNOSIS — D84.9 IMMUNOCOMPROMISED: ICD-10-CM

## 2024-03-14 DIAGNOSIS — R06.02 SHORTNESS OF BREATH: Primary | ICD-10-CM

## 2024-03-14 DIAGNOSIS — J20.9 BRONCHITIS WITH BRONCHOSPASM: ICD-10-CM

## 2024-03-14 PROCEDURE — 3008F BODY MASS INDEX DOCD: CPT | Mod: CPTII,S$GLB,, | Performed by: NURSE PRACTITIONER

## 2024-03-14 PROCEDURE — 3074F SYST BP LT 130 MM HG: CPT | Mod: CPTII,S$GLB,, | Performed by: NURSE PRACTITIONER

## 2024-03-14 PROCEDURE — 99214 OFFICE O/P EST MOD 30 MIN: CPT | Mod: S$GLB,,, | Performed by: NURSE PRACTITIONER

## 2024-03-14 PROCEDURE — 1160F RVW MEDS BY RX/DR IN RCRD: CPT | Mod: CPTII,S$GLB,, | Performed by: NURSE PRACTITIONER

## 2024-03-14 PROCEDURE — 1159F MED LIST DOCD IN RCRD: CPT | Mod: CPTII,S$GLB,, | Performed by: NURSE PRACTITIONER

## 2024-03-14 PROCEDURE — 3078F DIAST BP <80 MM HG: CPT | Mod: CPTII,S$GLB,, | Performed by: NURSE PRACTITIONER

## 2024-03-14 RX ORDER — HYDROXYCHLOROQUINE SULFATE 200 MG/1
200 TABLET, FILM COATED ORAL DAILY
COMMUNITY

## 2024-03-14 RX ORDER — AZITHROMYCIN 250 MG/1
TABLET, FILM COATED ORAL
Qty: 6 TABLET | Refills: 0 | Status: SHIPPED | OUTPATIENT
Start: 2024-03-14 | End: 2024-03-19

## 2024-03-14 RX ORDER — FLUTICASONE PROPIONATE AND SALMETEROL 250; 50 UG/1; UG/1
1 POWDER RESPIRATORY (INHALATION) 2 TIMES DAILY
Qty: 60 EACH | Refills: 0 | Status: SHIPPED | OUTPATIENT
Start: 2024-03-14 | End: 2024-04-05

## 2024-03-14 NOTE — PROGRESS NOTES
"Subjective:       Patient ID: Eve Blackwood is a 33 y.o. female.    Chief Complaint: Follow-up    Follow-up  Associated symptoms include coughing. Pertinent negatives include no abdominal pain, arthralgias, chest pain, fatigue, fever, headaches, nausea or vomiting.     Patient was seen at urgent care on 03/09/2024.  Was diagnosed with upper respiratory infection, laryngitis.  Was given steroids: Prednisone 20 mg 2 times daily for 3 days.  She is here here for follow-up.  Urgent care notes reviewed. States she feels "rumbling" in her chest. More SOB. No fever. Completed the prednisone. Taking mucinex/benadryl prn. See ROS    The following portion of the patients history was reviewed and updated as appropriate: allergies, current medications, past medical and surgical history. Past social history and problem list reviewed. Family PMH and Past social history reviewed. Tobacco, Illicit drug use reviewed.      Review of patient's allergies indicates:   Allergen Reactions    Vancomycin analogues Swelling and Rash    Ciprofloxacin Swelling    Pcn [penicillins] Other (See Comments)     unknown    Monistat 1 (tioconazole) [tioconazole] Hives         Current Outpatient Medications:     hydroxychloroquine (PLAQUENIL) 200 mg tablet, Take 200 mg by mouth once daily., Disp: , Rfl:     clindamycin (CLEOCIN) 300 MG capsule, Take 1 capsule (300 mg total) by mouth 3 (three) times daily. (Patient not taking: Reported on 3/14/2024), Disp: 21 capsule, Rfl: 0    diphenhydrAMINE (BENADRYL) 25 mg capsule, Take 25 mg by mouth nightly as needed., Disp: , Rfl:     guaiFENesin (MUCINEX) 600 mg 12 hr tablet, Take 1,200 mg by mouth 2 (two) times daily., Disp: , Rfl:     loratadine (CLARITIN) 10 mg tablet, Take 10 mg by mouth once daily., Disp: , Rfl:     Past Medical History:   Diagnosis Date    Abnormal Pap smear     Androgenic alopecia     Arthritis     Celiac disease     Chronic rhinitis     Decreased fetal movement 4/10/2019    Hair " loss 2017    Headache(784.0)     Herniated cervical disc     IBS (irritable bowel syndrome)     Irregular uterine contractions 2019    Neck pain     PONV (postoperative nausea and vomiting)     Preeclampsia 2019       Past Surgical History:   Procedure Laterality Date    BLADDER REPAIR       SECTION       SECTION N/A 2019    Procedure:  SECTION;  Surgeon: Cee Joaquin MD;  [facility];  Service: OB/GYN;  Laterality: N/A;     SECTION WITH TUBAL LIGATION N/A 2022    Procedure:  SECTION, WITH TUBAL LIGATION;  Surgeon: Cee Joaquin MD;  [facility];  Service: OB/GYN;  Laterality: N/A;    COLONOSCOPY      Dr. Tyson: normal findings per patient report    ESOPHAGOGASTRODUODENOSCOPY N/A 2020    Procedure: EGD (ESOPHAGOGASTRODUODENOSCOPY);  Surgeon: Jaya Villaseñor MD;  Location: [facility];  Service: Endoscopy;  Laterality: N/A;    EXTERNAL EAR SURGERY      for cauliflower ear ,    GALLBLADDER SURGERY      INJECTION OF ANESTHETIC AGENT AROUND MEDIAL BRANCH NERVES INNERVATING CERVICAL FACET JOINT Left 2021    Procedure: BLOCK, NERVE, FACET JOINT, CERVICAL, MEDIAL BRANCH;  Surgeon: Ruddy Smith Jr., MD;  Location: [facility];  Service: Pain Management;  Laterality: Left;  C3, 4, 5, 6    LAPAROSCOPIC CHOLECYSTECTOMY N/A 10/14/2020    Procedure: CHOLECYSTECTOMY, LAPAROSCOPIC;  Surgeon: Tad Ramirez MD;  Location: [facility] OR;  Service: General;  Laterality: N/A;    Reconstructive ankle surgery Right     TONSILLECTOMY, ADENOIDECTOMY      UPPER GASTROINTESTINAL ENDOSCOPY      Dr. 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   HENT:  Positive for postnasal drip.    Eyes:  Negative for visual disturbance.   Respiratory:  Positive for cough and shortness of breath. Negative for chest tightness and wheezing.    Cardiovascular:  Negative for chest pain, palpitations and leg swelling.   Gastrointestinal:  Negative for abdominal pain, blood in stool, diarrhea, nausea and vomiting.   Genitourinary: Negative.    Musculoskeletal:  Negative for arthralgias, back pain and gait problem.   Skin: Negative.    Neurological:  Negative for headaches.   Psychiatric/Behavioral:  Negative for dysphoric mood and sleep disturbance. The patient is not nervous/anxious.        Objective:      /60 (BP Location: Left arm, Patient Position: Sitting, BP Method: Medium (Manual))   Pulse 103   Temp 98.2 °F (36.8 °C)   Ht 5' 7" (1.702 m)   Wt 66.4 kg (146 lb 7.9 oz)   LMP 03/10/2024 (Approximate)   SpO2 99%   BMI 22.94 kg/m²      Physical Exam  Constitutional:       Appearance: Normal appearance.   HENT:      Head: Normocephalic.   Eyes:      Pupils: Pupils are equal, round, and reactive to light.   Cardiovascular:      Rate and Rhythm: Normal rate and regular rhythm.      Pulses: Normal pulses.      Heart sounds: Normal heart sounds. No murmur heard.  Pulmonary:      Effort: Pulmonary effort is normal.      Breath sounds: Examination of the right-upper field reveals rhonchi. Examination of the left-upper field reveals rhonchi. Examination of the right-lower field reveals rhonchi. Examination of the left-lower field reveals rhonchi. Wheezing and rhonchi present.   Musculoskeletal:         General: Normal range of motion.      Comments: Gait normal.    Skin:     General: Skin is warm and dry.      " Capillary Refill: Capillary refill takes less than 2 seconds.   Neurological:      General: No focal deficit present.      Mental Status: She is alert.   Psychiatric:         Attention and Perception: Attention and perception normal.         Mood and Affect: Mood and affect normal.         Speech: Speech normal.         Behavior: Behavior normal.         Assessment:       1. Shortness of breath    2. Bronchitis with bronchospasm    3. Immunocompromised        Plan:       Shortness of breath    Bronchitis with bronchospasm:  Advair inhaler.  Packer spit after each use.  Zithromax:  Due to duration of symptoms we will cover with antibiotics.  She is immunocompromised due to Plaquenil use.    Immunocompromised due to medication:  Discussed precautions.    Other orders  -     azithromycin (Z-CAROLINA) 250 MG tablet; Take 2 tablets by mouth on day 1; Take 1 tablet by mouth on days 2-5  Dispense: 6 tablet; Refill: 0  -     fluticasone-salmeterol diskus inhaler 250-50 mcg; Inhale 1 puff into the lungs 2 (two) times daily. Controller  Dispense: 60 each; Refill: 0       Continue current medication  Take medications only as prescribed  Healthy diet  Adequate rest  Adequate hydration  Avoid allergens  Avoid excessive caffeine     She does not want to go for x-ray today.  If not improving with treatment she will let me know so we can schedule a chest x-ray.

## 2024-04-05 RX ORDER — FLUTICASONE PROPIONATE AND SALMETEROL 250; 50 UG/1; UG/1
1 POWDER RESPIRATORY (INHALATION) 2 TIMES DAILY
Qty: 60 EACH | Refills: 0 | Status: SHIPPED | OUTPATIENT
Start: 2024-04-05 | End: 2024-05-03

## 2024-04-11 ENCOUNTER — PATIENT MESSAGE (OUTPATIENT)
Dept: ADMINISTRATIVE | Facility: HOSPITAL | Age: 34
End: 2024-04-11
Payer: COMMERCIAL

## 2024-05-03 RX ORDER — FLUTICASONE PROPIONATE AND SALMETEROL 250; 50 UG/1; UG/1
1 POWDER RESPIRATORY (INHALATION) 2 TIMES DAILY
Qty: 60 EACH | Refills: 0 | Status: SHIPPED | OUTPATIENT
Start: 2024-05-03

## 2024-05-07 ENCOUNTER — PATIENT MESSAGE (OUTPATIENT)
Dept: RHEUMATOLOGY | Facility: CLINIC | Age: 34
End: 2024-05-07
Payer: COMMERCIAL

## 2024-06-11 ENCOUNTER — PATIENT MESSAGE (OUTPATIENT)
Dept: ADMINISTRATIVE | Facility: HOSPITAL | Age: 34
End: 2024-06-11
Payer: COMMERCIAL

## 2024-06-20 ENCOUNTER — OFFICE VISIT (OUTPATIENT)
Dept: FAMILY MEDICINE | Facility: CLINIC | Age: 34
End: 2024-06-20
Payer: COMMERCIAL

## 2024-06-20 DIAGNOSIS — N39.0 URINARY TRACT INFECTION WITHOUT HEMATURIA, SITE UNSPECIFIED: Primary | ICD-10-CM

## 2024-06-20 DIAGNOSIS — B37.31 VAGINAL CANDIDA: ICD-10-CM

## 2024-06-20 PROCEDURE — 99212 OFFICE O/P EST SF 10 MIN: CPT | Mod: 95,,, | Performed by: NURSE PRACTITIONER

## 2024-06-20 PROCEDURE — 1159F MED LIST DOCD IN RCRD: CPT | Mod: CPTII,95,, | Performed by: NURSE PRACTITIONER

## 2024-06-20 PROCEDURE — 1160F RVW MEDS BY RX/DR IN RCRD: CPT | Mod: CPTII,95,, | Performed by: NURSE PRACTITIONER

## 2024-06-20 RX ORDER — FLUCONAZOLE 150 MG/1
150 TABLET ORAL DAILY
Qty: 3 TABLET | Refills: 0 | Status: SHIPPED | OUTPATIENT
Start: 2024-06-20 | End: 2024-06-23

## 2024-06-20 RX ORDER — SULFAMETHOXAZOLE AND TRIMETHOPRIM 800; 160 MG/1; MG/1
1 TABLET ORAL 2 TIMES DAILY
Qty: 8 TABLET | Refills: 0 | Status: SHIPPED | OUTPATIENT
Start: 2024-06-20

## 2024-06-20 NOTE — PROGRESS NOTES
Answers submitted by the patient for this visit:  Painful Urination Questionnaire (Submitted on 6/20/2024)  Chief Complaint: Dysuria  Chronicity: new  Onset: in the past 7 days  Frequency: intermittently  Progression since onset: unchanged  Pain quality: burning  Pain - numeric: 5/10  Fever: no fever  Fever duration: less than 1 day  Sexually active?: Yes  History of pyelonephritis?: No  chills: No  discharge: No  flank pain: Yes  frequency: Yes  hematuria: No  hesitancy: No  nausea: No  possible pregnancy: No  sweats: No  urgency: Yes  vomiting: No  constipation: No  rash: No  weight loss: No  withholding: No  behavior changes: No  Treatments tried: increased fluids  Improvement on treatment: mild  Pain severity: moderate  catheterization: Yes  diabetes insipidus: No  diabetes mellitus: No  genitourinary reflux: No  hypertension: No  recurrent UTIs: No  single kidney: No  STD: No  urinary stasis: No  urological procedure: No  kidney stones: No    The patient location is: [place]  The chief complaint leading to consultation is: UTI symptoms    Visit type: audiovisual    Face to Face time with patient: 12  13 minutes of total time spent on the encounter, which includes face to face time and non-face to face time preparing to see the patient (eg, review of tests), Obtaining and/or reviewing separately obtained history, Documenting clinical information in the electronic or other health record, Independently interpreting results (not separately reported) and communicating results to the patient/family/caregiver, or Care coordination (not separately reported).     Each patient to whom he or she provides medical services by telemedicine is:  (1) informed of the relationship between the physician and patient and the respective role of any other health care provider with respect to management of the patient; and (2) notified that he or she may decline to receive medical services by telemedicine and may withdraw from such  care at any time.    Subjective:       Patient ID: Eve Blackwood is a 34 y.o. female.    Chief Complaint: UTI symptoms    Dysuria   This is a new problem. The current episode started in the past 7 days. The problem occurs intermittently. The problem has been unchanged. The quality of the pain is described as burning. The pain is at a severity of 5/10. The pain is moderate. There has been no fever. The fever has been present for Less than 1 day. She is Sexually active. There is No history of pyelonephritis. Associated symptoms include flank pain, frequency and urgency. Pertinent negatives include no behavior changes, chills, discharge, hematuria, hesitancy, nausea, possible pregnancy, sweats, vomiting, weight loss, constipation, rash or withholding. She has tried increased fluids for the symptoms. The treatment provided mild relief. Her past medical history is significant for catheterization. There is no history of diabetes insipidus, diabetes mellitus, genitourinary reflux, hypertension, kidney stones, recurrent UTIs, a single kidney, STD, urinary stasis or a urological procedure.     Onset over the past few days. Having more urgency, frequency. States some burning and itching in the vaginal area. Has felt like she has UTI and yeast infection. Some mild lower back discomfort, but feels more mid lumbar instead of flank. Has not noticed any hematuria. Urine has smelled strong. Has been hydrating well. Denies fever. See ROS    The following portion of the patients history was reviewed and updated as appropriate: allergies, current medications, past medical and surgical history. Past social history and problem list reviewed. Family PMH and Past social history reviewed. Tobacco, Illicit drug use reviewed.      Review of patient's allergies indicates:   Allergen Reactions    Vancomycin analogues Swelling and Rash    Ciprofloxacin Swelling    Pcn [penicillins] Other (See Comments)     unknown    Monistat 1  (tioconazole) [tioconazole] Hives         Current Outpatient Medications:     diphenhydrAMINE (BENADRYL) 25 mg capsule, Take 25 mg by mouth nightly as needed., Disp: , Rfl:     guaiFENesin (MUCINEX) 600 mg 12 hr tablet, Take 1,200 mg by mouth 2 (two) times daily., Disp: , Rfl:     hydroxychloroquine (PLAQUENIL) 200 mg tablet, Take 200 mg by mouth once daily., Disp: , Rfl:     loratadine (CLARITIN) 10 mg tablet, Take 10 mg by mouth once daily., Disp: , Rfl:     WIXELA INHUB 250-50 mcg/dose diskus inhaler, INHALE 1 PUFF INTO THE LUNGS 2 (TWO) TIMES DAILY. CONTROLLER, Disp: 60 each, Rfl: 0    Past Medical History:   Diagnosis Date    Abnormal Pap smear     Androgenic alopecia     Arthritis     Celiac disease     Chronic rhinitis     Decreased fetal movement 4/10/2019    Hair loss 2017    Headache(784.0)     Herniated cervical disc     IBS (irritable bowel syndrome)     Irregular uterine contractions 2019    Neck pain     PONV (postoperative nausea and vomiting)     Preeclampsia 2019       Past Surgical History:   Procedure Laterality Date    BLADDER REPAIR       SECTION       SECTION N/A 2019    Procedure:  SECTION;  Surgeon: Cee Joaquin MD;  [facility];  Service: OB/GYN;  Laterality: N/A;     SECTION WITH TUBAL LIGATION N/A 2022    Procedure:  SECTION, WITH TUBAL LIGATION;  Surgeon: Cee Joaquin MD;  [facility];  Service: OB/GYN;  Laterality: N/A;    COLONOSCOPY      Dr. Tyson: normal findings per patient report    ESOPHAGOGASTRODUODENOSCOPY N/A 2020    Procedure: EGD (ESOPHAGOGASTRODUODENOSCOPY);  Surgeon: Jaya Villaseñor MD;  Location: [facility];  Service: Endoscopy;  Laterality: N/A;    EXTERNAL EAR SURGERY      for cauliflower ear ,    GALLBLADDER SURGERY      INJECTION OF ANESTHETIC AGENT AROUND MEDIAL BRANCH NERVES INNERVATING CERVICAL FACET JOINT Left 2021    Procedure: BLOCK, NERVE, FACET  JOINT, CERVICAL, MEDIAL BRANCH;  Surgeon: Ruddy Smith Jr., MD;  Location: [facility];  Service: Pain Management;  Laterality: Left;  C3, 4, 5, 6    LAPAROSCOPIC CHOLECYSTECTOMY N/A 10/14/2020    Procedure: CHOLECYSTECTOMY, LAPAROSCOPIC;  Surgeon: Tad Ramirez MD;  Location: [facility] OR;  Service: General;  Laterality: N/A;    Reconstructive ankle surgery Right     TONSILLECTOMY, ADENOIDECTOMY      UPPER GASTROINTESTINAL ENDOSCOPY  2011    Dr. 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     Social Determinants of Health     Financial Resource Strain: Low Risk  (6/20/2024)    Overall Financial Resource Strain (CARDIA)     Difficulty of Paying Living Expenses: Not hard at all   Food Insecurity: No Food Insecurity (6/20/2024)    Hunger Vital Sign     Worried About Running Out of Food in the Last Year: Never true     Ran Out of Food in the Last Year: Never true   Physical Activity: Insufficiently Active (6/20/2024)    Exercise Vital Sign     Days of Exercise per Week: 3 days     Minutes of Exercise per Session: 30 min   Stress: No Stress Concern Present (6/20/2024)    [employer] - Occupational Stress Questionnaire     Feeling of Stress : Not at all   Housing Stability: Unknown (6/20/2024)    Housing Stability Vital Sign     Unable to Pay for Housing in the Last Year: No     Review of Systems   Constitutional:  Negative for chills and weight loss.   Gastrointestinal:  Negative for constipation, nausea and vomiting.    Genitourinary:  Positive for dysuria, flank pain, frequency and urgency. Negative for hematuria and hesitancy.        Redness in the vaginal itching. Vaginal itching.  Did OTC yeast infection medication, has improved. Odor in urine   Musculoskeletal:  Positive for back pain (lower back).   Skin:  Negative for rash.       Objective:         Physical Exam  Constitutional:       General: She is not in acute distress.     Appearance: She is well-developed.   Pulmonary:      Effort: No accessory muscle usage or respiratory distress.   Musculoskeletal:      Cervical back: Normal range of motion.      Comments: Coordination appears normal per video   Neurological:      Mental Status: She is alert and oriented to person, place, and time.   Psychiatric:         Speech: Speech normal.         Behavior: Behavior normal.         Thought Content: Thought content normal.         Assessment:       1. Urinary tract infection without hematuria, site unspecified    2. Vaginal candida        Plan:       Urinary tract infection without hematuria, site unspecified: will cover with antibiotics. Take as directed. Hydrate well    Vaginal candida: diflucan. Take one every other day x 3.     Other orders  -     sulfamethoxazole-trimethoprim 800-160mg (BACTRIM DS) 800-160 mg Tab; Take 1 tablet by mouth 2 (two) times daily.  Dispense: 8 tablet; Refill: 0  -     fluconazole (DIFLUCAN) 150 MG Tab; Take 1 tablet (150 mg total) by mouth once daily. for 3 days  Dispense: 3 tablet; Refill: 0     Continue current medication  Take medications only as prescribed  Healthy diet, exercise  Adequate rest  Adequate hydration  Avoid allergens  Avoid excessive caffeine     Follow up if not improving.

## 2024-07-02 ENCOUNTER — PATIENT MESSAGE (OUTPATIENT)
Dept: ADMINISTRATIVE | Facility: HOSPITAL | Age: 34
End: 2024-07-02
Payer: COMMERCIAL

## 2024-09-30 ENCOUNTER — OFFICE VISIT (OUTPATIENT)
Dept: NEUROLOGY | Facility: CLINIC | Age: 34
End: 2024-09-30
Payer: COMMERCIAL

## 2024-09-30 VITALS
HEART RATE: 80 BPM | WEIGHT: 156.94 LBS | SYSTOLIC BLOOD PRESSURE: 114 MMHG | HEIGHT: 67 IN | DIASTOLIC BLOOD PRESSURE: 79 MMHG | BODY MASS INDEX: 24.63 KG/M2

## 2024-09-30 DIAGNOSIS — G43.009 MIGRAINE WITHOUT AURA AND WITHOUT STATUS MIGRAINOSUS, NOT INTRACTABLE: Primary | ICD-10-CM

## 2024-09-30 PROCEDURE — 1160F RVW MEDS BY RX/DR IN RCRD: CPT | Mod: CPTII,S$GLB,, | Performed by: PHYSICIAN ASSISTANT

## 2024-09-30 PROCEDURE — 3074F SYST BP LT 130 MM HG: CPT | Mod: CPTII,S$GLB,, | Performed by: PHYSICIAN ASSISTANT

## 2024-09-30 PROCEDURE — 1159F MED LIST DOCD IN RCRD: CPT | Mod: CPTII,S$GLB,, | Performed by: PHYSICIAN ASSISTANT

## 2024-09-30 PROCEDURE — 99999 PR PBB SHADOW E&M-EST. PATIENT-LVL III: CPT | Mod: PBBFAC,,, | Performed by: PHYSICIAN ASSISTANT

## 2024-09-30 PROCEDURE — 3078F DIAST BP <80 MM HG: CPT | Mod: CPTII,S$GLB,, | Performed by: PHYSICIAN ASSISTANT

## 2024-09-30 PROCEDURE — 3008F BODY MASS INDEX DOCD: CPT | Mod: CPTII,S$GLB,, | Performed by: PHYSICIAN ASSISTANT

## 2024-09-30 PROCEDURE — 99205 OFFICE O/P NEW HI 60 MIN: CPT | Mod: S$GLB,,, | Performed by: PHYSICIAN ASSISTANT

## 2024-09-30 RX ORDER — GALCANEZUMAB 120 MG/ML
INJECTION, SOLUTION SUBCUTANEOUS
Qty: 1 ML | Refills: 11 | Status: SHIPPED | OUTPATIENT
Start: 2024-09-30

## 2024-09-30 RX ORDER — RIZATRIPTAN BENZOATE 10 MG/1
TABLET, ORALLY DISINTEGRATING ORAL
Qty: 8 TABLET | Refills: 6 | Status: SHIPPED | OUTPATIENT
Start: 2024-09-30

## 2024-09-30 RX ORDER — GALCANEZUMAB 120 MG/ML
INJECTION, SOLUTION SUBCUTANEOUS
Qty: 2 ML | Refills: 0 | Status: SHIPPED | OUTPATIENT
Start: 2024-09-30

## 2024-09-30 NOTE — PATIENT INSTRUCTIONS
Consider tracking your headaches, migraine yoandy free kamran      Maintenance:   Emgality shots every 28 days (noted first month, loading dose of 2 shots)      HA :  Maxalt MLT written

## 2024-09-30 NOTE — PROGRESS NOTES
[facility]  [address] [facility] [address]  [place] [address]  Phone:[phone]  Fax: [phone]   New Patient Consultation  Followed in past by Dr. Tovar (LOV 2020)(    Patient Name: Eve Blackwood  : 1990  MRN:  [id number]  Today: 2024   chief complaint: Migraine    PCP: Milly Cortez, PARVIN.       Assessment:   Eve Blackwood is a 34 y.o. right handed female with a PMHx of: arthritis, celiac disease, TMJ,  HA, back pain, IBS-D, neck pain   whom presents with her son  in self referral for HA. Appears to have near chronic migraines (rarely with aura).         Review:    ICD-10-CM ICD-9-CM   1. Migraine without aura and without status migrainosus, not intractable  G43.009 346.10         Plan:   Discussed realistic goals of care with patient at length. Discussed medication options, need for lifestyle adjustment. Discussed treatment will take time. Goal will be to reduce frequency/intensity/quantity of HA, not to be completely HA free. Gave copy of [facility] triggers for migraine informational sheet (N.b., a standard I give to patients who come to seek my care in HA clinic, regardless if they have migraines or not) and discussed clinic's non narcotic policy re: HA. Patient voiced understanding and agreement.            -will have patient track HA, discussed kamran for smart phone           -will have patient work on lifestyle           -if HA change in quality/nature, will get updated imaging study             -discussed potential for teratogenicity with treatment, if her family planning status should change, discussed I'd like her to let office know immediately. She voice agreement    For HA Prevention:  1 restart emgality, rediscussed adv effects/dosing, she agreed        For HA :  Maxalt MLT ordered, discussed adv effects/dosing, she agreed     To break up Headaches:  N/a    Other:  N/a           All test results as well as any  necessary instructions were reviewed and discussed with patient.    Review:  Orders Placed This Encounter    galcanezumab-gnlm (EMGALITY PEN) 120 mg/mL PnIj    galcanezumab-gnlm (EMGALITY PEN) 120 mg/mL PnIj    rizatriptan (MAXALT-MLT) 10 MG disintegrating tablet         Patient to return to PCP/other specialists for all other problems  Patient to continue on all medications as Rx'd   A detailed AVS was provided to the patient with patient readback   RTO- 2 months to check in   The patient indicates understanding of these issues and agrees to the plan.    HPI:   Eve Blackwood is a 34 y.o.right handed, female with a PMHx of: arthritis, celiac disease, TMJ,  HA, back pain, IBS-D, neck pain   whom presents with her son  in self referral for HA.           HA HPI:  Start:HA since 13 years old, was followed by Dr. Tovar, was on emgality until pregnancy (stopped at that time and has been off since) and then UGARTE came back 6 months ago, and has persisted since  History of trauma (no), History of CNS infection (no), History of Stroke (no)  Location:right side of head   Severity: range: 8-10/10  AURA: visual symptoms   Duration:3-4 day   Frequency:12-15 HD/30, was 2/30 with emgality   Associated factors (bolded positive) WITH HA ( or migraine): Nausea, vomiting, photophobia, phonophobia, tinnitus, scalp pain, vision loss, diplopia, scintillations, eye pain, jaw pain, weakness?  <--denies   Tried:excedrin migraine or ibuprofen   Triggers (in bold): stress, lack of sleep, too much caffeine, too little caffeine, weather change, seasonal change, strong odours, bright lights, sunlight, food    Last HA: 10 days ago   Positives in bold: Hx of Kidney Stones, asthma, GI bleed, osteoporosis, CAD/MI, CVA/TIA, DM  <---denies   Imaging on file: >10 years ago, not in [facility]   Therapies tried in past: (failures to be marked, if known---why did it  fail?)  Topamax  Imitrex  Aldactone  Deltasone  Lyrica  Zofran  Robaxin  MgOx  Toradol  Emgality  Lasix  Lexapro  Flexeril  Fioricet  maxalt          Medication Reconciliation:   Current Outpatient Medications   Medication Sig Dispense Refill    diphenhydrAMINE (BENADRYL) 25 mg capsule Take 25 mg by mouth nightly as needed.      hydroxychloroquine (PLAQUENIL) 200 mg tablet Take 200 mg by mouth once daily.       No current facility-administered medications for this visit.     Review of patient's allergies indicates:   Allergen Reactions    Vancomycin analogues Swelling and Rash    Ciprofloxacin Swelling    Pcn [penicillins] Other (See Comments)     unknown    Monistat 1 (tioconazole) [tioconazole] Hives       PMHx:  Past Medical History:   Diagnosis Date    Abnormal Pap smear     Androgenic alopecia     Arthritis     Celiac disease     Chronic rhinitis     Decreased fetal movement 4/10/2019    Hair loss 2017    Headache(784.0)     Herniated cervical disc     IBS (irritable bowel syndrome)     Irregular uterine contractions 2019    Neck pain     PONV (postoperative nausea and vomiting)     Preeclampsia 2019     Rhizotomy  ([facility]) Dr. Ding   Past Surgical History:   Procedure Laterality Date    BLADDER REPAIR       SECTION       SECTION N/A 2019    Procedure:  SECTION;  Surgeon: Cee Joaquin MD;  Location: [facility] L&D;  Service: OB/GYN;  Laterality: N/A;     SECTION WITH TUBAL LIGATION N/A 2022    Procedure:  SECTION, WITH TUBAL LIGATION;  Surgeon: Cee Joaquin MD;  Location: [facility] L&D;  Service: OB/GYN;  Laterality: N/A;    COLONOSCOPY      Dr. Tyson: normal findings per patient report    ESOPHAGOGASTRODUODENOSCOPY N/A 2020    Procedure: EGD (ESOPHAGOGASTRODUODENOSCOPY);  Surgeon: Jaya Villaseñor MD;  Location: [facility];  Service: Endoscopy;  Laterality: N/A;    EXTERNAL EAR SURGERY      for cauliflower ear ,    GALLBLADDER  SURGERY  2021    INJECTION OF ANESTHETIC AGENT AROUND MEDIAL BRANCH NERVES INNERVATING CERVICAL FACET JOINT Left 07/12/2021    Procedure: BLOCK, NERVE, FACET JOINT, CERVICAL, MEDIAL BRANCH;  Surgeon: Ruddy Smith Jr., MD;  Location: [facility];  Service: Pain Management;  Laterality: Left;  C3, 4, 5, 6    LAPAROSCOPIC CHOLECYSTECTOMY N/A 10/14/2020    Procedure: CHOLECYSTECTOMY, LAPAROSCOPIC;  Surgeon: Tad Ramirez MD;  Location: [facility];  Service: General;  Laterality: N/A;    Reconstructive ankle surgery Right     TONSILLECTOMY, ADENOIDECTOMY      UPPER GASTROINTESTINAL ENDOSCOPY  2011    Dr. Tyson: normal findings per patient report       Fhx:  Family History   Problem Relation Name Age of Onset    Migraines Mother      Allergies Mother      Allergic rhinitis Mother      Heart disease Father      Hyperlipidemia Father      Migraines Father      Cancer Father      Allergies Father      Allergic rhinitis Father      Migraines Sister      Heart disease Maternal Grandmother      Allergies Maternal Grandmother      Allergic rhinitis Maternal Grandmother      Heart disease Maternal Grandfather      Allergies Maternal Grandfather      Allergic rhinitis Maternal Grandfather      Allergies Paternal Grandmother      Allergic rhinitis Paternal Grandmother      Allergies Paternal Grandfather      Allergic rhinitis Paternal Grandfather      Colon polyps Other great maternal uncle     Angioedema Neg Hx      Asthma Neg Hx      Atopy Neg Hx      Eczema Neg Hx      Immunodeficiency Neg Hx      Rhinitis Neg Hx      Urticaria Neg Hx      Colon cancer Neg Hx      Crohn's disease Neg Hx      Stomach cancer Neg Hx      Ulcerative colitis Neg Hx      Esophageal cancer Neg Hx         Shx: [employer], has 3 boys    Social History     Socioeconomic History    Marital status:    Tobacco Use    Smoking status: Never     Passive exposure: Never    Smokeless tobacco: Never   Substance and Sexual Activity    Alcohol use: Not  Currently     Comment: occasional- maybe 1-2 beers a month    Drug use: No    Sexual activity: Yes     Partners: Male   Social History Narrative    Employed    engaged- wedding in 11/2018; [employer]/nuclear med tech; 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     Social Drivers of Health     Financial Resource Strain: Low Risk  (6/20/2024)    Overall Financial Resource Strain (CARDIA)     Difficulty of Paying Living Expenses: Not hard at all   Food Insecurity: No Food Insecurity (6/20/2024)    Hunger Vital Sign     Worried About Running Out of Food in the Last Year: Never true     Ran Out of Food in the Last Year: Never true   Physical Activity: Insufficiently Active (6/20/2024)    Exercise Vital Sign     Days of Exercise per Week: 3 days     Minutes of Exercise per Session: 30 min   Stress: No Stress Concern Present (6/20/2024)    [employer] [place] [employer] - Occupational Stress Questionnaire     Feeling of Stress : Not at all   Housing Stability: Unknown (6/20/2024)    Housing Stability Vital Sign     Unable to Pay for Housing in the Last Year: No           Labs:   Reviewed in chart     Imaging:   Reviewed in chart        Other testing:  Reviewed in chart     Note: I have independently reviewed any/all imaging/labs/tests and agree with the report (s) as documented.  Any discrepancies will be as noted/demarcated by free text.  ERICA DIGGS 9/30/2024                     ROS:   Review Of Systems (questions asked, positive or additions in BOLD)  Gen: Weight change, fatigue/malaise, pyrexia   HEENT: Tinnitus, headache,  blurred vision, eye pain, diplopia, photophobia,  nose bleeds, congestion, sore throat, jaw pain, scalp pain, neck stiffness   Card: Palpitations, CP   Pulm: SOB, cough   Vas: Easy bruising, easy bleeding   GI: N/V/D/C, incontinence, hematemesis, hematochezia    : incontinence, hematuria   Endocrine: Temp intolerance, polyuria, polydipsia   M/S: Neck pain, myalgia, back pain, joint pain, falls  "   Neuro: PER HPI   PSY: Memory loss, confusion, depression, anxiety, trouble in sleep           EXAM:   /79 (Patient Position: Sitting)   Pulse 80   Ht 5' 7" (1.702 m)   Wt 71.2 kg (156 lb 15.5 oz)   BMI 24.58 kg/m²    GEN:  NAD  HEENT: NC/AT, Frontalis was NTTP, temporalis was NTTP,  nares patent, dentition appropriate,  neck supple, trachea midline      EXTREM:   no edema present.    NEURO:  Mental Status:  Awake, alert and appropriately oriented to time, place, and person.  Normal attention and concentration.  Speech is fluent and appropriate language function (I.e., comprehension).     Cranial Nerves:     Pupils are equal and reactive to light.  Extraocular movements are intact and without nystagmus.  Visual fields are full to confrontation testing.   Facial movement is symmetric.  Facial sensation is intact.  Hearing is normal. Uvula in midline. FROM of neck in all (6) directions, shoulder shrug symmetrical. Tongue in midline without fasiculation.     Motor:  RUE:appropriate against gravity and medium force as tested 5/5              LUE: appropriate against gravity and medium force as tested 5/5              RLE:appropriate against gravity and medium force as tested 5/5              LLE: appropriate against gravity and medium force as tested 5/5  Tremor/pronator drift not apparent.     finger extension strength was strong bilat    Sensory:  RUE  intact light touch, proprioception, and temperature  LUE intact light touch, proprioception, and temperature    RLE intact light touch  LLE intact light touch      DTR's:                                            R              L  biceps 2+ 2+         brachioradialis 2+ 2+   Knee jerk 2+ 2+        Coordination:  FTN-WNL.       Gait and Stance: Normal manner of stance and gait function testing.         This document has been electronically signed by Mr. Marco Antonio Benito MPA, PA-C on 9/30/2024, I have personally typed this message using the EMR.       Dr" MD Barrie  was available during today's visit.     Personal Protective Equipment:    Personal Protective Equipment was used during this encounter including:   non latex gloves.          CC: Milly Cortez NP             Yes

## 2024-10-03 ENCOUNTER — PATIENT MESSAGE (OUTPATIENT)
Dept: ADMINISTRATIVE | Facility: HOSPITAL | Age: 34
End: 2024-10-03
Payer: COMMERCIAL

## 2024-11-06 ENCOUNTER — PATIENT OUTREACH (OUTPATIENT)
Dept: ADMINISTRATIVE | Facility: HOSPITAL | Age: 34
End: 2024-11-06
Payer: COMMERCIAL

## 2024-11-26 ENCOUNTER — OFFICE VISIT (OUTPATIENT)
Dept: NEUROLOGY | Facility: CLINIC | Age: 34
End: 2024-11-26
Payer: COMMERCIAL

## 2024-11-26 VITALS
DIASTOLIC BLOOD PRESSURE: 75 MMHG | BODY MASS INDEX: 25.47 KG/M2 | TEMPERATURE: 98 F | RESPIRATION RATE: 17 BRPM | SYSTOLIC BLOOD PRESSURE: 121 MMHG | HEIGHT: 67 IN | HEART RATE: 77 BPM | WEIGHT: 162.25 LBS

## 2024-11-26 DIAGNOSIS — G43.009 MIGRAINE WITHOUT AURA AND WITHOUT STATUS MIGRAINOSUS, NOT INTRACTABLE: Primary | ICD-10-CM

## 2024-11-26 PROCEDURE — 1160F RVW MEDS BY RX/DR IN RCRD: CPT | Mod: CPTII,S$GLB,, | Performed by: PHYSICIAN ASSISTANT

## 2024-11-26 PROCEDURE — 3008F BODY MASS INDEX DOCD: CPT | Mod: CPTII,S$GLB,, | Performed by: PHYSICIAN ASSISTANT

## 2024-11-26 PROCEDURE — 99999 PR PBB SHADOW E&M-EST. PATIENT-LVL III: CPT | Mod: PBBFAC,,, | Performed by: PHYSICIAN ASSISTANT

## 2024-11-26 PROCEDURE — 1159F MED LIST DOCD IN RCRD: CPT | Mod: CPTII,S$GLB,, | Performed by: PHYSICIAN ASSISTANT

## 2024-11-26 PROCEDURE — 99213 OFFICE O/P EST LOW 20 MIN: CPT | Mod: S$GLB,,, | Performed by: PHYSICIAN ASSISTANT

## 2024-11-26 PROCEDURE — 3074F SYST BP LT 130 MM HG: CPT | Mod: CPTII,S$GLB,, | Performed by: PHYSICIAN ASSISTANT

## 2024-11-26 PROCEDURE — 3078F DIAST BP <80 MM HG: CPT | Mod: CPTII,S$GLB,, | Performed by: PHYSICIAN ASSISTANT

## 2024-11-26 RX ORDER — RIZATRIPTAN BENZOATE 10 MG/1
TABLET, ORALLY DISINTEGRATING ORAL
Qty: 8 TABLET | Refills: 6 | Status: SHIPPED | OUTPATIENT
Start: 2024-11-26

## 2024-11-26 RX ORDER — PROCHLORPERAZINE MALEATE 10 MG
TABLET ORAL
Qty: 20 TABLET | Refills: 1 | Status: SHIPPED | OUTPATIENT
Start: 2024-11-26

## 2024-11-26 RX ORDER — MONTELUKAST SODIUM 10 MG/1
10 TABLET ORAL NIGHTLY
COMMUNITY

## 2024-11-26 NOTE — PROGRESS NOTES
"[facility]  [facility]  [address] [facility]  [place] [address]  Phone:[phone]  Fax: [phone]  Follow up visit    Patient Name: Eve Blackwood  : 1990  MRN:  [id number]  Today: 2024   LOV: 2024  chief complaint: Headache    PCP: Milly Cortez, PARVIN.       Assessment:   Eve Blackwood is a 34 y.o. right handed female with a PMHx of: arthritis, celiac disease, TMJ,  HA, back pain, IBS-D, neck pain   whom presents solo in follow up for HA.  Historically chronic migraine, on her current regimen, doing well.         Review:    ICD-10-CM ICD-9-CM   1. Migraine without aura and without status migrainosus, not intractable  G43.009 346.10           Plan:               -if HA change in quality/nature, will get updated imaging study        For HA Prevention:  Emgality 1 shot Q28 days        For UGARTE :  Maxalt MLT refilled     To break up Headaches:  Compazine 1 pill TID PRN HA/nausea, no driving/may cause sedation     Other:  N/a           All test results as well as any necessary instructions were reviewed and discussed with patient.    Review:  Orders Placed This Encounter    rizatriptan (MAXALT-MLT) 10 MG disintegrating tablet    prochlorperazine (COMPAZINE) 10 MG tablet           Patient to return to PCP/other specialists for all other problems  Patient to continue on all medications as Rx'd  Full office note available online   RTO- 6 months to check in   The patient indicates understanding of these issues and agrees to the plan.    HPI:   Eve Blackwood is a 34 y.o.right handed, female with a PMHx of: arthritis, celiac disease, TMJ,  HA, back pain, IBS-D, neck pain   whom presents solo in follow up for HA.     At last OV (2024): restarted emgality and maxalt MLT ordered.   5-6 HD a month ("closer together")  Maxalt is abortive, no side effects  Emgality doing well   Right side of head  Last HA was over the " weekend   Will have nausea with some of her HA  Sometimes can go through maxalt (multiple days use) during a string of HA         HA historically:   Start:HA since 13 years old, was followed by Dr. Tovar, was on emgality until pregnancy (stopped at that time and has been off since) and then UGARTE came back 6 months ago, and has persisted since  History of trauma (no), History of CNS infection (no), History of Stroke (no)  Location:right side of head   Severity: range: 8-10/10  AURA: visual symptoms   Duration:3-4 day   Frequency:12-15 HD/30, was 2/30 with emgality   Associated factors (bolded positive) WITH HA ( or migraine): Nausea, vomiting, photophobia, phonophobia, tinnitus, scalp pain, vision loss, diplopia, scintillations, eye pain, jaw pain, weakness?  <--denies   Tried:excedrin migraine or ibuprofen   Triggers (in bold): stress, lack of sleep, too much caffeine, too little caffeine, weather change, seasonal change, strong odours, bright lights, sunlight, food    Last HA: 10 days ago   Positives in bold: Hx of Kidney Stones, asthma, GI bleed, osteoporosis, CAD/MI, CVA/TIA, DM  <---denies   Imaging on file: >10 years ago, not in [facility]   Therapies tried in past: (failures to be marked, if known---why did it fail?)  Topamax  Imitrex  Aldactone  Deltasone  Lyrica  Zofran  Robaxin  MgOx  Toradol  Emgality  Lasix  Lexapro  Flexeril  Fioricet  maxalt          Medication Reconciliation:   Current Outpatient Medications   Medication Sig Dispense Refill    diphenhydrAMINE (BENADRYL) 25 mg capsule Take 25 mg by mouth nightly as needed.      galcanezumab-gnlm (EMGALITY PEN) 120 mg/mL PnIj Inject both (2 mLs total) once into the skin as the loading dose. 2 mL 0    galcanezumab-gnlm (EMGALITY PEN) 120 mg/mL PnIj Maintenance dose, starting month 2, inject 1 mL into the skin once every 28 days 1 mL 11    hydroxychloroquine (PLAQUENIL) 200 mg tablet Take 200 mg by mouth once daily.      rizatriptan (MAXALT-MLT) 10 MG  disintegrating tablet 1 melt at onset headache, second melt 2 hours later if needed, no more than 2 melts day/3 days use in week 8 tablet 6     No current facility-administered medications for this visit.     Review of patient's allergies indicates:   Allergen Reactions    Vancomycin analogues Swelling and Rash    Ciprofloxacin Swelling    Pcn [penicillins] Other (See Comments)     unknown    Monistat 1 (tioconazole) [tioconazole] Hives       PMHx:  Past Medical History:   Diagnosis Date    Abnormal Pap smear     Androgenic alopecia     Arthritis     Celiac disease     Chronic rhinitis     Decreased fetal movement 4/10/2019    Hair loss 2017    Headache(784.0)     Herniated cervical disc     IBS (irritable bowel syndrome)     Irregular uterine contractions 2019    Neck pain     PONV (postoperative nausea and vomiting)     Preeclampsia 2019     Rhizotomy  ([facility]) Dr. Ding   Past Surgical History:   Procedure Laterality Date    BLADDER REPAIR       SECTION       SECTION N/A 2019    Procedure:  SECTION;  Surgeon: Cee Joaquin MD;  Location: [facility] L&D;  Service: OB/GYN;  Laterality: N/A;     SECTION WITH TUBAL LIGATION N/A 2022    Procedure:  SECTION, WITH TUBAL LIGATION;  Surgeon: Cee Joaquin MD;  Location: [facility] L&D;  Service: OB/GYN;  Laterality: N/A;    COLONOSCOPY      Dr. Tyson: normal findings per patient report    ESOPHAGOGASTRODUODENOSCOPY N/A 2020    Procedure: EGD (ESOPHAGOGASTRODUODENOSCOPY);  Surgeon: Jaya Villaseñor MD;  Location[facility];  Service: Endoscopy;  Laterality: N/A;    EXTERNAL EAR SURGERY      for cauliflower ear ,    GALLBLADDER SURGERY      INJECTION OF ANESTHETIC AGENT AROUND MEDIAL BRANCH NERVES INNERVATING CERVICAL FACET JOINT Left 2021    Procedure: BLOCK, NERVE, FACET JOINT, CERVICAL, MEDIAL BRANCH;  Surgeon: Ruddy Smith Jr., MD;  Location: [facility];  Service: Pain  Management;  Laterality: Left;  C3, 4, 5, 6    LAPAROSCOPIC CHOLECYSTECTOMY N/A 10/14/2020    Procedure: CHOLECYSTECTOMY, LAPAROSCOPIC;  Surgeon: Tad Ramirez MD;  Location: [facility] OR;  Service: General;  Laterality: N/A;    Reconstructive ankle surgery Right     TONSILLECTOMY, ADENOIDECTOMY      UPPER GASTROINTESTINAL ENDOSCOPY  2011    Dr. Tyson: normal findings per patient report       Fhx:  Family History   Problem Relation Name Age of Onset    Migraines Mother      Allergies Mother      Allergic rhinitis Mother      Heart disease Father      Hyperlipidemia Father      Migraines Father      Cancer Father      Allergies Father      Allergic rhinitis Father      Migraines Sister      Heart disease Maternal Grandmother      Allergies Maternal Grandmother      Allergic rhinitis Maternal Grandmother      Heart disease Maternal Grandfather      Allergies Maternal Grandfather      Allergic rhinitis Maternal Grandfather      Allergies Paternal Grandmother      Allergic rhinitis Paternal Grandmother      Allergies Paternal Grandfather      Allergic rhinitis Paternal Grandfather      Colon polyps Other great maternal uncle     Angioedema Neg Hx      Asthma Neg Hx      Atopy Neg Hx      Eczema Neg Hx      Immunodeficiency Neg Hx      Rhinitis Neg Hx      Urticaria Neg Hx      Colon cancer Neg Hx      Crohn's disease Neg Hx      Stomach cancer Neg Hx      Ulcerative colitis Neg Hx      Esophageal cancer Neg Hx         Shx: [employer], has 3 boys    Social History     Socioeconomic History    Marital status:    Tobacco Use    Smoking status: Never     Passive exposure: Never    Smokeless tobacco: Never   Substance and Sexual Activity    Alcohol use: Not Currently     Comment: occasional- maybe 1-2 beers a month    Drug use: No    Sexual activity: Yes     Partners: Male   Social History Narrative    Employed    engaged- wedding in 11/2018; [employer] med tech; 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  "    Social Drivers of Health     Financial Resource Strain: Low Risk  (6/20/2024)    Overall Financial Resource Strain (CARDIA)     Difficulty of Paying Living Expenses: Not hard at all   Food Insecurity: No Food Insecurity (6/20/2024)    Hunger Vital Sign     Worried About Running Out of Food in the Last Year: Never true     Ran Out of Food in the Last Year: Never true   Physical Activity: Insufficiently Active (6/20/2024)    Exercise Vital Sign     Days of Exercise per Week: 3 days     Minutes of Exercise per Session: 30 min   Stress: No Stress Concern Present (6/20/2024)    [employer] [place] [employer] - Occupational Stress Questionnaire     Feeling of Stress : Not at all   Housing Stability: Unknown (6/20/2024)    Housing Stability Vital Sign     Unable to Pay for Housing in the Last Year: No           Labs:   Reviewed in chart     Imaging:   Reviewed in chart        Other testing:  Reviewed in chart     Note: I have independently reviewed any/all imaging/labs/tests and agree with the report (s) as documented.  Any discrepancies will be as noted/demarcated by free text.  ERICA DIGGS 11/26/2024                     ROS:   Review Of Systems (questions asked, positive or additions in BOLD)  Gen: Weight change, fatigue/malaise, pyrexia   HEENT: Tinnitus, headache,  blurred vision, eye pain, diplopia, photophobia,  nose bleeds, congestion, sore throat, jaw pain, scalp pain, neck stiffness   Card: Palpitations, CP   Pulm: SOB, cough   Vas: Easy bruising, easy bleeding   GI: N/V/D/C, incontinence, hematemesis, hematochezia    : incontinence, hematuria   Endocrine: Temp intolerance, polyuria, polydipsia   M/S: Neck pain, myalgia, back pain, joint pain, falls    Neuro: PER HPI   PSY: Memory loss, confusion, depression, anxiety, trouble in sleep           EXAM:   /75 (BP Location: Left arm, Patient Position: Sitting)   Pulse 77   Temp 97.8 °F (36.6 °C) (Temporal)   Resp 17   Ht 5' 7" (1.702 m)   Wt " 73.6 kg (162 lb 4.1 oz)   LMP 11/24/2024   BMI 25.41 kg/m²    GEN:  NAD  HEENT: NC/AT   EXTREM:   no edema present.    NEURO:  Mental Status:  Awake, alert and appropriately oriented to time, place, and person.  Normal attention and concentration.  Speech is fluent and appropriate language function (I.e., comprehension).     Cranial Nerves:     Extraocular movements are intact and without nystagmus.  Visual fields are full to confrontation testing.   Facial movement is symmetric.    Hearing is normal. Uvula in midline. FROM of neck in all (6) directions, shoulder shrug symmetrical. Tongue in midline without fasiculation.     Motor: antigravity in all limbs   Tremor/pronator drift not apparent.         DTR's:                                            R              L                  Knee jerk 2+ 2+        Gait and Stance: Normal manner of stance and gait function testing.         This document has been electronically signed by Mr. Marco Antonio Benito MPA, PA-C on 11/26/2024, I have personally typed this message using the EMR.       Dr Barrie MD  was available during today's visit.     Personal Protective Equipment:    Personal Protective Equipment was used during this encounter including:   non latex gloves.          CC: Milly Cortez NP

## 2025-03-10 ENCOUNTER — LAB VISIT (OUTPATIENT)
Dept: LAB | Facility: HOSPITAL | Age: 35
End: 2025-03-10
Attending: NURSE PRACTITIONER
Payer: COMMERCIAL

## 2025-03-10 DIAGNOSIS — R63.5 ABNORMAL WEIGHT GAIN: ICD-10-CM

## 2025-03-10 DIAGNOSIS — Z13.228 SCREENING FOR METABOLIC DISORDER: ICD-10-CM

## 2025-03-10 DIAGNOSIS — E88.819 INSULIN RESISTANCE: ICD-10-CM

## 2025-03-10 DIAGNOSIS — Z13.1 SCREENING FOR DIABETES MELLITUS: Primary | ICD-10-CM

## 2025-03-10 DIAGNOSIS — Z13.220 SCREENING FOR LIPOID DISORDERS: ICD-10-CM

## 2025-03-10 DIAGNOSIS — E11.9 DIABETES MELLITUS, TYPE II: ICD-10-CM

## 2025-03-10 DIAGNOSIS — R53.83 FATIGUE: ICD-10-CM

## 2025-03-10 LAB
BASOPHILS # BLD AUTO: 0.03 K/UL (ref 0–0.2)
BASOPHILS NFR BLD: 0.4 % (ref 0–1.9)
DIFFERENTIAL METHOD BLD: NORMAL
EOSINOPHIL # BLD AUTO: 0.1 K/UL (ref 0–0.5)
EOSINOPHIL NFR BLD: 0.8 % (ref 0–8)
ERYTHROCYTE [DISTWIDTH] IN BLOOD BY AUTOMATED COUNT: 14 % (ref 11.5–14.5)
HCT VFR BLD AUTO: 45 % (ref 37–48.5)
HGB BLD-MCNC: 14.4 G/DL (ref 12–16)
IMM GRANULOCYTES # BLD AUTO: 0.02 K/UL (ref 0–0.04)
IMM GRANULOCYTES NFR BLD AUTO: 0.3 % (ref 0–0.5)
INSULIN COLLECTION INTERVAL: NORMAL
INSULIN SERPL-ACNC: 7.1 UU/ML
LYMPHOCYTES # BLD AUTO: 2.7 K/UL (ref 1–4.8)
LYMPHOCYTES NFR BLD: 37.2 % (ref 18–48)
MCH RBC QN AUTO: 28.2 PG (ref 27–31)
MCHC RBC AUTO-ENTMCNC: 32 G/DL (ref 32–36)
MCV RBC AUTO: 88 FL (ref 82–98)
MONOCYTES # BLD AUTO: 0.4 K/UL (ref 0.3–1)
MONOCYTES NFR BLD: 5.7 % (ref 4–15)
NEUTROPHILS # BLD AUTO: 4.1 K/UL (ref 1.8–7.7)
NEUTROPHILS NFR BLD: 55.6 % (ref 38–73)
NRBC BLD-RTO: 0 /100 WBC
PLATELET # BLD AUTO: 282 K/UL (ref 150–450)
PMV BLD AUTO: 11.8 FL (ref 9.2–12.9)
RBC # BLD AUTO: 5.1 M/UL (ref 4–5.4)
WBC # BLD AUTO: 7.37 K/UL (ref 3.9–12.7)

## 2025-03-10 PROCEDURE — 82607 VITAMIN B-12: CPT | Performed by: NURSE PRACTITIONER

## 2025-03-10 PROCEDURE — 83525 ASSAY OF INSULIN: CPT | Performed by: NURSE PRACTITIONER

## 2025-03-10 PROCEDURE — 82306 VITAMIN D 25 HYDROXY: CPT | Performed by: NURSE PRACTITIONER

## 2025-03-10 PROCEDURE — 83036 HEMOGLOBIN GLYCOSYLATED A1C: CPT | Performed by: NURSE PRACTITIONER

## 2025-03-10 PROCEDURE — 84443 ASSAY THYROID STIM HORMONE: CPT | Performed by: NURSE PRACTITIONER

## 2025-03-10 PROCEDURE — 80061 LIPID PANEL: CPT | Performed by: NURSE PRACTITIONER

## 2025-03-10 PROCEDURE — 36415 COLL VENOUS BLD VENIPUNCTURE: CPT | Mod: PO | Performed by: NURSE PRACTITIONER

## 2025-03-10 PROCEDURE — 85025 COMPLETE CBC W/AUTO DIFF WBC: CPT | Performed by: NURSE PRACTITIONER

## 2025-03-10 PROCEDURE — 80053 COMPREHEN METABOLIC PANEL: CPT | Performed by: NURSE PRACTITIONER

## 2025-03-11 LAB
25(OH)D3+25(OH)D2 SERPL-MCNC: 10 NG/ML (ref 30–96)
ALBUMIN SERPL BCP-MCNC: 4.2 G/DL (ref 3.5–5.2)
ALP SERPL-CCNC: 58 U/L (ref 40–150)
ALT SERPL W/O P-5'-P-CCNC: 17 U/L (ref 10–44)
ANION GAP SERPL CALC-SCNC: 9 MMOL/L (ref 8–16)
AST SERPL-CCNC: 29 U/L (ref 10–40)
BILIRUB SERPL-MCNC: 0.5 MG/DL (ref 0.1–1)
BUN SERPL-MCNC: 8 MG/DL (ref 6–20)
CALCIUM SERPL-MCNC: 8.9 MG/DL (ref 8.7–10.5)
CHLORIDE SERPL-SCNC: 109 MMOL/L (ref 95–110)
CHOLEST SERPL-MCNC: 217 MG/DL (ref 120–199)
CHOLEST/HDLC SERPL: 5.3 {RATIO} (ref 2–5)
CO2 SERPL-SCNC: 22 MMOL/L (ref 23–29)
CREAT SERPL-MCNC: 0.7 MG/DL (ref 0.5–1.4)
EST. GFR  (NO RACE VARIABLE): >60 ML/MIN/1.73 M^2
ESTIMATED AVG GLUCOSE: 91 MG/DL (ref 68–131)
GLUCOSE SERPL-MCNC: 84 MG/DL (ref 70–110)
HBA1C MFR BLD: 4.8 % (ref 4–5.6)
HDLC SERPL-MCNC: 41 MG/DL (ref 40–75)
HDLC SERPL: 18.9 % (ref 20–50)
LDLC SERPL CALC-MCNC: 148.4 MG/DL (ref 63–159)
NONHDLC SERPL-MCNC: 176 MG/DL
POTASSIUM SERPL-SCNC: 4.4 MMOL/L (ref 3.5–5.1)
PROT SERPL-MCNC: 7.7 G/DL (ref 6–8.4)
SODIUM SERPL-SCNC: 140 MMOL/L (ref 136–145)
TRIGL SERPL-MCNC: 138 MG/DL (ref 30–150)
TSH SERPL DL<=0.005 MIU/L-ACNC: 0.69 UIU/ML (ref 0.4–4)
VIT B12 SERPL-MCNC: 381 PG/ML (ref 210–950)

## 2025-08-19 ENCOUNTER — PATIENT MESSAGE (OUTPATIENT)
Dept: ADMINISTRATIVE | Facility: HOSPITAL | Age: 35
End: 2025-08-19
Payer: COMMERCIAL

## (undated) DEVICE — TROCAR ENDOPATH XCEL 5X75MM

## (undated) DEVICE — BANDAGE ADHESIVE

## (undated) DEVICE — SOL IRR STRL WATER 500ML

## (undated) DEVICE — BLADE SURG CARBON STEEL SZ11

## (undated) DEVICE — NDL INSUF ULTRA VERESS 120MM

## (undated) DEVICE — SEE L#120831

## (undated) DEVICE — TUBING HEATED INSUFFLATOR

## (undated) DEVICE — NEPTUNE 4 PORT MANIFOLD

## (undated) DEVICE — DRAPE ABDOMINAL TIBURON 14X11

## (undated) DEVICE — SCISSOR 5MMX35CM DIRECT DRIVE

## (undated) DEVICE — TROCAR KII FIOS 5MM X 100MM

## (undated) DEVICE — TROCAR ENDOPATH XCEL 5MM 7.5CM

## (undated) DEVICE — SEE MEDLINE ITEM 152622

## (undated) DEVICE — APPLICATOR CHLORAPREP ORN 26ML

## (undated) DEVICE — KIT ANTIFOG

## (undated) DEVICE — GLOVE SURG BIOGEL LATEX SZ 7.5

## (undated) DEVICE — ELECTRODE REM PLYHSV RETURN 9

## (undated) DEVICE — SEE MEDLINE ITEM 157128

## (undated) DEVICE — Device

## (undated) DEVICE — TROCAR ENDOPATH XCEL 11MM 10CM

## (undated) DEVICE — SYR 10CC LUER LOCK

## (undated) DEVICE — SUT 0 VICRYL / UR6 (J603)

## (undated) DEVICE — BAG TISS RETRV MONARCH 10MM

## (undated) DEVICE — SUT MONOCYRL 4-0 PS2 UND

## (undated) DEVICE — APPLIER CLIP EPIX UNIV 5X34